# Patient Record
Sex: MALE | Race: WHITE | NOT HISPANIC OR LATINO | Employment: OTHER | URBAN - METROPOLITAN AREA
[De-identification: names, ages, dates, MRNs, and addresses within clinical notes are randomized per-mention and may not be internally consistent; named-entity substitution may affect disease eponyms.]

---

## 2017-01-11 ENCOUNTER — TELEPHONE (OUTPATIENT)
Dept: UROLOGY | Facility: CLINIC | Age: 82
End: 2017-01-11

## 2017-01-11 DIAGNOSIS — C61 PROSTATE CANCER: Primary | ICD-10-CM

## 2017-01-11 NOTE — TELEPHONE ENCOUNTER
----- Message from Lani Vega MA sent at 1/11/2017 10:35 AM CST -----  I scheduled lab at [facility] on 2-3-17 but the order needs to be extended and linked.  Please extend and i can link.  Thank you.

## 2017-02-03 ENCOUNTER — LAB VISIT (OUTPATIENT)
Dept: LAB | Facility: HOSPITAL | Age: 82
End: 2017-02-03
Attending: UROLOGY
Payer: MEDICARE

## 2017-02-03 DIAGNOSIS — C61 PROSTATE CANCER: ICD-10-CM

## 2017-02-03 LAB — COMPLEXED PSA SERPL-MCNC: 0.38 NG/ML

## 2017-02-03 PROCEDURE — 36415 COLL VENOUS BLD VENIPUNCTURE: CPT

## 2017-02-03 PROCEDURE — 84153 ASSAY OF PSA TOTAL: CPT

## 2017-02-10 ENCOUNTER — OFFICE VISIT (OUTPATIENT)
Dept: UROLOGY | Facility: CLINIC | Age: 82
End: 2017-02-10
Payer: MEDICARE

## 2017-02-10 VITALS
DIASTOLIC BLOOD PRESSURE: 75 MMHG | HEART RATE: 58 BPM | WEIGHT: 186 LBS | SYSTOLIC BLOOD PRESSURE: 181 MMHG | HEIGHT: 69 IN | BODY MASS INDEX: 27.55 KG/M2

## 2017-02-10 DIAGNOSIS — C61 PROSTATE CANCER: Primary | ICD-10-CM

## 2017-02-10 PROCEDURE — 99999 PR PBB SHADOW E&M-EST. PATIENT-LVL III: CPT | Mod: PBBFAC,,, | Performed by: UROLOGY

## 2017-02-10 PROCEDURE — 99213 OFFICE O/P EST LOW 20 MIN: CPT | Mod: S$GLB,,, | Performed by: UROLOGY

## 2017-02-10 PROCEDURE — 51798 US URINE CAPACITY MEASURE: CPT | Mod: S$GLB,,, | Performed by: UROLOGY

## 2017-02-10 PROCEDURE — 1160F RVW MEDS BY RX/DR IN RCRD: CPT | Mod: S$GLB,,, | Performed by: UROLOGY

## 2017-02-10 PROCEDURE — 1157F ADVNC CARE PLAN IN RCRD: CPT | Mod: S$GLB,,, | Performed by: UROLOGY

## 2017-02-10 PROCEDURE — 1159F MED LIST DOCD IN RCRD: CPT | Mod: S$GLB,,, | Performed by: UROLOGY

## 2017-02-10 PROCEDURE — 1126F AMNT PAIN NOTED NONE PRSNT: CPT | Mod: S$GLB,,, | Performed by: UROLOGY

## 2017-02-10 NOTE — MR AVS SNAPSHOT
[facility]  [address]  [facility] [address]  Phone: [phone]                  Aleksandr Schultz   2/10/2017 11:15 AM   Office Visit    Description:  Male : 1926   Provider:  Quirino Tran MD   Department:  [facility]           Reason for Visit     Prostate Cancer     Benign Prostatic Hypertrophy           Diagnoses this Visit        Comments    Prostate cancer    -  Primary            To Do List           Future Appointments        Provider Department Dept Phone    3/29/2017 10:30 AM Merritt Short MD [facility] - Ophthalmology [phone]    4/3/2017 9:30 AM LAB, [facility] [phone]    4/10/2017 1:30 PM Lynne Buckner MD [place] - Cardiology [phone]      Goals (5 Years of Data)     None      Follow-Up and Disposition     Return in about 1 year (around 2/10/2018).      [facility]     [facility] Line - /7 Assistance  Registered nurses in the [facility] provide clinical advisement, health education, appointment booking, and other advisory services.  Call for this free service at [phone].             Medications           Message regarding Medications     Verify the changes and/or additions to your medication regime listed below are the same as discussed with your clinician today.  If any of these changes or additions are incorrect, please notify your healthcare provider.             Verify that the below list of medications is an accurate representation of the medications you are currently taking.  If none reported, the list may be blank. If incorrect, please contact your healthcare provider. Carry this list with you in case of emergency.           Current Medications     albuterol (PROVENTIL) 2.5 mg /3 mL (0.083 %) nebulizer solution Take 2.5 mg by nebulization 4 (four) times daily as needed.     amlodipine (NORVASC) 10 MG tablet TAKE 1 TABLET ONE TIME  "DAILY    aspirin (ECOTRIN) 81 MG EC tablet Take 81 mg by mouth once daily.      atorvastatin (LIPITOR) 40 MG tablet Take 40 mg by mouth once daily.    CALCIUM CARBONATE/VITAMIN D3 (VITAMIN D-3 ORAL) Take 1,000 mg by mouth once daily.    cilostazol (PLETAL) 100 MG Tab Take 50 mg by mouth 2 (two) times daily.      cinnamon bark (CINNAMON) 500 mg capsule Take 500 mg by mouth once daily.    cyclobenzaprine (FLEXERIL) 10 MG tablet Take 10 mg by mouth 3 (three) times daily as needed.      fish oil-omega-3 fatty acids 300-1,000 mg capsule Take 2 g by mouth once daily.      FLAXSEED OIL ORAL Take 1 tablet by mouth once daily.     folic acid (FOLVITE) 400 MCG tablet Take 400 mcg by mouth once daily. Patient taking 800mg Daily    furosemide (LASIX) 40 MG tablet TAKE 1 TABLET ONE TIME DAILY    garlic 1,000 mg Cap Take 1 tablet by mouth 2 (two) times daily.     glimepiride (AMARYL) 4 MG tablet Take 4 mg by mouth daily with breakfast.     ipratropium (ATROVENT) 0.02 % nebulizer solution Take 500 mcg by nebulization 4 (four) times daily. PRN    labetalol (NORMODYNE) 200 MG tablet TAKE 2 TABLETS TWICE DAILY    lisinopril (PRINIVIL,ZESTRIL) 2.5 MG tablet TAKE 1 TABLET EVERY DAY    lisinopril (PRINIVIL,ZESTRIL) 5 MG tablet Take 1 tablet (5 mg total) by mouth once daily.    loratadine 10 mg Cap Take 1 tablet by mouth once daily.     mometasone (NASONEX) 50 mcg/actuation nasal spray 2 sprays by Nasal route daily as needed.     nitroGLYCERIN (NITROSTAT) 0.4 MG SL tablet Place 0.4 mg under the tongue every 5 (five) minutes as needed (PRN).     ranitidine (ZANTAC) 150 MG tablet Take 150 mg by mouth 2 (two) times daily.      tamsulosin (FLOMAX) 0.4 mg Cp24 Take 0.4 mg by mouth once daily.           Clinical Reference Information           Your Vitals Were     BP Pulse Height Weight BMI    181/75 58 5' 9" (1.753 m) 84.4 kg (186 lb) 27.47 kg/m2      Blood Pressure          Most Recent Value    BP  (!)  181/75      Allergies as of 2/10/2017  "    Iodinated Contrast Media - Iv Dye      Immunizations Administered on Date of Encounter - 2/10/2017     None      Orders Placed During Today's Visit      Normal Orders This Visit    Bladder scan     Future Labs/Procedures Expected by Expires    Prostate Specific Antigen, Diagnostic  2/10/2018 2/10/2018      Language Assistance Services     ATTENTION: Language assistance services are available, free of charge. Please call [phone].      ATENCIÓN: Si habla español, tiene a elder disposición servicios gratuitos de asistencia lingüística. Llame al [phone].     CHÚ Ý: N?u b?n nói Ti?ng Vi?t, có các d?ch v? h? tr? ngôn ng? mi?n phí dành cho b?n. G?i s? [phone].         [facility] complies with applicable Federal civil rights laws and does not discriminate on the basis of race, color, national origin, age, disability, or sex.

## 2017-02-10 NOTE — PROGRESS NOTES
Subjective:       Patient ID: Aleksandr Schultz is a 89 y.o. male.    Chief Complaint: prostate cancer    Benign Prostatic Hypertrophy        Mr. Schultz is an 89 yo male with a h/o [place] 3+3 prostate cancer.  He underwent XRT in 2005.  He is here for f/u.  He is continent.  ED is not bothersome.    PSA on 2/3/17 was 0.38.  He has urinary frequency and urgency.  He was started on ditropan XL. This did not help.  No dysuria.  Nocturia 1x/night. No hematuria.  Urine dipstick show +glucose.    Lab Results   Component Value Date    PSA 0.65 12/04/2012    PSA 0.59 11/22/2011    PSA 0.63 12/17/2010    PSA 0.82 11/30/2009    PSA 0.86 12/11/2008    PSA 0.9 07/01/2008    PSA 0.9 12/10/2007    PSA 1.7 05/03/2007    PSA 0.8 04/18/2007    PSA 1.4 11/13/2006    PSA 1.7 05/22/2006    PSA 1.8 11/15/2005    PSA 1.8 07/19/2005    PSA 2.1 04/15/2005    PSA 7.3 (H) 11/12/2004    PSADIAG 0.38 02/03/2017    PSADIAG 0.58 01/08/2016    PSADIAG 0.55 07/07/2015    PSADIAG 1.5 01/06/2015    PSADIAG 0.76 01/07/2014     Has a h/o HTN, MI, and CVA.  Stroke was 1991 and MI 1982.  He is s/p Carotid endarterectomy.  He is s/p PCI for his CAD.  Takes 81 mg aspirin.  No chest pain. Has never taken NTG for chest pain.       Review of Systems    All other systems reviewed and negative except pertinent positives noted in HPI.    Objective:      Physical Exam   Constitutional: He is oriented to person, place, and time. He appears well-developed and well-nourished. No distress.   HENT:   Head: Normocephalic and atraumatic.   Eyes: No scleral icterus.   Neck: No tracheal deviation present.   Pulmonary/Chest: Effort normal. No respiratory distress.   Neurological: He is alert and oriented to person, place, and time.   Psychiatric: He has a normal mood and affect. His behavior is normal. Judgment and thought content normal.       Assessment:       1. Prostate cancer        Plan:       -A post void residual bladder scan was performed immediately after voiding.  The patient's PVR was noted to be 18mL.  -PSA reviewed.  Recheck in 1 year.  -LUTs are not terribly bothersome to him.  He has tried anticholinergics in the past.  We'll hold on any intervention at this time.  -f/u in 1 year.  -I spent 15 minutes with the patient of which more than half was spent in direct consultation with the patient in regards to our treatment and plan.

## 2017-03-17 RX ORDER — AMLODIPINE BESYLATE 10 MG/1
TABLET ORAL
Qty: 90 TABLET | Refills: 3 | Status: SHIPPED | OUTPATIENT
Start: 2017-03-17 | End: 2017-12-10 | Stop reason: SDUPTHER

## 2017-03-17 RX ORDER — FUROSEMIDE 40 MG/1
TABLET ORAL
Qty: 90 TABLET | Refills: 3 | Status: SHIPPED | OUTPATIENT
Start: 2017-03-17 | End: 2017-11-29 | Stop reason: SDUPTHER

## 2017-03-29 ENCOUNTER — OFFICE VISIT (OUTPATIENT)
Dept: OPHTHALMOLOGY | Facility: CLINIC | Age: 82
End: 2017-03-29
Payer: MEDICARE

## 2017-03-29 DIAGNOSIS — H04.123 BILATERAL DRY EYES: ICD-10-CM

## 2017-03-29 DIAGNOSIS — Z98.41 STATUS POST CATARACT EXTRACTION AND INSERTION OF INTRAOCULAR LENS, RIGHT: ICD-10-CM

## 2017-03-29 DIAGNOSIS — H25.12 NUCLEAR SCLEROSIS, LEFT: Primary | ICD-10-CM

## 2017-03-29 DIAGNOSIS — Z96.1 STATUS POST CATARACT EXTRACTION AND INSERTION OF INTRAOCULAR LENS, RIGHT: ICD-10-CM

## 2017-03-29 PROCEDURE — 99999 PR PBB SHADOW E&M-EST. PATIENT-LVL II: CPT | Mod: PBBFAC,,, | Performed by: OPHTHALMOLOGY

## 2017-03-29 PROCEDURE — 92014 COMPRE OPH EXAM EST PT 1/>: CPT | Mod: S$GLB,,, | Performed by: OPHTHALMOLOGY

## 2017-03-29 NOTE — MR AVS SNAPSHOT
[facility]  [address]  [facility] [address]  Phone: [phone]  Fax: [phone]                  Aleksandr Schultz   3/29/2017 1:30 PM   Office Visit    Description:  Male : 1926   Provider:  Merritt Short MD   Department:  [facility]           Reason for Visit     Diabetic Eye Exam           Diagnoses this Visit        Comments    Nuclear sclerosis, left    -  Primary     Status post cataract extraction and insertion of intraocular lens, right         Bilateral dry eyes         No diabetic retinopathy in both eyes                To Do List           Future Appointments        Provider Department Dept Phone    4/3/2017 9:30 AM LAB, [facility] [phone]    4/10/2017 1:30 PM Lynne Buckner MD [place] - Cardiology [phone]      Goals (5 Years of Data)     None      Follow-Up and Disposition     Return in about 1 year (around 3/29/2018), or if symptoms worsen or fail to improve, for Pressure and Dilate.      [facility]     [facility] Care Line -  Assistance  Unless otherwise directed by your provider, please contact [facility], our nurse care line that is available for  assistance.     Registered nurses in the [facility] provide: appointment scheduling, clinical advisement, health education, and other advisory services.  Call: [phone] (toll free)               Medications           Message regarding Medications     Verify the changes and/or additions to your medication regime listed below are the same as discussed with your clinician today.  If any of these changes or additions are incorrect, please notify your healthcare provider.             Verify that the below list of medications is an accurate representation of the medications you are currently taking.  If none reported, the list may be blank. If incorrect, please contact your healthcare provider. Carry  this list with you in case of emergency.           Current Medications     albuterol (PROVENTIL) 2.5 mg /3 mL (0.083 %) nebulizer solution Take 2.5 mg by nebulization 4 (four) times daily as needed.     amlodipine (NORVASC) 10 MG tablet TAKE 1 TABLET ONE TIME DAILY    aspirin (ECOTRIN) 81 MG EC tablet Take 81 mg by mouth once daily.      atorvastatin (LIPITOR) 40 MG tablet Take 40 mg by mouth once daily.    CALCIUM CARBONATE/VITAMIN D3 (VITAMIN D-3 ORAL) Take 1,000 mg by mouth once daily.    cilostazol (PLETAL) 100 MG Tab Take 50 mg by mouth 2 (two) times daily.      cinnamon bark (CINNAMON) 500 mg capsule Take 500 mg by mouth once daily.    cyclobenzaprine (FLEXERIL) 10 MG tablet Take 10 mg by mouth 3 (three) times daily as needed.      fish oil-omega-3 fatty acids 300-1,000 mg capsule Take 2 g by mouth once daily.      FLAXSEED OIL ORAL Take 1 tablet by mouth once daily.     folic acid (FOLVITE) 400 MCG tablet Take 400 mcg by mouth once daily. Patient taking 800mg Daily    furosemide (LASIX) 40 MG tablet TAKE 1 TABLET ONE TIME DAILY    garlic 1,000 mg Cap Take 1 tablet by mouth 2 (two) times daily.     glimepiride (AMARYL) 4 MG tablet Take 4 mg by mouth daily with breakfast.     ipratropium (ATROVENT) 0.02 % nebulizer solution Take 500 mcg by nebulization 4 (four) times daily. PRN    labetalol (NORMODYNE) 200 MG tablet TAKE 2 TABLETS TWICE DAILY    lisinopril (PRINIVIL,ZESTRIL) 2.5 MG tablet TAKE 1 TABLET EVERY DAY    lisinopril (PRINIVIL,ZESTRIL) 5 MG tablet Take 1 tablet (5 mg total) by mouth once daily.    loratadine 10 mg Cap Take 1 tablet by mouth once daily.     mometasone (NASONEX) 50 mcg/actuation nasal spray 2 sprays by Nasal route daily as needed.     nitroGLYCERIN (NITROSTAT) 0.4 MG SL tablet Place 0.4 mg under the tongue every 5 (five) minutes as needed (PRN).     ranitidine (ZANTAC) 150 MG tablet Take 150 mg by mouth 2 (two) times daily.      tamsulosin (FLOMAX) 0.4 mg Cp24 Take 0.4 mg by mouth once  daily.           Clinical Reference Information           Allergies as of 3/29/2017     Iodinated Contrast Media - Iv Dye      Immunizations Administered on Date of Encounter - 3/29/2017     None      Language Assistance Services     ATTENTION: Language assistance services are available, free of charge. Please call [phone].      ATENCIÓN: Si paul kenney, tiene a elder disposición servicios gratuitos de asistencia lingüística. Llame al [phone].     CHÚ Ý: N?u b?n nói Ti?ng Vi?t, có các d?ch v? h? tr? ngôn ng? mi?n phí dành cho b?n. G?i s? [phone].         [facility] complies with applicable Federal civil rights laws and does not discriminate on the basis of race, color, national origin, age, disability, or sex.

## 2017-03-30 NOTE — PROGRESS NOTES
HPI     DLS: 03/14/2016  IOP/DFE  Type 2 diabetes  PC IOL OD  NSC OS        Last edited by Lizzie Flores on 3/29/2017  1:33 PM.         Assessment /Plan     For exam results, see Encounter Report.    Nuclear sclerosis, left    Status post cataract extraction and insertion of intraocular lens, right    Bilateral dry eyes - Both Eyes    No diabetic retinopathy in both eyes - Both Eyes          Patient with Daughter    Wife 87 yo  Bedridden x 8 years with AD  Caretaker  Daughter lives next door        NSC OS - CE PRN as discussed  + FLOMAX use with fair - poor dilation  Patient happy with Va QOL    PC IOL OD  stable    NIDDM  no BDR or csme  Control    Dry Eye Syndrome: discussed use of warm compresses, preserved & non-preserved artificial tears, gel and PM ointment options.  Also discussed options utilizing medications.         Plan  RTC 1 year for cataract and BDR check, IOP & DFE  RTC Sooner prn with good understanding

## 2017-04-03 ENCOUNTER — PATIENT MESSAGE (OUTPATIENT)
Dept: CARDIOLOGY | Facility: CLINIC | Age: 82
End: 2017-04-03

## 2017-04-03 ENCOUNTER — TELEPHONE (OUTPATIENT)
Dept: CARDIOLOGY | Facility: CLINIC | Age: 82
End: 2017-04-03

## 2017-04-03 DIAGNOSIS — I10 HYPERTENSION, ESSENTIAL: ICD-10-CM

## 2017-04-03 DIAGNOSIS — E78.5 DYSLIPIDEMIA: ICD-10-CM

## 2017-04-03 NOTE — TELEPHONE ENCOUNTER
Pt's daughter notified that lab orders are linked to pt's appointment. José Miguel verbalized understanding.

## 2017-04-05 ENCOUNTER — LAB VISIT (OUTPATIENT)
Dept: LAB | Facility: HOSPITAL | Age: 82
End: 2017-04-05
Attending: INTERNAL MEDICINE
Payer: MEDICARE

## 2017-04-05 ENCOUNTER — TELEPHONE (OUTPATIENT)
Dept: CARDIOLOGY | Facility: CLINIC | Age: 82
End: 2017-04-05

## 2017-04-05 DIAGNOSIS — I10 HYPERTENSION, ESSENTIAL: ICD-10-CM

## 2017-04-05 DIAGNOSIS — E78.5 DYSLIPIDEMIA: ICD-10-CM

## 2017-04-05 LAB
ALBUMIN SERPL BCP-MCNC: 3.5 G/DL
ALP SERPL-CCNC: 78 U/L
ALT SERPL W/O P-5'-P-CCNC: 25 U/L
ANION GAP SERPL CALC-SCNC: 9 MMOL/L
AST SERPL-CCNC: 20 U/L
BILIRUB SERPL-MCNC: 0.6 MG/DL
BUN SERPL-MCNC: 21 MG/DL
CALCIUM SERPL-MCNC: 9 MG/DL
CHLORIDE SERPL-SCNC: 107 MMOL/L
CHOLEST/HDLC SERPL: 3.3 {RATIO}
CO2 SERPL-SCNC: 28 MMOL/L
CREAT SERPL-MCNC: 1 MG/DL
EST. GFR  (AFRICAN AMERICAN): >60 ML/MIN/1.73 M^2
EST. GFR  (NON AFRICAN AMERICAN): >60 ML/MIN/1.73 M^2
GLUCOSE SERPL-MCNC: 99 MG/DL
HDL/CHOLESTEROL RATIO: 29.9 %
HDLC SERPL-MCNC: 144 MG/DL
HDLC SERPL-MCNC: 43 MG/DL
LDLC SERPL CALC-MCNC: 87.8 MG/DL
NONHDLC SERPL-MCNC: 101 MG/DL
POTASSIUM SERPL-SCNC: 3.6 MMOL/L
PROT SERPL-MCNC: 6.7 G/DL
SODIUM SERPL-SCNC: 144 MMOL/L
TRIGL SERPL-MCNC: 66 MG/DL
TSH SERPL DL<=0.005 MIU/L-ACNC: 2.94 UIU/ML

## 2017-04-05 PROCEDURE — 36415 COLL VENOUS BLD VENIPUNCTURE: CPT

## 2017-04-05 PROCEDURE — 84443 ASSAY THYROID STIM HORMONE: CPT

## 2017-04-05 PROCEDURE — 83036 HEMOGLOBIN GLYCOSYLATED A1C: CPT

## 2017-04-05 PROCEDURE — 80053 COMPREHEN METABOLIC PANEL: CPT

## 2017-04-05 PROCEDURE — 80061 LIPID PANEL: CPT

## 2017-04-05 NOTE — TELEPHONE ENCOUNTER
----- Message from Lynne Buckner MD sent at 4/5/2017 10:42 AM CDT -----  Please review.  We will discuss the results during your upcoming visit with me. It looks good.

## 2017-04-06 LAB
ESTIMATED AVG GLUCOSE: 154 MG/DL
HBA1C MFR BLD HPLC: 7 %

## 2017-04-10 ENCOUNTER — OFFICE VISIT (OUTPATIENT)
Dept: CARDIOLOGY | Facility: CLINIC | Age: 82
End: 2017-04-10
Payer: MEDICARE

## 2017-04-10 VITALS
BODY MASS INDEX: 27.13 KG/M2 | HEIGHT: 69 IN | HEART RATE: 59 BPM | SYSTOLIC BLOOD PRESSURE: 177 MMHG | DIASTOLIC BLOOD PRESSURE: 77 MMHG | WEIGHT: 183.19 LBS

## 2017-04-10 DIAGNOSIS — I77.9 BILATERAL CAROTID ARTERY DISEASE: Chronic | ICD-10-CM

## 2017-04-10 DIAGNOSIS — I71.40 ABDOMINAL AORTIC ANEURYSM (AAA) WITHOUT RUPTURE: Chronic | ICD-10-CM

## 2017-04-10 DIAGNOSIS — I67.2 CEREBRAL ATHEROSCLEROSIS: ICD-10-CM

## 2017-04-10 DIAGNOSIS — E78.5 DYSLIPIDEMIA: Chronic | ICD-10-CM

## 2017-04-10 DIAGNOSIS — I10 ESSENTIAL HYPERTENSION: Primary | ICD-10-CM

## 2017-04-10 DIAGNOSIS — I35.9 AORTIC VALVE DISORDER: Chronic | ICD-10-CM

## 2017-04-10 DIAGNOSIS — I73.9 PAD (PERIPHERAL ARTERY DISEASE): ICD-10-CM

## 2017-04-10 DIAGNOSIS — Z85.46 HISTORY OF PROSTATE CANCER: ICD-10-CM

## 2017-04-10 DIAGNOSIS — N25.81 SECONDARY HYPERPARATHYROIDISM: ICD-10-CM

## 2017-04-10 PROCEDURE — 1125F AMNT PAIN NOTED PAIN PRSNT: CPT | Mod: S$GLB,,, | Performed by: INTERNAL MEDICINE

## 2017-04-10 PROCEDURE — 1160F RVW MEDS BY RX/DR IN RCRD: CPT | Mod: S$GLB,,, | Performed by: INTERNAL MEDICINE

## 2017-04-10 PROCEDURE — 1159F MED LIST DOCD IN RCRD: CPT | Mod: S$GLB,,, | Performed by: INTERNAL MEDICINE

## 2017-04-10 PROCEDURE — 99999 PR PBB SHADOW E&M-EST. PATIENT-LVL III: CPT | Mod: PBBFAC,,, | Performed by: INTERNAL MEDICINE

## 2017-04-10 PROCEDURE — 1157F ADVNC CARE PLAN IN RCRD: CPT | Mod: S$GLB,,, | Performed by: INTERNAL MEDICINE

## 2017-04-10 PROCEDURE — 99214 OFFICE O/P EST MOD 30 MIN: CPT | Mod: S$GLB,,, | Performed by: INTERNAL MEDICINE

## 2017-04-10 RX ORDER — FOLIC ACID 1 MG/1
1 TABLET ORAL DAILY
COMMUNITY
End: 2019-02-25

## 2017-04-10 RX ORDER — METFORMIN HYDROCHLORIDE 500 MG/1
500 TABLET ORAL
COMMUNITY
End: 2018-08-14 | Stop reason: ALTCHOICE

## 2017-04-10 RX ORDER — CHOLECALCIFEROL (VITAMIN D3) 25 MCG
1000 TABLET ORAL DAILY
COMMUNITY

## 2017-04-10 NOTE — PROGRESS NOTES
Subjective:   Patient ID:  Aleksandr Schultz is a 89 y.o. male who presents for follow-up of Hypertension      HPI: Patient is here for follow-up of elevated blood pressure. He tries to adhere to law salt diet. Patient denies chest pain, dyspnea, and palpitations.  Peiperal edema is controlled on diuretics and support stockings.  BP is elevated today.        Lab Results   Component Value Date     04/05/2017    K 3.6 04/05/2017     04/05/2017    CO2 28 04/05/2017    BUN 21 04/05/2017    CREATININE 1.0 04/05/2017    GLU 99 04/05/2017    HGBA1C 7.0 (H) 04/05/2017    MG 2.4 07/07/2011    AST 20 04/05/2017    ALT 25 04/05/2017    ALBUMIN 3.5 04/05/2017    PROT 6.7 04/05/2017    BILITOT 0.6 04/05/2017    WBC 4.42 03/13/2014    HGB 13.3 (L) 03/13/2014    HCT 39.8 (L) 03/13/2014    MCV 94 03/13/2014     03/13/2014    PSA 0.65 12/04/2012    TSH 2.942 04/05/2017    CHOL 144 04/05/2017    HDL 43 04/05/2017    LDLCALC 87.8 04/05/2017    TRIG 66 04/05/2017       Review of Systems   Constitution: Positive for malaise/fatigue.   HENT: Negative.    Eyes: Negative.    Cardiovascular: Positive for dyspnea on exertion and leg swelling. Negative for chest pain, claudication, irregular heartbeat, near-syncope, palpitations and syncope.   Respiratory: Positive for cough and wheezing. Negative for shortness of breath and snoring.    Endocrine: Negative.  Negative for cold intolerance, heat intolerance, polydipsia, polyphagia and polyuria.   Skin: Negative.    Musculoskeletal: Positive for arthritis and back pain.   Gastrointestinal: Negative.    Genitourinary: Negative.    Neurological: Positive for loss of balance.   Psychiatric/Behavioral: Negative.        Current Outpatient Prescriptions:     albuterol (PROVENTIL) 2.5 mg /3 mL (0.083 %) nebulizer solution, Take 2.5 mg by nebulization 4 (four) times daily as needed. , Disp: , Rfl:     amlodipine (NORVASC) 10 MG tablet, TAKE 1 TABLET ONE TIME DAILY, Disp: 90 tablet,  Rfl: 3    aspirin (ECOTRIN) 81 MG EC tablet, Take 81 mg by mouth once daily.  , Disp: , Rfl:     atorvastatin (LIPITOR) 40 MG tablet, Take 40 mg by mouth once daily., Disp: , Rfl:     cilostazol (PLETAL) 100 MG Tab, Take 100 mg by mouth once daily. , Disp: , Rfl:     cinnamon bark (CINNAMON) 500 mg capsule, Take 500 mg by mouth once daily., Disp: , Rfl:     cyclobenzaprine (FLEXERIL) 10 MG tablet, Take 10 mg by mouth 3 (three) times daily as needed.  , Disp: , Rfl:     fish oil-omega-3 fatty acids 300-1,000 mg capsule, Take 2 g by mouth once daily.  , Disp: , Rfl:     FLAXSEED OIL ORAL, Take 1 tablet by mouth once daily. , Disp: , Rfl:     folic acid (FOLVITE) 1 MG tablet, Take 1 mg by mouth once daily., Disp: , Rfl:     furosemide (LASIX) 40 MG tablet, TAKE 1 TABLET ONE TIME DAILY, Disp: 90 tablet, Rfl: 3    garlic 1,000 mg Cap, Take 1 tablet by mouth 2 (two) times daily. , Disp: , Rfl:     glimepiride (AMARYL) 4 MG tablet, Take 4 mg by mouth daily with breakfast. Pt taking one pill twice a day., Disp: , Rfl:     ipratropium (ATROVENT) 0.02 % nebulizer solution, Take 500 mcg by nebulization 4 (four) times daily. PRN, Disp: , Rfl:     labetalol (NORMODYNE) 200 MG tablet, TAKE 2 TABLETS TWICE DAILY, Disp: 360 tablet, Rfl: 3    lisinopril (PRINIVIL,ZESTRIL) 2.5 MG tablet, TAKE 1 TABLET EVERY DAY, Disp: 90 tablet, Rfl: 3    loratadine 10 mg Cap, Take 1 tablet by mouth once daily. , Disp: , Rfl:     metformin (GLUCOPHAGE) 500 MG tablet, Take 500 mg by mouth daily with breakfast., Disp: , Rfl:     mometasone (NASONEX) 50 mcg/actuation nasal spray, 2 sprays by Nasal route daily as needed. , Disp: , Rfl:     nitroGLYCERIN (NITROSTAT) 0.4 MG SL tablet, Place 0.4 mg under the tongue every 5 (five) minutes as needed (PRN). , Disp: , Rfl:     ranitidine (ZANTAC) 150 MG tablet, Take 150 mg by mouth 2 (two) times daily.  , Disp: , Rfl:     tamsulosin (FLOMAX) 0.4 mg Cp24, Take 0.4 mg by mouth once daily.,  "Disp: , Rfl:     vitamin D 1000 units Tab, Take 1,000 Units by mouth once daily., Disp: , Rfl:   Objective:   Physical Exam   Constitutional: He is oriented to person, place, and time. He appears well-developed and well-nourished.   BP (!) 177/77  Pulse (!) 59  Ht 5' 9" (1.753 m)  Wt 83.1 kg (183 lb 3.2 oz)  BMI 27.05 kg/m2     HENT:   Head: Normocephalic.   Eyes: Pupils are equal, round, and reactive to light.   Neck: Normal range of motion. Neck supple. No thyromegaly present.   Cardiovascular: Normal rate, regular rhythm and intact distal pulses.  Exam reveals no gallop and no friction rub.    Murmur heard.   Midsystolic murmur is present with a grade of 2/6  at the upper right sternal border radiating to the neck  Pulses:       Carotid pulses are 2+ on the right side with bruit, and 2+ on the left side with bruit.       Radial pulses are 2+ on the right side, and 2+ on the left side.        Femoral pulses are 2+ on the right side, and 2+ on the left side.       Popliteal pulses are 2+ on the right side, and 2+ on the left side.        Dorsalis pedis pulses are 2+ on the right side, and 2+ on the left side.        Posterior tibial pulses are 2+ on the right side, and 2+ on the left side.   Pulmonary/Chest: Effort normal and breath sounds normal. No respiratory distress. He has no wheezes. He has no rales. He exhibits no tenderness.   Abdominal: Soft. Bowel sounds are normal.   Musculoskeletal: Normal range of motion. He exhibits edema.   1+ bilateral edema   Neurological: He is alert and oriented to person, place, and time.   Skin: Skin is warm and dry.   Psychiatric: He has a normal mood and affect.   Nursing note and vitals reviewed.      Assessment and Plan:     Problem List Items Addressed This Visit        Cardiology Problems    AAA (abdominal aortic aneurysm) (Chronic)    Bilateral carotid artery disease (Chronic)    Relevant Orders    2D echo with color flow doppler    CAR Ultrasound doppler carotid " bliateral    Basic metabolic panel    Aortic valve disorder (Chronic)    Relevant Orders    2D echo with color flow doppler    CAR Ultrasound doppler carotid bliateral    Basic metabolic panel    Hypertension - Primary    Relevant Orders    2D echo with color flow doppler    CAR Ultrasound doppler carotid bliateral    Basic metabolic panel    PAD (peripheral artery disease)       Other    Dyslipidemia (Chronic)    Type 2 diabetes, uncontrolled, with neuropathy (Chronic)    Secondary hyperparathyroidism    History of prostate cancer      Other Visit Diagnoses     Cerebral atherosclerosis         Relevant Orders    CAR Ultrasound doppler carotid bliateral      Advised to double on Lisinopril. Patient is not sure what is his present dose. Will call back   Review tests results and advise.  Keep BP log and call if out of range.  Return in about 6 months (around 10/10/2017).

## 2017-04-10 NOTE — MR AVS SNAPSHOT
[place] [facility]   [facility]  [place] [address]  Phone: [phone]                  Aleksandr Schultz   4/10/2017 1:30 PM   Office Visit    Description:  Male : 1926   Provider:  Lynne Buckner MD   Department:  [place] [facility]           Reason for Visit     Hypertension                To Do List           Goals (5 Years of Data)     None      [facility]     [facility] Nurse Care Line -  Assistance  Unless otherwise directed by your provider, please contact [facility], our nurse care line that is available for  assistance.     Registered nurses in the [facility] provide: appointment scheduling, clinical advisement, health education, and other advisory services.  Call: [phone] (toll free)               Medications           Message regarding Medications     Verify the changes and/or additions to your medication regime listed below are the same as discussed with your clinician today.  If any of these changes or additions are incorrect, please notify your healthcare provider.        STOP taking these medications     CALCIUM CARBONATE/VITAMIN D3 (VITAMIN D-3 ORAL) Take 1,000 mg by mouth once daily.           Verify that the below list of medications is an accurate representation of the medications you are currently taking.  If none reported, the list may be blank. If incorrect, please contact your healthcare provider. Carry this list with you in case of emergency.           Current Medications     albuterol (PROVENTIL) 2.5 mg /3 mL (0.083 %) nebulizer solution Take 2.5 mg by nebulization 4 (four) times daily as needed.     amlodipine (NORVASC) 10 MG tablet TAKE 1 TABLET ONE TIME DAILY    aspirin (ECOTRIN) 81 MG EC tablet Take 81 mg by mouth once daily.      atorvastatin (LIPITOR) 40 MG tablet Take 40 mg by mouth once daily.    cilostazol (PLETAL) 100 MG Tab Take 100 mg by mouth once daily.     cinnamon bark (CINNAMON) 500 mg  "capsule Take 500 mg by mouth once daily.    cyclobenzaprine (FLEXERIL) 10 MG tablet Take 10 mg by mouth 3 (three) times daily as needed.      fish oil-omega-3 fatty acids 300-1,000 mg capsule Take 2 g by mouth once daily.      FLAXSEED OIL ORAL Take 1 tablet by mouth once daily.     folic acid (FOLVITE) 1 MG tablet Take 1 mg by mouth once daily.    furosemide (LASIX) 40 MG tablet TAKE 1 TABLET ONE TIME DAILY    garlic 1,000 mg Cap Take 1 tablet by mouth 2 (two) times daily.     glimepiride (AMARYL) 4 MG tablet Take 4 mg by mouth daily with breakfast. Pt taking one pill twice a day.    ipratropium (ATROVENT) 0.02 % nebulizer solution Take 500 mcg by nebulization 4 (four) times daily. PRN    labetalol (NORMODYNE) 200 MG tablet TAKE 2 TABLETS TWICE DAILY    lisinopril (PRINIVIL,ZESTRIL) 2.5 MG tablet TAKE 1 TABLET EVERY DAY    loratadine 10 mg Cap Take 1 tablet by mouth once daily.     metformin (GLUCOPHAGE) 500 MG tablet Take 500 mg by mouth daily with breakfast.    mometasone (NASONEX) 50 mcg/actuation nasal spray 2 sprays by Nasal route daily as needed.     nitroGLYCERIN (NITROSTAT) 0.4 MG SL tablet Place 0.4 mg under the tongue every 5 (five) minutes as needed (PRN).     ranitidine (ZANTAC) 150 MG tablet Take 150 mg by mouth 2 (two) times daily.      tamsulosin (FLOMAX) 0.4 mg Cp24 Take 0.4 mg by mouth once daily.    vitamin D 1000 units Tab Take 1,000 Units by mouth once daily.           Clinical Reference Information           Your Vitals Were     BP Pulse Height Weight BMI    177/77 59 5' 9" (1.753 m) 83.1 kg (183 lb 3.2 oz) 27.05 kg/m2      Blood Pressure          Most Recent Value    Right Arm BP - Sitting  167/70    Left Arm BP - Sitting  177/77    BP  (!)  177/77      Allergies as of 4/10/2017     Iodinated Contrast Media - Iv Dye      Immunizations Administered on Date of Encounter - 4/10/2017     None      Language Assistance Services     ATTENTION: Language assistance services are available, free of " charge. Please call [phone].      ATENCIÓN: Si habla español, tiene a elder disposición servicios gratuitos de asistencia lingüística. Llame al [phone].     CHÚ Ý: N?u b?n nói Ti?ng Vi?t, có các d?ch v? h? tr? ngôn ng? mi?n phí dành cho b?n. G?i s? [phone].         [facility] [facility] complies with applicable Federal civil rights laws and does not discriminate on the basis of race, color, national origin, age, disability, or sex.

## 2017-04-11 ENCOUNTER — TELEPHONE (OUTPATIENT)
Dept: CARDIOLOGY | Facility: CLINIC | Age: 82
End: 2017-04-11

## 2017-04-11 DIAGNOSIS — I10 HYPERTENSION, ESSENTIAL: Primary | ICD-10-CM

## 2017-04-11 RX ORDER — LISINOPRIL 10 MG/1
10 TABLET ORAL DAILY
Qty: 90 TABLET | Refills: 3 | Status: SHIPPED | OUTPATIENT
Start: 2017-04-11 | End: 2017-06-16 | Stop reason: DRUGHIGH

## 2017-04-11 RX ORDER — LISINOPRIL 10 MG/1
10 TABLET ORAL DAILY
COMMUNITY
End: 2017-04-11 | Stop reason: SDUPTHER

## 2017-04-11 NOTE — TELEPHONE ENCOUNTER
Pt's daughter stated that pt is taking 2, 2.5mg Lisinopril and has a bottle of 5mg Lisinopril at home. Pt had a lot of 2.5mg that he has not started on the 5mg bottle yet. Pt is taking a total of 5mg once a day.

## 2017-04-11 NOTE — TELEPHONE ENCOUNTER
----- Message from Angeles Joshi MA sent at 4/10/2017  2:05 PM CDT -----  Did pt's daughter call w/dosage of lisinopril? Whatever pt is taking double the dosage per Dr. Buckner.

## 2017-04-20 ENCOUNTER — CLINICAL SUPPORT (OUTPATIENT)
Dept: CARDIOLOGY | Facility: CLINIC | Age: 82
End: 2017-04-20
Payer: MEDICARE

## 2017-04-20 ENCOUNTER — LAB VISIT (OUTPATIENT)
Dept: LAB | Facility: HOSPITAL | Age: 82
End: 2017-04-20
Attending: INTERNAL MEDICINE
Payer: MEDICARE

## 2017-04-20 DIAGNOSIS — I77.9 BILATERAL CAROTID ARTERY DISEASE: Chronic | ICD-10-CM

## 2017-04-20 DIAGNOSIS — I67.2 CEREBRAL ATHEROSCLEROSIS: ICD-10-CM

## 2017-04-20 DIAGNOSIS — I35.9 AORTIC VALVE DISORDER: Chronic | ICD-10-CM

## 2017-04-20 DIAGNOSIS — I10 ESSENTIAL HYPERTENSION: ICD-10-CM

## 2017-04-20 LAB
ANION GAP SERPL CALC-SCNC: 8 MMOL/L
BUN SERPL-MCNC: 17 MG/DL
CALCIUM SERPL-MCNC: 8.6 MG/DL
CHLORIDE SERPL-SCNC: 105 MMOL/L
CO2 SERPL-SCNC: 28 MMOL/L
CREAT SERPL-MCNC: 1.1 MG/DL
EST. GFR  (AFRICAN AMERICAN): >60 ML/MIN/1.73 M^2
EST. GFR  (NON AFRICAN AMERICAN): 58.8 ML/MIN/1.73 M^2
GLUCOSE SERPL-MCNC: 146 MG/DL
INTERNAL CAROTID STENOSIS: ABNORMAL
POTASSIUM SERPL-SCNC: 3.2 MMOL/L
SODIUM SERPL-SCNC: 141 MMOL/L

## 2017-04-20 PROCEDURE — 93880 EXTRACRANIAL BILAT STUDY: CPT | Mod: S$GLB,,, | Performed by: INTERNAL MEDICINE

## 2017-04-20 PROCEDURE — 93306 TTE W/DOPPLER COMPLETE: CPT | Mod: S$GLB,,, | Performed by: INTERNAL MEDICINE

## 2017-04-21 ENCOUNTER — TELEPHONE (OUTPATIENT)
Dept: CARDIOLOGY | Facility: CLINIC | Age: 82
End: 2017-04-21

## 2017-04-21 LAB
AORTIC VALVE REGURGITATION: ABNORMAL
AORTIC VALVE STENOSIS: ABNORMAL
DIASTOLIC DYSFUNCTION: YES
ESTIMATED PA SYSTOLIC PRESSURE: 35.95
MITRAL VALVE MOBILITY: NORMAL
RETIRED EF AND QEF - SEE NOTES: 60 (ref 55–65)
TRICUSPID VALVE REGURGITATION: ABNORMAL

## 2017-04-21 NOTE — TELEPHONE ENCOUNTER
----- Message from Lynne Buckner MD sent at 4/21/2017  1:44 PM CDT -----  Please review.  We will discuss the results during your upcoming visit with me. 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 No changes since the last one

## 2017-04-26 ENCOUNTER — TELEPHONE (OUTPATIENT)
Dept: CARDIOLOGY | Facility: CLINIC | Age: 82
End: 2017-04-26

## 2017-04-26 DIAGNOSIS — E87.6 HYPOKALEMIA: Primary | ICD-10-CM

## 2017-04-26 RX ORDER — POTASSIUM CHLORIDE 750 MG/1
10 CAPSULE, EXTENDED RELEASE ORAL DAILY
Qty: 30 CAPSULE | Refills: 1 | Status: SHIPPED | OUTPATIENT
Start: 2017-04-26 | End: 2017-04-28 | Stop reason: SDUPTHER

## 2017-04-26 RX ORDER — POTASSIUM CHLORIDE 750 MG/1
10 CAPSULE, EXTENDED RELEASE ORAL DAILY
COMMUNITY
End: 2017-04-26 | Stop reason: SDUPTHER

## 2017-04-26 NOTE — TELEPHONE ENCOUNTER
----- Message from Lynne Buckner MD sent at 4/25/2017  6:07 PM CDT -----  Micro K 10Meq daily.  Check BMP in 2 weeks.  ----- Message -----     From: Angeles Joshi MA     Sent: 4/21/2017   1:56 PM       To: Lynne Buckner MD    Pt was last seen in office by you on 4/10/17 and due to come back in 10/2017. Do you want to prescribe potassium pills now and do you want to add anything about his lab results? Please advise.   Thanks,   Angeles Joshi  ----- Message -----     From: Lynne Buckner MD     Sent: 4/21/2017   1:49 PM       To: Dudley Steve    Please review.  We will discuss the results during your upcoming visit with me.Potassim i slow.  Please increase OJ and banana.  We may have to prescribe potassium pills for you when you come for a visit

## 2017-04-26 NOTE — TELEPHONE ENCOUNTER
----- Message from Lynne Buckner MD sent at 4/25/2017  6:10 PM CDT -----  Mr. Schultz, please review results of your heart echo,. 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 Overall heart function is good.  For now  No other studies are indicated.

## 2017-04-28 DIAGNOSIS — E87.6 HYPOKALEMIA: ICD-10-CM

## 2017-04-28 RX ORDER — POTASSIUM CHLORIDE 750 MG/1
10 CAPSULE, EXTENDED RELEASE ORAL DAILY
Qty: 90 CAPSULE | Refills: 3 | Status: SHIPPED | OUTPATIENT
Start: 2017-04-28 | End: 2017-11-29 | Stop reason: SDUPTHER

## 2017-04-30 ENCOUNTER — PATIENT MESSAGE (OUTPATIENT)
Dept: CARDIOLOGY | Facility: CLINIC | Age: 82
End: 2017-04-30

## 2017-05-01 ENCOUNTER — TELEPHONE (OUTPATIENT)
Dept: CARDIOLOGY | Facility: CLINIC | Age: 82
End: 2017-05-01

## 2017-05-01 ENCOUNTER — PATIENT MESSAGE (OUTPATIENT)
Dept: CARDIOLOGY | Facility: CLINIC | Age: 82
End: 2017-05-01

## 2017-05-01 DIAGNOSIS — R00.2 HEART PALPITATIONS: Primary | ICD-10-CM

## 2017-05-01 NOTE — TELEPHONE ENCOUNTER
----- Message from Amanda Cedeño sent at 5/1/2017  4:14 PM CDT -----  Contact: Daughter /José Miguel   Patient daughter is returning a phone call.  Who left a message for the patient: Priscilla   Comments: [phone]        Thanks

## 2017-05-01 NOTE — TELEPHONE ENCOUNTER
José Miguel notified, verbalized understanding. José Miguel stated that she will check w/pt and let us know if they think pt should have 24 hour monitor placed.

## 2017-05-01 NOTE — TELEPHONE ENCOUNTER
Per Dr. Buckner, pt is to go to the ER to be evaluated for his symptoms. José Miguel verbalized understanding.

## 2017-05-01 NOTE — TELEPHONE ENCOUNTER
José Miguel notified, verbalized understanding. José Miguel stated that she does not think pt will want to do EKG, but she will mention it to him and let us know if he wants to do it.

## 2017-05-02 ENCOUNTER — PATIENT MESSAGE (OUTPATIENT)
Dept: CARDIOLOGY | Facility: CLINIC | Age: 82
End: 2017-05-02

## 2017-05-02 ENCOUNTER — HOSPITAL ENCOUNTER (OUTPATIENT)
Facility: HOSPITAL | Age: 82
Discharge: HOME | End: 2017-05-04
Attending: SURGERY | Admitting: INTERNAL MEDICINE
Payer: MEDICARE

## 2017-05-02 ENCOUNTER — TELEPHONE (OUTPATIENT)
Dept: CARDIOLOGY | Facility: CLINIC | Age: 82
End: 2017-05-02

## 2017-05-02 DIAGNOSIS — I48.91 A-FIB: ICD-10-CM

## 2017-05-02 DIAGNOSIS — I49.9 CARDIAC ARRHYTHMIA, UNSPECIFIED CARDIAC ARRHYTHMIA TYPE: ICD-10-CM

## 2017-05-02 DIAGNOSIS — I48.91 ATRIAL FIBRILLATION: ICD-10-CM

## 2017-05-02 DIAGNOSIS — I49.5 SINUS BRADY-TACHY SYNDROME: ICD-10-CM

## 2017-05-02 DIAGNOSIS — I25.10 CARDIOVASCULAR DISEASE: ICD-10-CM

## 2017-05-02 DIAGNOSIS — R00.2 PALPITATIONS: Primary | ICD-10-CM

## 2017-05-02 PROBLEM — R79.89 POSITIVE D DIMER: Status: ACTIVE | Noted: 2017-05-02

## 2017-05-02 LAB
ALBUMIN SERPL BCP-MCNC: 3.6 G/DL
ALP SERPL-CCNC: 80 U/L
ALT SERPL W/O P-5'-P-CCNC: 17 U/L
ANION GAP SERPL CALC-SCNC: 10 MMOL/L
APTT BLDCRRT: 22.4 SEC
AST SERPL-CCNC: 17 U/L
BASOPHILS # BLD AUTO: 0.03 K/UL
BASOPHILS NFR BLD: 0.7 %
BILIRUB SERPL-MCNC: 0.6 MG/DL
BNP SERPL-MCNC: 113 PG/ML
BUN SERPL-MCNC: 16 MG/DL
CALCIUM SERPL-MCNC: 8.8 MG/DL
CHLORIDE SERPL-SCNC: 105 MMOL/L
CK MB SERPL-MCNC: 4.4 NG/ML
CK MB SERPL-RTO: 1.5 %
CK SERPL-CCNC: 295 U/L
CK SERPL-CCNC: 295 U/L
CO2 SERPL-SCNC: 28 MMOL/L
CREAT SERPL-MCNC: 1.1 MG/DL
D DIMER PPP IA.FEU-MCNC: 1.12 MG/L FEU
DIFFERENTIAL METHOD: ABNORMAL
EOSINOPHIL # BLD AUTO: 0.1 K/UL
EOSINOPHIL NFR BLD: 2.5 %
ERYTHROCYTE [DISTWIDTH] IN BLOOD BY AUTOMATED COUNT: 14.1 %
EST. GFR  (AFRICAN AMERICAN): >60 ML/MIN/1.73 M^2
EST. GFR  (NON AFRICAN AMERICAN): 59 ML/MIN/1.73 M^2
GLUCOSE SERPL-MCNC: 118 MG/DL
HCT VFR BLD AUTO: 37.5 %
HGB BLD-MCNC: 12.5 G/DL
INR PPP: 1
LYMPHOCYTES # BLD AUTO: 1.2 K/UL
LYMPHOCYTES NFR BLD: 26.9 %
MAGNESIUM SERPL-MCNC: 2 MG/DL
MAGNESIUM SERPL-MCNC: 2.2 MG/DL
MCH RBC QN AUTO: 31.2 PG
MCHC RBC AUTO-ENTMCNC: 33.3 %
MCV RBC AUTO: 94 FL
MONOCYTES # BLD AUTO: 0.6 K/UL
MONOCYTES NFR BLD: 12.3 %
NEUTROPHILS # BLD AUTO: 2.6 K/UL
NEUTROPHILS NFR BLD: 57.6 %
PHOSPHATE SERPL-MCNC: 2.6 MG/DL
PLATELET # BLD AUTO: 214 K/UL
PMV BLD AUTO: 9.7 FL
POCT GLUCOSE: 129 MG/DL (ref 70–110)
POTASSIUM SERPL-SCNC: 3.2 MMOL/L
PROT SERPL-MCNC: 7 G/DL
PROTHROMBIN TIME: 9.9 SEC
RBC # BLD AUTO: 4.01 M/UL
SODIUM SERPL-SCNC: 143 MMOL/L
TROPONIN I SERPL DL<=0.01 NG/ML-MCNC: 0.01 NG/ML
TSH SERPL DL<=0.005 MIU/L-ACNC: 3.2 UIU/ML
WBC # BLD AUTO: 4.46 K/UL

## 2017-05-02 PROCEDURE — 84484 ASSAY OF TROPONIN QUANT: CPT | Mod: 91

## 2017-05-02 PROCEDURE — G0378 HOSPITAL OBSERVATION PER HR: HCPCS

## 2017-05-02 PROCEDURE — 83880 ASSAY OF NATRIURETIC PEPTIDE: CPT

## 2017-05-02 PROCEDURE — 93005 ELECTROCARDIOGRAM TRACING: CPT

## 2017-05-02 PROCEDURE — 85610 PROTHROMBIN TIME: CPT

## 2017-05-02 PROCEDURE — 84100 ASSAY OF PHOSPHORUS: CPT

## 2017-05-02 PROCEDURE — 85730 THROMBOPLASTIN TIME PARTIAL: CPT

## 2017-05-02 PROCEDURE — 36415 COLL VENOUS BLD VENIPUNCTURE: CPT

## 2017-05-02 PROCEDURE — 25000003 PHARM REV CODE 250: Performed by: INTERNAL MEDICINE

## 2017-05-02 PROCEDURE — 83735 ASSAY OF MAGNESIUM: CPT | Mod: 91

## 2017-05-02 PROCEDURE — 84443 ASSAY THYROID STIM HORMONE: CPT

## 2017-05-02 PROCEDURE — 25000003 PHARM REV CODE 250: Performed by: SURGERY

## 2017-05-02 PROCEDURE — 80053 COMPREHEN METABOLIC PANEL: CPT

## 2017-05-02 PROCEDURE — 27000339 *HC DAILY SUPPLY KIT

## 2017-05-02 PROCEDURE — 99220 PR INITIAL OBSERVATION CARE,LEVL III: CPT | Mod: ,,, | Performed by: INTERNAL MEDICINE

## 2017-05-02 PROCEDURE — 85379 FIBRIN DEGRADATION QUANT: CPT

## 2017-05-02 PROCEDURE — 85025 COMPLETE CBC W/AUTO DIFF WBC: CPT

## 2017-05-02 PROCEDURE — 82553 CREATINE MB FRACTION: CPT

## 2017-05-02 PROCEDURE — 83735 ASSAY OF MAGNESIUM: CPT

## 2017-05-02 PROCEDURE — 93010 ELECTROCARDIOGRAM REPORT: CPT | Mod: ,,, | Performed by: INTERNAL MEDICINE

## 2017-05-02 PROCEDURE — 94760 N-INVAS EAR/PLS OXIMETRY 1: CPT

## 2017-05-02 PROCEDURE — 93010 ELECTROCARDIOGRAM REPORT: CPT | Mod: 77,,, | Performed by: INTERNAL MEDICINE

## 2017-05-02 PROCEDURE — 99285 EMERGENCY DEPT VISIT HI MDM: CPT

## 2017-05-02 PROCEDURE — 27000492 HC SLEEVE, SCD T/L

## 2017-05-02 RX ORDER — TAMSULOSIN HYDROCHLORIDE 0.4 MG/1
0.4 CAPSULE ORAL NIGHTLY
Status: DISCONTINUED | OUTPATIENT
Start: 2017-05-02 | End: 2017-05-04 | Stop reason: HOSPADM

## 2017-05-02 RX ORDER — LISINOPRIL 10 MG/1
10 TABLET ORAL DAILY
Status: DISCONTINUED | OUTPATIENT
Start: 2017-05-02 | End: 2017-05-04 | Stop reason: HOSPADM

## 2017-05-02 RX ORDER — TAMSULOSIN HYDROCHLORIDE 0.4 MG/1
0.4 CAPSULE ORAL DAILY
Status: DISCONTINUED | OUTPATIENT
Start: 2017-05-02 | End: 2017-05-02

## 2017-05-02 RX ORDER — IBUPROFEN 200 MG
24 TABLET ORAL
Status: DISCONTINUED | OUTPATIENT
Start: 2017-05-02 | End: 2017-05-04 | Stop reason: HOSPADM

## 2017-05-02 RX ORDER — ACETAMINOPHEN 325 MG/1
650 TABLET ORAL EVERY 8 HOURS PRN
Status: DISCONTINUED | OUTPATIENT
Start: 2017-05-02 | End: 2017-05-04 | Stop reason: HOSPADM

## 2017-05-02 RX ORDER — GLIMEPIRIDE 1 MG/1
4 TABLET ORAL
Status: DISCONTINUED | OUTPATIENT
Start: 2017-05-03 | End: 2017-05-04 | Stop reason: HOSPADM

## 2017-05-02 RX ORDER — NAPROXEN SODIUM 220 MG/1
81 TABLET, FILM COATED ORAL
Status: COMPLETED | OUTPATIENT
Start: 2017-05-02 | End: 2017-05-02

## 2017-05-02 RX ORDER — IBUPROFEN 200 MG
16 TABLET ORAL
Status: DISCONTINUED | OUTPATIENT
Start: 2017-05-02 | End: 2017-05-04 | Stop reason: HOSPADM

## 2017-05-02 RX ORDER — ONDANSETRON 2 MG/ML
4 INJECTION INTRAMUSCULAR; INTRAVENOUS EVERY 8 HOURS PRN
Status: DISCONTINUED | OUTPATIENT
Start: 2017-05-02 | End: 2017-05-04 | Stop reason: HOSPADM

## 2017-05-02 RX ORDER — PINDOLOL 10 MG/1
10 TABLET ORAL 2 TIMES DAILY
Status: DISCONTINUED | OUTPATIENT
Start: 2017-05-02 | End: 2017-05-04 | Stop reason: HOSPADM

## 2017-05-02 RX ORDER — PANTOPRAZOLE SODIUM 40 MG/1
40 TABLET, DELAYED RELEASE ORAL DAILY
Status: DISCONTINUED | OUTPATIENT
Start: 2017-05-02 | End: 2017-05-04 | Stop reason: HOSPADM

## 2017-05-02 RX ORDER — ALBUTEROL SULFATE 2.5 MG/.5ML
2.5 SOLUTION RESPIRATORY (INHALATION) EVERY 6 HOURS PRN
Status: DISCONTINUED | OUTPATIENT
Start: 2017-05-02 | End: 2017-05-03

## 2017-05-02 RX ORDER — LABETALOL 100 MG/1
400 TABLET, FILM COATED ORAL 2 TIMES DAILY
Status: DISCONTINUED | OUTPATIENT
Start: 2017-05-02 | End: 2017-05-02

## 2017-05-02 RX ORDER — METFORMIN HYDROCHLORIDE 500 MG/1
500 TABLET ORAL
Status: DISCONTINUED | OUTPATIENT
Start: 2017-05-03 | End: 2017-05-04 | Stop reason: HOSPADM

## 2017-05-02 RX ORDER — METOPROLOL TARTRATE 25 MG/1
25 TABLET, FILM COATED ORAL 2 TIMES DAILY PRN
Qty: 20 TABLET | Refills: 0 | Status: SHIPPED | OUTPATIENT
Start: 2017-05-02 | End: 2017-05-04 | Stop reason: HOSPADM

## 2017-05-02 RX ORDER — POTASSIUM CHLORIDE 20 MEQ/1
40 TABLET, EXTENDED RELEASE ORAL ONCE
Status: COMPLETED | OUTPATIENT
Start: 2017-05-02 | End: 2017-05-02

## 2017-05-02 RX ORDER — ATORVASTATIN CALCIUM 40 MG/1
40 TABLET, FILM COATED ORAL NIGHTLY
Status: DISCONTINUED | OUTPATIENT
Start: 2017-05-02 | End: 2017-05-04 | Stop reason: HOSPADM

## 2017-05-02 RX ORDER — FUROSEMIDE 40 MG/1
40 TABLET ORAL DAILY
Status: DISCONTINUED | OUTPATIENT
Start: 2017-05-02 | End: 2017-05-04 | Stop reason: HOSPADM

## 2017-05-02 RX ORDER — CILOSTAZOL 50 MG/1
100 TABLET ORAL DAILY
Status: DISCONTINUED | OUTPATIENT
Start: 2017-05-02 | End: 2017-05-03

## 2017-05-02 RX ORDER — IPRATROPIUM BROMIDE 0.5 MG/2.5ML
0.5 SOLUTION RESPIRATORY (INHALATION) EVERY 6 HOURS PRN
Status: DISCONTINUED | OUTPATIENT
Start: 2017-05-02 | End: 2017-05-03

## 2017-05-02 RX ORDER — ATORVASTATIN CALCIUM 40 MG/1
40 TABLET, FILM COATED ORAL DAILY
Status: DISCONTINUED | OUTPATIENT
Start: 2017-05-02 | End: 2017-05-02

## 2017-05-02 RX ORDER — GLUCAGON 1 MG
1 KIT INJECTION
Status: DISCONTINUED | OUTPATIENT
Start: 2017-05-02 | End: 2017-05-04 | Stop reason: HOSPADM

## 2017-05-02 RX ORDER — FLUTICASONE PROPIONATE 50 MCG
1 SPRAY, SUSPENSION (ML) NASAL DAILY
Status: DISCONTINUED | OUTPATIENT
Start: 2017-05-02 | End: 2017-05-04 | Stop reason: HOSPADM

## 2017-05-02 RX ORDER — ASPIRIN 81 MG/1
81 TABLET ORAL DAILY
Status: DISCONTINUED | OUTPATIENT
Start: 2017-05-02 | End: 2017-05-03

## 2017-05-02 RX ORDER — PINDOLOL 10 MG/1
5 TABLET ORAL 2 TIMES DAILY
Status: DISCONTINUED | OUTPATIENT
Start: 2017-05-02 | End: 2017-05-02

## 2017-05-02 RX ORDER — AMLODIPINE BESYLATE 10 MG/1
10 TABLET ORAL DAILY
Status: DISCONTINUED | OUTPATIENT
Start: 2017-05-02 | End: 2017-05-04 | Stop reason: HOSPADM

## 2017-05-02 RX ORDER — CLONIDINE HYDROCHLORIDE 0.1 MG/1
0.1 TABLET ORAL EVERY 6 HOURS PRN
Status: DISCONTINUED | OUTPATIENT
Start: 2017-05-02 | End: 2017-05-04 | Stop reason: HOSPADM

## 2017-05-02 RX ORDER — INSULIN ASPART 100 [IU]/ML
0-5 INJECTION, SOLUTION INTRAVENOUS; SUBCUTANEOUS
Status: DISCONTINUED | OUTPATIENT
Start: 2017-05-02 | End: 2017-05-04 | Stop reason: HOSPADM

## 2017-05-02 RX ADMIN — TAMSULOSIN HYDROCHLORIDE 0.4 MG: 0.4 CAPSULE ORAL at 08:05

## 2017-05-02 RX ADMIN — POTASSIUM CHLORIDE 40 MEQ: 1500 TABLET, EXTENDED RELEASE ORAL at 05:05

## 2017-05-02 RX ADMIN — PINDOLOL 10 MG: 10 TABLET ORAL at 08:05

## 2017-05-02 RX ADMIN — ASPIRIN 81 MG: 81 TABLET, CHEWABLE ORAL at 09:05

## 2017-05-02 RX ADMIN — ATORVASTATIN CALCIUM 40 MG: 40 TABLET, FILM COATED ORAL at 08:05

## 2017-05-02 NOTE — ED NOTES
Pt brought to room 321 by er nurse. Pt stable ,daughter at bedside. Report given to driss vallecillo.

## 2017-05-02 NOTE — ED AVS SNAPSHOT
[facility]  [address] [facility] [address]               Aleksandr Schultz   2017  9:15 AM   ED    Description:  Male : 1926   Department:  [facility]           Your Care was Coordinated By:     Provider Role From To    Robert Hancock MD Attending Provider 17 0915 --      Reason for Visit     Palpitations           Diagnoses this Visit        Comments    Palpitations    -  Primary       ED Disposition     ED Disposition Condition Comment    Discharge              To Do List           Follow-up Information     Follow up with Lynne Buckner MD. Go in 1 day.    Specialty:  Cardiology    Contact information:     [facility]  8TH FLOOR  [facility] [address]  [phone]         These Medications        Disp Refills Start End    metoprolol tartrate (LOPRESSOR) 25 MG tablet 20 tablet 0 2017    Take 1 tablet (25 mg total) by mouth 2 (two) times daily as needed (With a heart rate over 140/palpitations). - Oral    Pharmacy: [facility] - [address] Ph #: [phone]         [facility]     [facility] On Call Nurse Care Line -  Assistance  Unless otherwise directed by your provider, please contact [facility], our nurse care line that is available for  assistance.     Registered nurses in the [facility] provide: appointment scheduling, clinical advisement, health education, and other advisory services.  Call: [phone] (toll free)               Medications           Message regarding Medications     Verify the changes and/or additions to your medication regime listed below are the same as discussed with your clinician today.  If any of these changes or additions are incorrect, please notify your healthcare provider.        START taking these NEW medications        Refills    metoprolol tartrate (LOPRESSOR) 25 MG tablet 0    Sig: Take 1  tablet (25 mg total) by mouth 2 (two) times daily as needed (With a heart rate over 140/palpitations).    Class: Normal    Route: Oral      These medications were administered today        Dose Freq    aspirin chewable tablet 81 mg 81 mg ED 1 Time    Sig: Take 1 tablet (81 mg total) by mouth ED 1 Time.    Class: Normal    Route: Oral           Verify that the below list of medications is an accurate representation of the medications you are currently taking.  If none reported, the list may be blank. If incorrect, please contact your healthcare provider. Carry this list with you in case of emergency.           Current Medications     albuterol (PROVENTIL) 2.5 mg /3 mL (0.083 %) nebulizer solution Take 2.5 mg by nebulization 4 (four) times daily as needed.     amlodipine (NORVASC) 10 MG tablet TAKE 1 TABLET ONE TIME DAILY    aspirin (ECOTRIN) 81 MG EC tablet Take 81 mg by mouth once daily.      atorvastatin (LIPITOR) 40 MG tablet Take 40 mg by mouth once daily.    cilostazol (PLETAL) 100 MG Tab Take 100 mg by mouth once daily.     cinnamon bark (CINNAMON) 500 mg capsule Take 500 mg by mouth once daily.    cyclobenzaprine (FLEXERIL) 10 MG tablet Take 10 mg by mouth 3 (three) times daily as needed.      fish oil-omega-3 fatty acids 300-1,000 mg capsule Take 2 g by mouth once daily.      FLAXSEED OIL ORAL Take 1 tablet by mouth once daily.     folic acid (FOLVITE) 1 MG tablet Take 1 mg by mouth once daily.    furosemide (LASIX) 40 MG tablet TAKE 1 TABLET ONE TIME DAILY    garlic 1,000 mg Cap Take 1 tablet by mouth 2 (two) times daily.     glimepiride (AMARYL) 4 MG tablet Take 4 mg by mouth daily with breakfast. Pt taking one pill twice a day.    ipratropium (ATROVENT) 0.02 % nebulizer solution Take 500 mcg by nebulization 4 (four) times daily. PRN    labetalol (NORMODYNE) 200 MG tablet TAKE 2 TABLETS TWICE DAILY    lisinopril 10 MG tablet Take 1 tablet (10 mg total) by mouth once daily.    loratadine 10 mg Cap Take 1  tablet by mouth once daily.     metformin (GLUCOPHAGE) 500 MG tablet Take 500 mg by mouth daily with breakfast.    mometasone (NASONEX) 50 mcg/actuation nasal spray 2 sprays by Nasal route daily as needed.     nitroGLYCERIN (NITROSTAT) 0.4 MG SL tablet Place 0.4 mg under the tongue every 5 (five) minutes as needed (PRN).     potassium chloride (MICRO-K) 10 MEQ CpSR Take 1 capsule (10 mEq total) by mouth once daily.    vitamin D 1000 units Tab Take 1,000 Units by mouth once daily.    metoprolol tartrate (LOPRESSOR) 25 MG tablet Take 1 tablet (25 mg total) by mouth 2 (two) times daily as needed (With a heart rate over 140/palpitations).    ranitidine (ZANTAC) 150 MG tablet Take 150 mg by mouth 2 (two) times daily.      tamsulosin (FLOMAX) 0.4 mg Cp24 Take 0.4 mg by mouth once daily.           Clinical Reference Information           Your Vitals Were     BP Pulse Temp Resp SpO2       159/57 55 97.4 °F (36.3 °C) (Tympanic) 18 95%       Allergies as of 5/2/2017        Reactions    Iodinated Contrast Media - Oral And Iv Dye       Immunizations Administered on Date of Encounter - 5/2/2017     None      ED Micro, Lab, POCT     Start Ordered       Status Ordering Provider    05/02/17 0918 05/02/17 0917  Phosphorus  STAT      Final result     05/02/17 0918 05/02/17 0917  Magnesium  STAT      Final result     05/02/17 0918 05/02/17 0917  Urinalysis  STAT      Acknowledged     05/02/17 0918 05/02/17 0917  TSH  STAT      Final result     05/02/17 0918 05/02/17 0917  Comprehensive metabolic panel  STAT      Final result     05/02/17 0918 05/02/17 0917  CBC auto differential  STAT      Final result     05/02/17 0918 05/02/17 0917  Troponin I  Now then every 6 hours     Start Status   05/02/17 0918 Final result   05/02/17 1518 Acknowledged   05/02/17 2118 Scheduled   05/03/17 0318 Scheduled   05/03/17 0918 Scheduled   05/03/17 1518 Scheduled   05/03/17 2118 Scheduled   05/04/17 0318 Scheduled   05/04/17 0918 Scheduled   05/04/17  1518 Scheduled   05/04/17 2118 Scheduled   05/05/17 0318 Scheduled   05/05/17 0918 Scheduled   05/05/17 1518 Scheduled   05/05/17 2118 Scheduled       Acknowledged     05/02/17 0918 05/02/17 0917  CK  STAT      Final result     05/02/17 0918 05/02/17 0917  CK-MB  STAT      Final result     05/02/17 0918 05/02/17 0917  Brain natriuretic peptide  STAT      Final result     05/02/17 0918 05/02/17 0917  Protime-INR  STAT      Final result     05/02/17 0918 05/02/17 0917  APTT  STAT      Final result     05/02/17 0918 05/02/17 0917  D dimer, quantitative  STAT      Final result       ED Imaging Orders     Start Ordered       Status Ordering Provider    05/02/17 0918 05/02/17 0917  X-Ray Chest 1 View  1 time imaging      Final result         Discharge Instructions         Understanding Heart Palpitations    Heart palpitations are a symptom. Its the feeling you have when your heartbeat seems to be racing, pounding, skipping, or fluttering. Heart palpitations are most often felt in the chest. Sometimes, they may also be felt in the neck.  What causes heart palpitations?  In most cases, heart palpitations are caused by:  · Stress or anxiety  · Exercise  · Pregnancy  · Some medicines  · Caffeine  · Nicotine  · Alcohol  · Illegal drugs, such as cocaine  · Health problems, such as anemia or overactive thyroid  In some cases, heart palpitations may be caused by a problem with the heart. Abnormal heart rhythms (arrhythmias) are the main concern. They may need to be managed by you and your healthcare provider or treated right away.  How are heart palpitations treated?  Treatments for heart palpitations depend on the cause. Options may include:  · Managing the things that trigger your heart palpitations. This could mean:  ¨ Learning ways to reduce stress and anxiety  ¨ Avoiding caffeine, nicotine, alcohol, or illegal drugs  ¨ Stopping the use of certain medicines, under your doctors guidance  · Medicines, procedures, or surgery  to treat an arrhythmia or other health problem that is causing your symptoms  What are the complications of heart palpitations?  Complications of heart palpitations are rare unless they are caused by a problem such as an arrhythmia. In such cases, complications can include:  · Fainting  · Heart failure. This problem occurs when the heart is so weak it no longer pumps blood well.  · Blood clots and stroke  · Sudden cardiac arrest. This problem occurs when the heart suddenly stops beating.  When should I call my healthcare provider?  Call your healthcare provider right away if you have any of these:  · Fever of 100.4°F (38°C) or higher, or as directed  · Symptoms that dont get better with treatment, or symptoms that get worse  · New symptoms, such as chest pain, shortness of breath, dizziness, or fainting   Date Last Reviewed: 5/1/2016  © 6366-2401 [employer]. [address] [place] [address]. All rights reserved. This information is not intended as a substitute for professional medical care. Always follow your healthcare professional's instructions.          Your Scheduled Appointments     May 03, 2017 10:00 AM CDT   24 Hour Holter with HOLTER, METATAMEKAE   [facility] [facility] ([facility])    [address] [place], [place] [address] [place] [id number] [phone]            May 10, 2017  9:10 AM CDT   Non-Fasting Lab with LAB, [facility] ([facility])    [address] [place] [id number]   [phone]               [facility] complies with applicable Federal civil rights laws and does not discriminate on the basis of race, color, national origin, age, disability, or sex.        Language Assistance Services     ATTENTION: Language assistance services are available, free of charge. Please call [phone].      ATENCIÓN: Si habla pablito, tiene a elder disposición servicios gratuitos de asistencia lingüística.  Denisse mosquera [phone].     KASSY Ý: N?u b?n nói Ti?ng Vi?t, có các d?ch v? h? tr? rogerioôn ng? mi?n phí dành cho b?n. G?i s? [phone].

## 2017-05-02 NOTE — H&P
[facility]  History & Physical    Patient Name: Aleksandr Schultz  MRN: [id number]  Admission Date: 5/2/2017  Attending Physician: Issac Hobson MD   Primary Care Provider: Ascencion Cedeño MD         Patient information was obtained from patient, past medical records and ER records.     Subjective:     Principal Problem:palpitations  Chief Complaint:   Chief Complaint   Patient presents with    Palpitations        HPI: Aleksandr Schultz is a 89 y.o. Male who is a patient of cardiology at [facility] [place]   With PMH of  AAA, PAD, DM-2,carotid artery disease ,  Hyperlipidemia and aortic stenosis who presented to ER with 5 days history of palpitations.  He reports at times his HR races and goes into 100s  And later drops into 40s. He was supposed to have Holter monitor by cardiology [facility] [place]. He reports no chest pains ; no shortness of breath with these episodes .   He was placed in observation ; so far troponins are negative.  EKG and telemetry is reviewed by Dr conte   Plan ;change labetalol to pindolol.            Past Medical History:   Diagnosis Date    Aortic aneurysm     Asthma, chronic     Chronic kidney disease     CKD (chronic kidney disease)     COPD (chronic obstructive pulmonary disease)     Coronary artery disease     Diabetes mellitus     Glaucoma (increased eye pressure)     Hypertension     Peripheral vascular disease     Prostate cancer     Sleep apnea     cpap    Small bowel obstruction     Status post balloon angioplasty of pulmonary artery branches     1980    Stroke     1991       Past Surgical History:   Procedure Laterality Date    BACK SURGERY      x2 lumbar    CAROTID ARTERY ANGIOPLASTY      HERNIA REPAIR      PI OU      laser PI OU    SHOULDER SURGERY      rotator bilateral       Review of patient's allergies indicates:   Allergen Reactions    Iodinated contrast media - oral and iv dye        No current facility-administered medications on file prior to  encounter.      Current Outpatient Prescriptions on File Prior to Encounter   Medication Sig    albuterol (PROVENTIL) 2.5 mg /3 mL (0.083 %) nebulizer solution Take 2.5 mg by nebulization 4 (four) times daily as needed.     amlodipine (NORVASC) 10 MG tablet TAKE 1 TABLET ONE TIME DAILY    aspirin (ECOTRIN) 81 MG EC tablet Take 81 mg by mouth once daily.      atorvastatin (LIPITOR) 40 MG tablet Take 40 mg by mouth once daily.    cilostazol (PLETAL) 100 MG Tab Take 100 mg by mouth once daily.     cinnamon bark (CINNAMON) 500 mg capsule Take 500 mg by mouth once daily.    cyclobenzaprine (FLEXERIL) 10 MG tablet Take 10 mg by mouth 3 (three) times daily as needed.      fish oil-omega-3 fatty acids 300-1,000 mg capsule Take 2 g by mouth once daily.      FLAXSEED OIL ORAL Take 1 tablet by mouth once daily.     folic acid (FOLVITE) 1 MG tablet Take 1 mg by mouth once daily.    furosemide (LASIX) 40 MG tablet TAKE 1 TABLET ONE TIME DAILY    garlic 1,000 mg Cap Take 1 tablet by mouth 2 (two) times daily.     glimepiride (AMARYL) 4 MG tablet Take 4 mg by mouth daily with breakfast. Pt taking one pill twice a day.    ipratropium (ATROVENT) 0.02 % nebulizer solution Take 500 mcg by nebulization 4 (four) times daily. PRN    labetalol (NORMODYNE) 200 MG tablet TAKE 2 TABLETS TWICE DAILY    lisinopril 10 MG tablet Take 1 tablet (10 mg total) by mouth once daily.    loratadine 10 mg Cap Take 1 tablet by mouth once daily.     metformin (GLUCOPHAGE) 500 MG tablet Take 500 mg by mouth daily with breakfast.    mometasone (NASONEX) 50 mcg/actuation nasal spray 2 sprays by Nasal route daily as needed.     nitroGLYCERIN (NITROSTAT) 0.4 MG SL tablet Place 0.4 mg under the tongue every 5 (five) minutes as needed (PRN).     potassium chloride (MICRO-K) 10 MEQ CpSR Take 1 capsule (10 mEq total) by mouth once daily.    ranitidine (ZANTAC) 150 MG tablet Take 150 mg by mouth 2 (two) times daily.      tamsulosin (FLOMAX)  0.4 mg Cp24 Take 0.4 mg by mouth once daily.    vitamin D 1000 units Tab Take 1,000 Units by mouth once daily.     Family History     Problem Relation (Age of Onset)    Heart attack Father    Heart disease Father        Social History Main Topics    Smoking status: Former Smoker     Types: Cigarettes     Quit date: 10/11/1977    Smokeless tobacco: Not on file    Alcohol use Yes      Comment: social    Drug use: No    Sexual activity: Not Currently     Review of Systems   Constitutional: Negative for chills and fever.   HENT: Positive for hearing loss. Negative for congestion, postnasal drip and sore throat.    Eyes: Negative for photophobia.   Respiratory: Negative for chest tightness and shortness of breath.    Cardiovascular: Positive for leg swelling. Negative for chest pain.        Leg edema     Gastrointestinal: Negative for abdominal distention, abdominal pain, blood in stool and vomiting.   Genitourinary: Negative for dysuria, flank pain and hematuria.   Musculoskeletal: Positive for back pain, gait problem and myalgias.   Skin: Negative for pallor.   Neurological: Negative for dizziness, seizures, facial asymmetry, speech difficulty and numbness.   Hematological: Does not bruise/bleed easily.   Psychiatric/Behavioral: Negative for agitation and suicidal ideas. The patient is nervous/anxious.      Objective:     Vital Signs (Most Recent):  Temp: 97.6 °F (36.4 °C) (05/02/17 1400)  Pulse: (!) 51 (05/02/17 1617)  Resp: 16 (05/02/17 1400)  BP: (!) 168/70 (05/02/17 1449)  SpO2: 96 % (05/02/17 1400) Vital Signs (24h Range):  Temp:  [97.4 °F (36.3 °C)-97.6 °F (36.4 °C)] 97.6 °F (36.4 °C)  Pulse:  [] 51  Resp:  [15-21] 16  SpO2:  [94 %-98 %] 96 %  BP: (153-195)/(53-93) 168/70     Weight: 82.6 kg (182 lb 1.6 oz)  Body mass index is 26.89 kg/(m^2).    Physical Exam   Constitutional: He is oriented to person, place, and time. He appears well-developed and well-nourished.   HENT:   Head: Normocephalic and  atraumatic.   Nose: Nose normal.   Mouth/Throat: Oropharynx is clear and moist.   Eyes: Conjunctivae and EOM are normal. Pupils are equal, round, and reactive to light.   Neck: Normal range of motion. Neck supple. No JVD present. No thyromegaly present.   Cardiovascular: Normal rate, regular rhythm, normal heart sounds and intact distal pulses.    Pulmonary/Chest: Effort normal and breath sounds normal.   Abdominal: Soft. Bowel sounds are normal. He exhibits no distension and no mass. There is no tenderness. There is no guarding.   Musculoskeletal: Normal range of motion. He exhibits edema.   Lymphadenopathy:     He has no cervical adenopathy.   Neurological: He is alert and oriented to person, place, and time. He has normal reflexes. No cranial nerve deficit.   Skin: Skin is warm and dry. No rash noted. No pallor.   Psychiatric: He has a normal mood and affect.   Nursing note and vitals reviewed.       Significant Labs:   CBC:   Recent Labs  Lab 05/02/17  0932   WBC 4.46   HGB 12.5*   HCT 37.5*        CMP:   Recent Labs  Lab 05/02/17  0932      K 3.2*      CO2 28   *   BUN 16   CREATININE 1.1   CALCIUM 8.8   PROT 7.0   ALBUMIN 3.6   BILITOT 0.6   ALKPHOS 80   AST 17   ALT 17   ANIONGAP 10   EGFRNONAA 59*     Troponin:   Recent Labs  Lab 05/02/17  0933 05/02/17  1534   TROPONINI 0.011 0.006     TSH:   Recent Labs  Lab 05/02/17  0933   TSH 3.204     BNP    Recent Labs  Lab 05/02/17  0932   *     Significant Imaging:   Chest, 1 view: The heart is normal in size.  Calcified atheromatous disease affects the aorta.  There is congestion of the pulmonary vascularity.  No consolidation.  There may be small bilateral pleural effusions.  Age-appropriate degenerative changes affect the skeleton.  EKG:    Sinus bradycardia with 1st degree A-V block with Premature atrial complexes in a pattern of bigeminy  Left axis deviation  Inferior infarct ,age undetermined  Abnormal ECG  No previous ECGs  available        Assessment/Plan:     * Sinus noemy-tachy syndrome  Dr conte is not convinced about above   Will try pindolol ;he thinks atrial tachycardia is likely   DC labetolol  Replace K  Check magnesium   Troponin's  Telemetry        PAD (peripheral artery disease)  Continue cilastazole and ASA      Aortic valve disorder  Continue lasix  Watch for dehydration  Last echo reviewed         Palpitations  Discussed with Dr conte   Will change labetolol to pindolol  Continue telemetry /holter      Positive D dimer  He  is allergic to iodinated contrast; on metformin; CKD   All these make me nervous about CTA chest to r/o PE     Will do VQ scan     Compression device for dvt prophlaxis    VTE Risk Mitigation         Ordered     Place sequential compression device  Until discontinued      05/02/17 1651     Medium Risk of VTE  Once      05/02/17 1358     Place sequential compression device  Until discontinued      05/02/17 1359        Issac Hobson MD  Department of Hospital Medicine   [facility]

## 2017-05-02 NOTE — SUBJECTIVE & OBJECTIVE
Past Medical History:   Diagnosis Date    Aortic aneurysm     Asthma, chronic     Chronic kidney disease     CKD (chronic kidney disease)     COPD (chronic obstructive pulmonary disease)     Coronary artery disease     Diabetes mellitus     Glaucoma (increased eye pressure)     Hypertension     Peripheral vascular disease     Prostate cancer     Sleep apnea     cpap    Small bowel obstruction     Status post balloon angioplasty of pulmonary artery branches     1980    Stroke     1991       Past Surgical History:   Procedure Laterality Date    BACK SURGERY      x2 lumbar    CAROTID ARTERY ANGIOPLASTY      HERNIA REPAIR      PI OU      laser PI OU    SHOULDER SURGERY      rotator bilateral       Review of patient's allergies indicates:   Allergen Reactions    Iodinated contrast media - oral and iv dye        No current facility-administered medications on file prior to encounter.      Current Outpatient Prescriptions on File Prior to Encounter   Medication Sig    albuterol (PROVENTIL) 2.5 mg /3 mL (0.083 %) nebulizer solution Take 2.5 mg by nebulization 4 (four) times daily as needed.     amlodipine (NORVASC) 10 MG tablet TAKE 1 TABLET ONE TIME DAILY    aspirin (ECOTRIN) 81 MG EC tablet Take 81 mg by mouth once daily.      atorvastatin (LIPITOR) 40 MG tablet Take 40 mg by mouth once daily.    cilostazol (PLETAL) 100 MG Tab Take 100 mg by mouth once daily.     cinnamon bark (CINNAMON) 500 mg capsule Take 500 mg by mouth once daily.    cyclobenzaprine (FLEXERIL) 10 MG tablet Take 10 mg by mouth 3 (three) times daily as needed.      fish oil-omega-3 fatty acids 300-1,000 mg capsule Take 2 g by mouth once daily.      FLAXSEED OIL ORAL Take 1 tablet by mouth once daily.     folic acid (FOLVITE) 1 MG tablet Take 1 mg by mouth once daily.    furosemide (LASIX) 40 MG tablet TAKE 1 TABLET ONE TIME DAILY    garlic 1,000 mg Cap Take 1 tablet by mouth 2 (two) times daily.     glimepiride (AMARYL)  4 MG tablet Take 4 mg by mouth daily with breakfast. Pt taking one pill twice a day.    ipratropium (ATROVENT) 0.02 % nebulizer solution Take 500 mcg by nebulization 4 (four) times daily. PRN    labetalol (NORMODYNE) 200 MG tablet TAKE 2 TABLETS TWICE DAILY    lisinopril 10 MG tablet Take 1 tablet (10 mg total) by mouth once daily.    loratadine 10 mg Cap Take 1 tablet by mouth once daily.     metformin (GLUCOPHAGE) 500 MG tablet Take 500 mg by mouth daily with breakfast.    mometasone (NASONEX) 50 mcg/actuation nasal spray 2 sprays by Nasal route daily as needed.     nitroGLYCERIN (NITROSTAT) 0.4 MG SL tablet Place 0.4 mg under the tongue every 5 (five) minutes as needed (PRN).     potassium chloride (MICRO-K) 10 MEQ CpSR Take 1 capsule (10 mEq total) by mouth once daily.    ranitidine (ZANTAC) 150 MG tablet Take 150 mg by mouth 2 (two) times daily.      tamsulosin (FLOMAX) 0.4 mg Cp24 Take 0.4 mg by mouth once daily.    vitamin D 1000 units Tab Take 1,000 Units by mouth once daily.     Family History     Problem Relation (Age of Onset)    Heart attack Father    Heart disease Father        Social History Main Topics    Smoking status: Former Smoker     Types: Cigarettes     Quit date: 10/11/1977    Smokeless tobacco: Not on file    Alcohol use Yes      Comment: social    Drug use: No    Sexual activity: Not Currently     Review of Systems   Constitutional: Negative for chills and fever.   HENT: Positive for hearing loss. Negative for congestion, postnasal drip and sore throat.    Eyes: Negative for photophobia.   Respiratory: Negative for chest tightness and shortness of breath.    Cardiovascular: Positive for leg swelling. Negative for chest pain.        Leg edema     Gastrointestinal: Negative for abdominal distention, abdominal pain, blood in stool and vomiting.   Genitourinary: Negative for dysuria, flank pain and hematuria.   Musculoskeletal: Positive for back pain, gait problem and myalgias.    Skin: Negative for pallor.   Neurological: Negative for dizziness, seizures, facial asymmetry, speech difficulty and numbness.   Hematological: Does not bruise/bleed easily.   Psychiatric/Behavioral: Negative for agitation and suicidal ideas. The patient is nervous/anxious.      Objective:     Vital Signs (Most Recent):  Temp: 97.6 °F (36.4 °C) (05/02/17 1400)  Pulse: (!) 51 (05/02/17 1617)  Resp: 16 (05/02/17 1400)  BP: (!) 168/70 (05/02/17 1449)  SpO2: 96 % (05/02/17 1400) Vital Signs (24h Range):  Temp:  [97.4 °F (36.3 °C)-97.6 °F (36.4 °C)] 97.6 °F (36.4 °C)  Pulse:  [] 51  Resp:  [15-21] 16  SpO2:  [94 %-98 %] 96 %  BP: (153-195)/(53-93) 168/70     Weight: 82.6 kg (182 lb 1.6 oz)  Body mass index is 26.89 kg/(m^2).    Physical Exam   Constitutional: He is oriented to person, place, and time. He appears well-developed and well-nourished.   HENT:   Head: Normocephalic and atraumatic.   Nose: Nose normal.   Mouth/Throat: Oropharynx is clear and moist.   Eyes: Conjunctivae and EOM are normal. Pupils are equal, round, and reactive to light.   Neck: Normal range of motion. Neck supple. No JVD present. No thyromegaly present.   Cardiovascular: Normal rate, regular rhythm, normal heart sounds and intact distal pulses.    Pulmonary/Chest: Effort normal and breath sounds normal.   Abdominal: Soft. Bowel sounds are normal. He exhibits no distension and no mass. There is no tenderness. There is no guarding.   Musculoskeletal: Normal range of motion. He exhibits edema.   Lymphadenopathy:     He has no cervical adenopathy.   Neurological: He is alert and oriented to person, place, and time. He has normal reflexes. No cranial nerve deficit.   Skin: Skin is warm and dry. No rash noted. No pallor.   Psychiatric: He has a normal mood and affect.   Nursing note and vitals reviewed.       Significant Labs:   CBC:   Recent Labs  Lab 05/02/17  0932   WBC 4.46   HGB 12.5*   HCT 37.5*        CMP:   Recent Labs  Lab  05/02/17  0932      K 3.2*      CO2 28   *   BUN 16   CREATININE 1.1   CALCIUM 8.8   PROT 7.0   ALBUMIN 3.6   BILITOT 0.6   ALKPHOS 80   AST 17   ALT 17   ANIONGAP 10   EGFRNONAA 59*     Troponin:   Recent Labs  Lab 05/02/17  0933 05/02/17  1534   TROPONINI 0.011 0.006     TSH:   Recent Labs  Lab 05/02/17  0933   TSH 3.204     BNP    Recent Labs  Lab 05/02/17  0932   *     Significant Imaging:   Chest, 1 view: The heart is normal in size.  Calcified atheromatous disease affects the aorta.  There is congestion of the pulmonary vascularity.  No consolidation.  There may be small bilateral pleural effusions.  Age-appropriate degenerative changes affect the skeleton.

## 2017-05-02 NOTE — TELEPHONE ENCOUNTER
Pt's daughter, José Miguel stated that pt could not go to the ER yesterday because they did not have a sitter for pt's wife. José Miguel stated that she is taking pt to the ER at [facility] today to be evaluated for palpitations.

## 2017-05-02 NOTE — ED PROVIDER NOTES
[facility]                                        May 2, 2017                   Chief Complaint  89 y.o. male with Palpitations    History of Present Illness  Aleksandr Schultz presents to the emergency room with palpitations this morning  Patient has been having on again off again palpitations last week or so, worse  Patient states he felt his heart racing last night, refuses to come to the ER then  He presents today with a sinus tachycardia & first degree AV block at 119 bpm  Patient denies any chest pain or shortness of breath, felt uncomfortable at home  Patient is a history of coronary artery disease and diabetes as well as PAD    The history is provided by the patient    Past Medical History   -- Aortic aneurysm    -- Asthma, chronic    -- Chronic kidney disease    -- CKD (chronic kidney disease)    -- COPD (chronic obstructive pulmonary disease)    -- Coronary artery disease    -- Diabetes mellitus    -- Glaucoma (increased eye pressure)    -- Hypertension    -- Peripheral vascular disease    -- Prostate cancer    -- Sleep apnea    -- Small bowel obstruction    -- Status post balloon angioplasty of pulmonary artery branches    -- Stroke      Past Surgical History   -- BACK SURGERY     -- CAROTID ARTERY ANGIOPLASTY     -- HERNIA REPAIR     -- PI OU     -- SHOULDER SURGERY        ALLERGIES: Iodine    Review of Systems and Physical Exam     Review of Systems  -- Constitution - no fever, denies fatigue, no weakness, no chills  -- Eyes - no tearing or redness, no visual disturbance  -- Ear, Nose - no tinnitus or earache, no nasal congestion or discharge  -- Mouth,Throat - no sore throat, no toothache, normal voice, normal swallowing  -- Respiratory - denies cough and congestion, no shortness of breath, no GONZALEZ  -- Cardiovascular - palpitations, denies claudication, no chest pain  -- Gastrointestinal - denies abdominal pain, nausea, vomiting, or diarrhea  -- Musculoskeletal - denies back pain,  negative for myalgias and arthralgias   -- Neurological - no headache, denies weakness or seizure; no LOC  -- Skin - denies pallor, rash, or changes in skin. no hives or welts noted    Vital Signs  --  His blood pressure is 160/68 (abnormal) and his pulse is 59 (abnormal).   -- His respiration is 16 and oxygen saturation is 97%.      Physical Exam  -- Nursing note and vitals reviewed  -- Head: Atraumatic. Normocephalic. No obvious abnormality  -- Eyes: Pupils are equal and reactive to light. Normal conjunctiva and lids  -- Cardiac: Regular tachycardia in the 130s  -- Pulmonary: Normal respiratory effort, breath sounds clear to auscultation  -- Abdominal: Soft, no tenderness. Normal bowel sounds. Normal liver edge  -- Musculoskeletal: Normal range of motion, no effusions. Joints stable   -- Neurological: No focal deficits. Showed good interaction with staff  -- Vascular: Posterior tibial, dorsalis pedis and radial pulses 2+ bilaterally      Emergency Room Course     Treatment and Evaluation  -- EKG shows a sinus tachycardia with first-degree AV block  -- Chest x-ray showed no infiltrate and showed no acute pathology   -- The magnesium within normal limits   -- TSH is within normal limits  -- The PT, PTT, and INR were within normal limits.    -- The troponin drawn in the ER today was within normal limits   -- PO 81 MG ASA given in today in the ER    Abnormal lab values  -- Phosphorus 2.6 (*)   -- Potassium 3.2 (*)   -- Glucose 118 (*)   -- eGFR  59 (*)   -- RBC 4.01 (*)   -- Hemoglobin 12.5 (*)   -- Hematocrit 37.5 (*)   --  (*)   -- D-Dimer 1.12 (*)     ED Physician Notes  -- 12:30 PM: I spoke with [facility] cardiology, wanted the patient to get his Holter   -- Cardiology asked me to prescribe the patient when necessary Toprol for tachycardia  -- Family uncomfortable bringing the patient home, they state he gets very weak at home  -- Will admit and observe overnight with [facility] cardiology consult, discussed  with Joce    Diagnosis  -- Cardiac arrhythmia, unspecified cardiac arrhythmia type.   -- A diagnosis of Palpitations was also pertinent to this visit.    Disposition and Plan  -- Disposition: observation  -- Condition: stable  -- Telemetry monitoring  -- Morning labs  -- SCD hoses  -- Home medications  -- Nausea medication when necessary  -- Pain medication when necessary  -- Hep-Lock IV  -- Routine monitoring  -- Bed rest until otherwise stated  -- Low salt diet  -- Protonix for GERD prophylaxis  -- EKG in the morning  -- Aspirin daily  -- Cardiology consult  -- Serial cardiac enzymes    This note is dictated on Dragon Natural Speaking word recognition program.  There are word recognition mistakes that are occasionally missed on review.           Robert Hancock MD  05/02/17 0374

## 2017-05-02 NOTE — SUBJECTIVE & OBJECTIVE
Past Medical History:   Diagnosis Date    Aortic aneurysm     Asthma, chronic     Chronic kidney disease     CKD (chronic kidney disease)     COPD (chronic obstructive pulmonary disease)     Coronary artery disease     Diabetes mellitus     Glaucoma (increased eye pressure)     Hypertension     Peripheral vascular disease     Prostate cancer     Sleep apnea     cpap    Small bowel obstruction     Status post balloon angioplasty of pulmonary artery branches     1980    Stroke     1991       Past Surgical History:   Procedure Laterality Date    BACK SURGERY      x2 lumbar    CAROTID ARTERY ANGIOPLASTY      HERNIA REPAIR      PI OU      laser PI OU    SHOULDER SURGERY      rotator bilateral       Review of patient's allergies indicates:   Allergen Reactions    Iodinated contrast media - oral and iv dye        No current facility-administered medications on file prior to encounter.      Current Outpatient Prescriptions on File Prior to Encounter   Medication Sig    albuterol (PROVENTIL) 2.5 mg /3 mL (0.083 %) nebulizer solution Take 2.5 mg by nebulization 4 (four) times daily as needed.     amlodipine (NORVASC) 10 MG tablet TAKE 1 TABLET ONE TIME DAILY    aspirin (ECOTRIN) 81 MG EC tablet Take 81 mg by mouth once daily.      atorvastatin (LIPITOR) 40 MG tablet Take 40 mg by mouth once daily.    cilostazol (PLETAL) 100 MG Tab Take 100 mg by mouth once daily.     cinnamon bark (CINNAMON) 500 mg capsule Take 500 mg by mouth once daily.    cyclobenzaprine (FLEXERIL) 10 MG tablet Take 10 mg by mouth 3 (three) times daily as needed.      fish oil-omega-3 fatty acids 300-1,000 mg capsule Take 2 g by mouth once daily.      FLAXSEED OIL ORAL Take 1 tablet by mouth once daily.     folic acid (FOLVITE) 1 MG tablet Take 1 mg by mouth once daily.    furosemide (LASIX) 40 MG tablet TAKE 1 TABLET ONE TIME DAILY    garlic 1,000 mg Cap Take 1 tablet by mouth 2 (two) times daily.     glimepiride (AMARYL)  4 MG tablet Take 4 mg by mouth daily with breakfast. Pt taking one pill twice a day.    ipratropium (ATROVENT) 0.02 % nebulizer solution Take 500 mcg by nebulization 4 (four) times daily. PRN    labetalol (NORMODYNE) 200 MG tablet TAKE 2 TABLETS TWICE DAILY    lisinopril 10 MG tablet Take 1 tablet (10 mg total) by mouth once daily.    loratadine 10 mg Cap Take 1 tablet by mouth once daily.     metformin (GLUCOPHAGE) 500 MG tablet Take 500 mg by mouth daily with breakfast.    mometasone (NASONEX) 50 mcg/actuation nasal spray 2 sprays by Nasal route daily as needed.     nitroGLYCERIN (NITROSTAT) 0.4 MG SL tablet Place 0.4 mg under the tongue every 5 (five) minutes as needed (PRN).     potassium chloride (MICRO-K) 10 MEQ CpSR Take 1 capsule (10 mEq total) by mouth once daily.    ranitidine (ZANTAC) 150 MG tablet Take 150 mg by mouth 2 (two) times daily.      tamsulosin (FLOMAX) 0.4 mg Cp24 Take 0.4 mg by mouth once daily.    vitamin D 1000 units Tab Take 1,000 Units by mouth once daily.     Family History     Problem Relation (Age of Onset)    Heart attack Father    Heart disease Father        Social History Main Topics    Smoking status: Former Smoker     Types: Cigarettes     Quit date: 10/11/1977    Smokeless tobacco: Not on file    Alcohol use Yes      Comment: social    Drug use: No    Sexual activity: Not Currently     Review of Systems   Constitution: Negative.   HENT: Negative.    Eyes: Negative.    Cardiovascular: Positive for palpitations. Negative for chest pain, claudication, cyanosis, dyspnea on exertion, irregular heartbeat, leg swelling, near-syncope, orthopnea, paroxysmal nocturnal dyspnea and syncope.   Respiratory: Positive for shortness of breath. Negative for cough, hemoptysis, sleep disturbances due to breathing, snoring, sputum production and wheezing.    Endocrine: Negative.    Hematologic/Lymphatic: Negative.    Skin: Negative.    Musculoskeletal: Negative.    Gastrointestinal:  Negative.    Genitourinary: Negative.    Neurological: Negative.    Psychiatric/Behavioral: Negative.    Allergic/Immunologic: Negative.      Objective:     Vital Signs (Most Recent):  Temp: 97.6 °F (36.4 °C) (05/02/17 1400)  Pulse: (!) 120 (05/02/17 1503)  Resp: 16 (05/02/17 1400)  BP: (!) 168/70 (05/02/17 1449)  SpO2: 96 % (05/02/17 1400) Vital Signs (24h Range):  Temp:  [97.4 °F (36.3 °C)-97.6 °F (36.4 °C)] 97.6 °F (36.4 °C)  Pulse:  [] 120  Resp:  [15-21] 16  SpO2:  [94 %-98 %] 96 %  BP: (153-195)/(53-93) 168/70     Weight: 82.6 kg (182 lb 1.6 oz)  Body mass index is 26.89 kg/(m^2).    SpO2: 96 %  O2 Device (Oxygen Therapy): room air    No intake or output data in the 24 hours ending 05/02/17 1505    Lines/Drains/Airways     Peripheral Intravenous Line                 Peripheral IV - Single Lumen 05/02/17 0928 Left Hand less than 1 day                Physical Exam   Constitutional: He is oriented to person, place, and time. He appears well-developed and well-nourished.   HENT:   Head: Normocephalic and atraumatic.   Right Ear: External ear normal.   Left Ear: External ear normal.   Nose: Nose normal.   Mouth/Throat: Oropharynx is clear and moist.   Eyes: Conjunctivae and EOM are normal. Pupils are equal, round, and reactive to light.   Neck: Normal range of motion. Neck supple.   Cardiovascular: Normal rate, regular rhythm and intact distal pulses.    Murmur (3/6 KIRBY heard over AV area) heard.  Musculoskeletal: Normal range of motion.   Neurological: He is alert and oriented to person, place, and time. He has normal reflexes.   Skin: Skin is warm and dry.   Psychiatric: He has a normal mood and affect. His behavior is normal. Judgment and thought content normal.       Significant Labs:   BMP:   Recent Labs  Lab 05/02/17  0932   *      K 3.2*      CO2 28   BUN 16   CREATININE 1.1   CALCIUM 8.8   MG 2.0   , CMP   Recent Labs  Lab 05/02/17  0932      K 3.2*      CO2 28   GLU  118*   BUN 16   CREATININE 1.1   CALCIUM 8.8   PROT 7.0   ALBUMIN 3.6   BILITOT 0.6   ALKPHOS 80   AST 17   ALT 17   ANIONGAP 10   ESTGFRAFRICA >60   EGFRNONAA 59*   , CBC   Recent Labs  Lab 05/02/17  0932   WBC 4.46   HGB 12.5*   HCT 37.5*      , INR   Recent Labs  Lab 05/02/17  0932   INR 1.0   , Lipid Panel No results for input(s): CHOL, HDL, LDLCALC, TRIG, CHOLHDL in the last 48 hours., Troponin   Recent Labs  Lab 05/02/17  0933   TROPONINI 0.011   , All pertinent lab results from the last 24 hours have been reviewed. and   Recent Lab Results       05/02/17  0933 05/02/17  0932      Albumin  3.6     Alkaline Phosphatase  80     ALT  17     Anion Gap  10     aPTT  22.4  Comment:  aPTT therapeutic range = 39-69 seconds     AST  17     Baso #  0.03     Basophil%  0.7     Total Bilirubin  0.6  Comment:  For infants and newborns, interpretation of results should be based  on gestational age, weight and in agreement with clinical  observations.  Premature Infant recommended reference ranges:  Up to 24 hours.............<8.0 mg/dL  Up to 48 hours............<12.0 mg/dL  3-5 days..................<15.0 mg/dL  6-29 days.................<15.0 mg/dL       BNP  113  Comment:  Values of less than 100 pg/ml are consistent with non-CHF populations.(H)     BUN, Bld  16     Calcium  8.8     Chloride  105     CO2  28     CPK  295(H)       295(H)     CPK MB  4.4     Creatinine  1.1     D-Dimer  1.12  Comment:  The quantitative D-dimer assay should be used as an aid in   the diagnosis of deep vein thrombosis and pulmonary embolism  in patients with the appropriate presentation and clinical  history. Causes of a positive (>0.50 mg/L FEU) D-Dimer test  include, but are not limited to: DVT, PE, DIC, thrombolytic   therapy, anticoagulant therapy, recent surgery, trauma, or   pregnancy, disseminated malignancy, aortic aneurysm, cirrhosis,  and severe infection. False negative results may occur in   patients with distal DVT.  (H)   "   Differential Method  Automated     eGFR if   >60     eGFR if non   59  Comment:  Calculation used to obtain the estimated glomerular filtration  rate (eGFR) is the CKD-EPI equation. Since race is unknown   in our information system, the eGFR values for   -American and Non--American patients are given   for each creatinine result.  (A)     Eos #  0.1     Eosinophil%  2.5     Glucose  118(H)     Gran #  2.6     Gran%  57.6     Hematocrit  37.5(L)     Hemoglobin  12.5(L)     Coumadin Monitoring INR  1.0  Comment:  Coumadin Therapy:  2.0 - 3.0 for INR for all indicators except mechanical heart valves  and antiphospholipid syndromes which should use 2.5 - 3.5.       Lymph #  1.2     Lymph%  26.9     Magnesium  2.0     MB%  1.5  Comment:  To be positive, the MB% must be greater than 5% AND the CK-MB  greater than 6.5 ng/mL. Values not in the reference interval,   but not qualifying as positive, should be considered "trace".       MCH  31.2(H)     MCHC  33.3     MCV  94     Mono #  0.6     Mono%  12.3     MPV  9.7     Phosphorus  2.6(L)     Platelets  214     Potassium  3.2(L)     Total Protein  7.0     Protime  9.9     RBC  4.01(L)     RDW  14.1     Sodium  143     Troponin I 0.011  Comment:  The reference interval for Troponin I represents the 99th percentile   cutoff   for our facility and is consistent with 3rd generation assay   performance.        TSH 3.204      WBC  4.46           Significant Imaging: CT scan: CT ABDOMEN PELVIS WITH CONTRAST: No results found for this visit on 05/02/17. and CT ABDOMEN PELVIS WITHOUT CONTRAST: No results found for this visit on 05/02/17., Echocardiogram:   2D echo with color flow doppler:   Results for orders placed or performed in visit on 04/20/17   2D echo with color flow doppler   Result Value Ref Range    EF 60 55 - 65    Diastolic Dysfunction Yes (A)     Aortic Valve Regurgitation MILD TO MODERATE (A)     Aortic Valve Stenosis " MILD (A)     Est. PA Systolic Pressure 35.95     Mitral Valve Mobility NORMAL     Tricuspid Valve Regurgitation MILD     and X-Ray: CXR: X-Ray Chest 1 View (CXR):   Results for orders placed or performed during the hospital encounter of 05/02/17   X-Ray Chest 1 View    Narrative    Chest, 1 view: The heart is normal in size.  Calcified atheromatous disease affects the aorta.  There is congestion of the pulmonary vascularity.  No consolidation.  There may be small bilateral pleural effusions.  Age-appropriate degenerative changes affect the skeleton.    Impression     As above.      Electronically signed by: Michelle Evans MD  Date:     05/02/17  Time:    10:03     and X-Ray Chest PA and Lateral (CXR): No results found for this visit on 05/02/17. and KUB: X-Ray Abdomen AP 1 View (KUB): No results found for this visit on 05/02/17.

## 2017-05-02 NOTE — IP AVS SNAPSHOT
[address] [address]  Phone: [phone]           Patient Discharge Instructions   Our goal is to set you up for success. This packet includes information on your condition, medications, and your home care.  It will help you care for yourself to prevent having to return to the hospital.     Please ask your nurse if you have any questions.      There are many details to remember when preparing to leave the hospital. Here is what you will need to do:    1. Take your medicine. If you are prescribed medications, review your Medication List on the following pages. You may have new medications to  at the pharmacy and others that you'll need to stop taking. Review the instructions for how and when to take your medications. Talk with your doctor or nurses if you are unsure of what to do.     2. Go to your follow-up appointments. Specific follow-up information is listed in the following pages. Your may be contacted by a nurse or clinical provider about future appointments. Be sure we have all of the phone numbers to reach you. Please contact your provider's office if you are unable to make an appointment.     3. Watch for warning signs. Your doctor or nurse will give you detailed warning signs to watch for and when to call for assistance. These instructions may also include educational information about your condition. If you experience any of warning signs to your health, call your doctor.               ** Verify the list of medication(s) below is accurate and up to date. Carry this with you in case of emergency. If your medications have changed, please notify your healthcare provider.             Medication List      START taking these medications        Additional Info                      amiodarone 200 MG Tab   Commonly known as:  PACERONE   Quantity:  60 tablet   Refills:  0   Dose:  200 mg    Last time this was given:  400 mg on 5/4/2017  8:41 AM   Instructions:   Take 1 tablet (200 mg total) by mouth 2 (two) times daily.     Begin Date    AM    Noon    PM    Bedtime       apixaban 2.5 mg Tab   Quantity:  60 tablet   Refills:  0   Dose:  2.5 mg    Last time this was given:  2.5 mg on 5/4/2017  8:42 AM   Instructions:  Take 1 tablet (2.5 mg total) by mouth 2 (two) times daily.     Begin Date    AM    Noon    PM    Bedtime       pindolol 10 MG tablet   Commonly known as:  VISKEN   Quantity:  60 tablet   Refills:  0   Dose:  10 mg    Last time this was given:  10 mg on 5/4/2017  8:42 AM   Instructions:  Take 1 tablet (10 mg total) by mouth 2 (two) times daily.     Begin Date    AM    Noon    PM    Bedtime         CONTINUE taking these medications        Additional Info                      albuterol 2.5 mg /3 mL (0.083 %) nebulizer solution   Commonly known as:  PROVENTIL   Refills:  0   Dose:  2.5 mg    Instructions:  Take 2.5 mg by nebulization 4 (four) times daily as needed.     Begin Date    AM    Noon    PM    Bedtime       amlodipine 10 MG tablet   Commonly known as:  NORVASC   Quantity:  90 tablet   Refills:  3    Last time this was given:  10 mg on 5/4/2017  8:40 AM   Instructions:  TAKE 1 TABLET ONE TIME DAILY     Begin Date    AM    Noon    PM    Bedtime       aspirin 81 MG EC tablet   Commonly known as:  ECOTRIN   Refills:  0   Dose:  81 mg    Last time this was given:  81 mg on 5/3/2017 10:41 AM   Instructions:  Take 81 mg by mouth once daily.     Begin Date    AM    Noon    PM    Bedtime       atorvastatin 40 MG tablet   Commonly known as:  LIPITOR   Refills:  0   Dose:  40 mg    Last time this was given:  40 mg on 5/3/2017  8:21 PM   Instructions:  Take 40 mg by mouth once daily.     Begin Date    AM    Noon    PM    Bedtime       CINNAMON 500 mg capsule   Refills:  0   Dose:  500 mg   Generic drug:  cinnamon bark    Instructions:  Take 500 mg by mouth once daily.     Begin Date    AM    Noon    PM    Bedtime       cyclobenzaprine 10 MG tablet   Commonly known as:   FLEXERIL   Refills:  0   Dose:  10 mg    Instructions:  Take 10 mg by mouth 3 (three) times daily as needed.     Begin Date    AM    Noon    PM    Bedtime       fish oil-omega-3 fatty acids 300-1,000 mg capsule   Refills:  0   Dose:  2 g    Instructions:  Take 2 g by mouth once daily.     Begin Date    AM    Noon    PM    Bedtime       FLAXSEED OIL ORAL   Refills:  0   Dose:  1 tablet    Instructions:  Take 1 tablet by mouth once daily.     Begin Date    AM    Noon    PM    Bedtime       FLOMAX 0.4 mg Cp24   Refills:  0   Dose:  0.4 mg   Generic drug:  tamsulosin    Last time this was given:  0.4 mg on 5/3/2017  8:22 PM   Instructions:  Take 0.4 mg by mouth once daily.     Begin Date    AM    Noon    PM    Bedtime       folic acid 1 MG tablet   Commonly known as:  FOLVITE   Refills:  0   Dose:  1 mg    Instructions:  Take 1 mg by mouth once daily.     Begin Date    AM    Noon    PM    Bedtime       furosemide 40 MG tablet   Commonly known as:  LASIX   Quantity:  90 tablet   Refills:  3    Last time this was given:  40 mg on 5/4/2017  8:42 AM   Instructions:  TAKE 1 TABLET ONE TIME DAILY     Begin Date    AM    Noon    PM    Bedtime       garlic 1,000 mg Cap   Refills:  0   Dose:  1 tablet    Instructions:  Take 1 tablet by mouth 2 (two) times daily.     Begin Date    AM    Noon    PM    Bedtime       glimepiride 4 MG tablet   Commonly known as:  AMARYL   Refills:  0   Dose:  4 mg    Last time this was given:  4 mg on 5/4/2017  8:46 AM   Instructions:  Take 4 mg by mouth daily with breakfast. Pt taking one pill twice a day.     Begin Date    AM    Noon    PM    Bedtime       ipratropium 0.02 % nebulizer solution   Commonly known as:  ATROVENT   Refills:  0   Dose:  500 mcg    Instructions:  Take 500 mcg by nebulization 4 (four) times daily. PRN     Begin Date    AM    Noon    PM    Bedtime       lisinopril 10 MG tablet   Quantity:  90 tablet   Refills:  3   Dose:  10 mg    Last time this was given:  10 mg on  5/4/2017  8:40 AM   Instructions:  Take 1 tablet (10 mg total) by mouth once daily.     Begin Date    AM    Noon    PM    Bedtime       loratadine 10 mg Cap   Refills:  0   Dose:  1 tablet    Instructions:  Take 1 tablet by mouth once daily.     Begin Date    AM    Noon    PM    Bedtime       metformin 500 MG tablet   Commonly known as:  GLUCOPHAGE   Refills:  0   Dose:  500 mg    Last time this was given:  500 mg on 5/4/2017  8:45 AM   Instructions:  Take 500 mg by mouth daily with breakfast.     Begin Date    AM    Noon    PM    Bedtime       mometasone 50 mcg/actuation nasal spray   Commonly known as:  NASONEX   Refills:  0   Dose:  2 spray    Instructions:  2 sprays by Nasal route daily as needed.     Begin Date    AM    Noon    PM    Bedtime       nitroGLYCERIN 0.4 MG SL tablet   Commonly known as:  NITROSTAT   Refills:  0   Dose:  0.4 mg    Instructions:  Place 0.4 mg under the tongue every 5 (five) minutes as needed (PRN).     Begin Date    AM    Noon    PM    Bedtime       potassium chloride 10 MEQ Cpsr   Commonly known as:  MICRO-K   Quantity:  90 capsule   Refills:  3   Dose:  10 mEq    Instructions:  Take 1 capsule (10 mEq total) by mouth once daily.     Begin Date    AM    Noon    PM    Bedtime       ranitidine 150 MG tablet   Commonly known as:  ZANTAC   Refills:  0   Dose:  150 mg    Instructions:  Take 150 mg by mouth 2 (two) times daily.     Begin Date    AM    Noon    PM    Bedtime       vitamin D 1000 units Tab   Refills:  0   Dose:  1000 Units    Instructions:  Take 1,000 Units by mouth once daily.     Begin Date    AM    Noon    PM    Bedtime         STOP taking these medications     cilostazol 100 MG Tab   Commonly known as:  PLETAL       labetalol 200 MG tablet   Commonly known as:  NORMODYNE            Where to Get Your Medications      These medications were sent to [facility] - [address] [facility] [id number] [place] [id number] [id number]     Phone:  [phone]     amiodarone  200 MG Tab    apixaban 2.5 mg Tab    pindolol 10 MG tablet                  Please bring to all follow up appointments:    1. A copy of your discharge instructions.  2. All medicines you are currently taking in their original bottles.  3. Identification and insurance card.    Please arrive 15 minutes ahead of scheduled appointment time.    Please call 24 hours in advance if you must reschedule your appointment and/or time.        Your Scheduled Appointments     May 10, 2017  9:10 AM CDT   Non-Fasting Lab with LAB, [facility] ([facility])    [address] [facility] [id number] [phone]              Follow-up Information     Follow up with Lynne Buckner MD. Go in 1 day.    Specialty:  Cardiology    Why:  Outpatient Services    Contact information:    [address]  [address]  [place] [address]  [phone]          Follow up with Ascencion Cedeño MD In 1 week.    Specialty:  Internal Medicine    Contact information:    [address]  [place] [address]  [phone]          Discharge Instructions     Future Orders    Activity as tolerated     Diet Diabetic 1800 Calories         Discharge Instructions         Discharge Instructions for Atrial Fibrillation  You have been diagnosed with an abnormal heart rhythm called atrial fibrillation. With this condition, your hearts 2 upper chambers quiver rather than squeeze the blood out in a normal pattern. This leads to an irregular and sometimes rapid heartbeat. Some people will develop associated symptoms such as a flip-flopping heartbeat, chest pain, lightheadedness, or shortness of breath. Other people may have no symptoms at all. Atrial fibrillation is serious because it affects the hearts ability to fill with blood as it should. Blood clots may form. This increases the risk for stroke. Untreated atrial fibrillation can also lead to heart failure. Atrial fibrillation can be controlled.  With treatment, most people with atrial fibrillation lead normal lives.  Treatment options  Recommended treatment for atrial fibrillation depends on your age, symptoms, how long you have had atrial fibrillation, and other factors. You will have a complete evaluation to find out if you have any abnormalities that caused your heart to go into atrial fibrillation. This might be blocked heart arteries or a thyroid problem. Your doctor will assess your particular case and discuss choices with you.  Treatment choices may include:  · Treating an underlying disorder that puts you at risk for atrial fibrillation. For example, correcting an abnormal thyroid or electrolyte problem, or treating a blocked heart artery.  · Restoring a normal heart rhythm with an electrical shock (cardioversion) or with an antiarrhythmic medicine (chemical cardioversion)  · Using medicine to control your heart rate in atrial fibrillation.  · Preventing the risk for blood clot and stroke using blood-thinning medicines. Your doctor will tell you what he or she recommends. Choices may include aspirin, clopidogrel, warfarin, dabigatran, rivaroxaban, apixaban, and edoxaban.  · Doing catheter ablation or a surgical maze procedure. These use different methods to destroy certain areas of heart tissue. This interrupts the electrical signals causing atrial fibrillation. One of these procedures may be a choice when medicines do not work, or as an alternative to long-term medicine.  · Other treatment choices may be recommended for you by your doctor.  Managing risk factors for stroke and preventing heart failure are important parts of any treatment plan for atrial fibrillation.  Home care  · Take your medicines exactly as directed. Dont skip doses.  · Work with your doctor to find the right medicines and doses for you.  · Learn to take your own pulse. Keep a record of your results. Ask your doctor which pulse rates mean that you need medical attention.  Slowing your pulse is often the goal of treatment. Ask your doctor if its OK for you to use an automatic machine to check your pulse at home. Sometimes these machines dont count the pulse correctly when you have atrial fibrillation.  · Limit your intake of coffee, tea, cola, and other beverages with caffeine. Talk with your doctor about whether you should eliminate caffeine.  · Avoid over-the-counter medicines that have caffeine in them.  · Let your doctor know what medicines you take, including prescription and over-the-counter medicines, as well as any supplements. They interfere with some medicines given for atrial fibrillation.  · Ask your doctor about whether you can drink alcohol. Some people need to avoid alcohol to better treat atrial fibrillation. If you are taking blood-thinner medicines, alcohol may interfere with them by increasing their effect.  · Never take stimulants such as amphetamines or cocaine. These drugs can speed up your heart rate and trigger atrial fibrillation.  Follow-up care  Follow up with your doctor, or as advised.     When should I call my healthcare provider  Call your healthcare provider right away if you have any of the following:  · Weakness  · Dizziness  · Fainting  · Fatigue  · Shortness of breath  · Chest pain with increased activity  · A change in the usual regularity of your heartbeat, or an unusually fast heartbeat   Date Last Reviewed: 4/23/2016  © 5281-7804 [employer]. [address] [place] [address], [place] [address]. All rights reserved. This information is not intended as a substitute for professional medical care. Always follow your healthcare professional's instructions.        Understanding Heart Palpitations    Heart palpitations are a symptom. Its the feeling you have when your heartbeat seems to be racing, pounding, skipping, or fluttering. Heart palpitations are most often felt in the chest. Sometimes, they may also be felt in the neck.  What causes  heart palpitations?  In most cases, heart palpitations are caused by:  · Stress or anxiety  · Exercise  · Pregnancy  · Some medicines  · Caffeine  · Nicotine  · Alcohol  · Illegal drugs, such as cocaine  · Health problems, such as anemia or overactive thyroid  In some cases, heart palpitations may be caused by a problem with the heart. Abnormal heart rhythms (arrhythmias) are the main concern. They may need to be managed by you and your healthcare provider or treated right away.  How are heart palpitations treated?  Treatments for heart palpitations depend on the cause. Options may include:  · Managing the things that trigger your heart palpitations. This could mean:  ¨ Learning ways to reduce stress and anxiety  ¨ Avoiding caffeine, nicotine, alcohol, or illegal drugs  ¨ Stopping the use of certain medicines, under your doctors guidance  · Medicines, procedures, or surgery to treat an arrhythmia or other health problem that is causing your symptoms  What are the complications of heart palpitations?  Complications of heart palpitations are rare unless they are caused by a problem such as an arrhythmia. In such cases, complications can include:  · Fainting  · Heart failure. This problem occurs when the heart is so weak it no longer pumps blood well.  · Blood clots and stroke  · Sudden cardiac arrest. This problem occurs when the heart suddenly stops beating.  When should I call my healthcare provider?  Call your healthcare provider right away if you have any of these:  · Fever of 100.4°F (38°C) or higher, or as directed  · Symptoms that dont get better with treatment, or symptoms that get worse  · New symptoms, such as chest pain, shortness of breath, dizziness, or fainting   Date Last Reviewed: 5/1/2016 © 2000-2016 [employer]. [address] [place] [address], [place] [address]. All rights reserved. This information is not intended as a substitute for professional medical care. Always follow your healthcare  "professional's instructions.            Primary Diagnosis     Your primary diagnosis was:  Irregular Heartbeat      Admission Information     Date & Time Provider Department CSN    5/2/2017  9:15 AM Issac Hobson MD [facility] [id number]      Care Providers     Provider Role Specialty Primary office phone    Issac Hobson MD Attending Provider Internal Medicine [phone]    Prieto Dennis MD Consulting Physician  Cardiology [phone]      Important Medicare Message          Most Recent Value    Important Message from Medicare Regarding Discharge Appeal Rights  Given to patient/caregiver, Explained to patient/caregiver, Signed/date by patient/caregiver yes 05/03/2017 1133      Your Vitals Were     BP Pulse Temp Resp Height Weight    168/76 (BP Location: Left arm, Patient Position: Lying, BP Method: Manual) 56 97.1 °F (36.2 °C) (Oral) 16 5' 9" (1.753 m) 82.6 kg (182 lb 1.6 oz)    SpO2 BMI             97% 26.89 kg/m2         Recent Lab Values        9/27/2010 8/14/2015 2/23/2016 9/14/2016 4/5/2017               9:50 AM  9:55 AM  9:07 AM  9:50 AM  9:38 AM       A1C 7.1 (H) 7.5 (H) 7.5 (H) 7.3 (H) 7.0 (H)       Comment for A1C at  9:50 AM on 9/14/2016:  According to ADA guidelines, hemoglobin A1C <7.0% represents  optimal control in non-pregnant diabetic patients.  Different  metrics may apply to specific populations.   Standards of Medical Care in Diabetes - 2016.  For the purpose of screening for the presence of diabetes:  <5.7%     Consistent with the absence of diabetes  5.7-6.4%  Consistent with increasing risk for diabetes   (prediabetes)  >or=6.5%  Consistent with diabetes  Currently no consensus exists for use of hemoglobin A1C  for diagnosis of diabetes for children.      Comment for A1C at  9:38 AM on 4/5/2017:  According to ADA guidelines, hemoglobin A1C <7.0% represents  optimal control in non-pregnant diabetic patients.  Different  metrics may apply to specific populations. "   Standards of Medical Care in Diabetes - 2016.  For the purpose of screening for the presence of diabetes:  <5.7%     Consistent with the absence of diabetes  5.7-6.4%  Consistent with increasing risk for diabetes   (prediabetes)  >or=6.5%  Consistent with diabetes  Currently no consensus exists for use of hemoglobin A1C  for diagnosis of diabetes for children.        Allergies as of 5/4/2017        Reactions    Iodinated Contrast Media - Oral And Iv Dye       [facility]     [facility] Nurse Care Line - 24/7 Assistance  Unless otherwise directed by your provider, please contact [facility], our nurse care line that is available for 24/7 assistance.     Registered nurses in the [facility] provide clinical advisement, health education, appointment booking, and other advisory services.  Call for this free service at [phone].        Advance Directives     An advance directive is a document which, in the event you are no longer able to make decisions for yourself, tells your healthcare team what kind of treatment you do or do not want to receive, or who you would like to make those decisions for you.  If you do not currently have an advance directive, [facility] encourages you to create one.  For more information call:  [phone]), [phone] ([phone]),  or log on to [email or web address].        Language Assistance Services     ATTENTION: Language assistance services are available, free of charge. Please call [phone].      ATENCIÓN: Si habla español, tiene a elder disposición servicios gratuitos de asistencia lingüística. Llame al [phone].     CHÚ Ý: N?u b?n nói Ti?ng Vi?t, có các d?ch v? h? tr? ngôn ng? mi?n phí dành cho b?n. G?i s? [phone].        Chronic Kindey Disease Education             Diabetes Discharge Instructions                                   [facility] complies with applicable  Federal civil rights laws and does not discriminate on the basis of race, color, national origin, age, disability, or sex.

## 2017-05-02 NOTE — TELEPHONE ENCOUNTER
----- Message from Reina Bryson sent at 5/1/2017  4:32 PM CDT -----  Contact: pt's daughter  Pt's daughter José Miguel is calling to let you know that her Father refuses to go to the ER.  He will go in the morning when the sitter comes in to sit for his wife.    Thanks

## 2017-05-02 NOTE — ASSESSMENT & PLAN NOTE
89 yrs old is allergic to iodinated contrast; on metformin; CKD   All make me nervous about CTA chest   Will do VQ scan   Compression device for dvt prophlaxis

## 2017-05-02 NOTE — ASSESSMENT & PLAN NOTE
Dr conte is not convinced l  Will try pindolol ;he thinks atrial tachycardia is likely   DC labetolol    Troponin's  Telemetry

## 2017-05-02 NOTE — CONSULTS
[facility]  Cardiology  Consult Note    Patient Name: Aleksandr Schultz  MRN: [id number]  Admission Date: 5/2/2017  Hospital Length of Stay: 0 days  Code Status: Full Code   Attending Provider: Issac Hobson MD   Consulting Provider: Malik Sesay NP  Primary Care Physician: Ascencion Cedeño MD  Principal Problem:<principal problem not specified>    Patient information was obtained from patient, past medical records and ER records.     Consults  Subjective:     Chief Complaint:  palpitations     HPI:   89 year old male with a history of first degree AVB, CAD, HTN,  Dyslipidemia, DM2, BPH, CKD3B, mild AS, presents to ER with c/o palpitations which began approximately 1 week ago and have been increasing in frequency. Patient denies CP, orthopnea, PND but does endore increased dependent edema and SOB. Patient exhibiting no signs of fluid volume overload on exam. EKG reveals ST with first degree AVB and T wave inversion in leads 2,3,AVF which is new finding since 2012. Initial CE negative.      Past Medical History:   Diagnosis Date    Aortic aneurysm     Asthma, chronic     Chronic kidney disease     CKD (chronic kidney disease)     COPD (chronic obstructive pulmonary disease)     Coronary artery disease     Diabetes mellitus     Glaucoma (increased eye pressure)     Hypertension     Peripheral vascular disease     Prostate cancer     Sleep apnea     cpap    Small bowel obstruction     Status post balloon angioplasty of pulmonary artery branches     1980    Stroke     1991       Past Surgical History:   Procedure Laterality Date    BACK SURGERY      x2 lumbar    CAROTID ARTERY ANGIOPLASTY      HERNIA REPAIR      PI OU      laser PI OU    SHOULDER SURGERY      rotator bilateral       Review of patient's allergies indicates:   Allergen Reactions    Iodinated contrast media - oral and iv dye        No current facility-administered medications on file prior to encounter.      Current  Outpatient Prescriptions on File Prior to Encounter   Medication Sig    albuterol (PROVENTIL) 2.5 mg /3 mL (0.083 %) nebulizer solution Take 2.5 mg by nebulization 4 (four) times daily as needed.     amlodipine (NORVASC) 10 MG tablet TAKE 1 TABLET ONE TIME DAILY    aspirin (ECOTRIN) 81 MG EC tablet Take 81 mg by mouth once daily.      atorvastatin (LIPITOR) 40 MG tablet Take 40 mg by mouth once daily.    cilostazol (PLETAL) 100 MG Tab Take 100 mg by mouth once daily.     cinnamon bark (CINNAMON) 500 mg capsule Take 500 mg by mouth once daily.    cyclobenzaprine (FLEXERIL) 10 MG tablet Take 10 mg by mouth 3 (three) times daily as needed.      fish oil-omega-3 fatty acids 300-1,000 mg capsule Take 2 g by mouth once daily.      FLAXSEED OIL ORAL Take 1 tablet by mouth once daily.     folic acid (FOLVITE) 1 MG tablet Take 1 mg by mouth once daily.    furosemide (LASIX) 40 MG tablet TAKE 1 TABLET ONE TIME DAILY    garlic 1,000 mg Cap Take 1 tablet by mouth 2 (two) times daily.     glimepiride (AMARYL) 4 MG tablet Take 4 mg by mouth daily with breakfast. Pt taking one pill twice a day.    ipratropium (ATROVENT) 0.02 % nebulizer solution Take 500 mcg by nebulization 4 (four) times daily. PRN    labetalol (NORMODYNE) 200 MG tablet TAKE 2 TABLETS TWICE DAILY    lisinopril 10 MG tablet Take 1 tablet (10 mg total) by mouth once daily.    loratadine 10 mg Cap Take 1 tablet by mouth once daily.     metformin (GLUCOPHAGE) 500 MG tablet Take 500 mg by mouth daily with breakfast.    mometasone (NASONEX) 50 mcg/actuation nasal spray 2 sprays by Nasal route daily as needed.     nitroGLYCERIN (NITROSTAT) 0.4 MG SL tablet Place 0.4 mg under the tongue every 5 (five) minutes as needed (PRN).     potassium chloride (MICRO-K) 10 MEQ CpSR Take 1 capsule (10 mEq total) by mouth once daily.    ranitidine (ZANTAC) 150 MG tablet Take 150 mg by mouth 2 (two) times daily.      tamsulosin (FLOMAX) 0.4 mg Cp24 Take 0.4 mg by  mouth once daily.    vitamin D 1000 units Tab Take 1,000 Units by mouth once daily.     Family History     Problem Relation (Age of Onset)    Heart attack Father    Heart disease Father        Social History Main Topics    Smoking status: Former Smoker     Types: Cigarettes     Quit date: 10/11/1977    Smokeless tobacco: Not on file    Alcohol use Yes      Comment: social    Drug use: No    Sexual activity: Not Currently     Review of Systems   Constitution: Negative.   HENT: Negative.    Eyes: Negative.    Cardiovascular: Positive for palpitations. Negative for chest pain, claudication, cyanosis, dyspnea on exertion, irregular heartbeat, leg swelling, near-syncope, orthopnea, paroxysmal nocturnal dyspnea and syncope.   Respiratory: Positive for shortness of breath. Negative for cough, hemoptysis, sleep disturbances due to breathing, snoring, sputum production and wheezing.    Endocrine: Negative.    Hematologic/Lymphatic: Negative.    Skin: Negative.    Musculoskeletal: Negative.    Gastrointestinal: Negative.    Genitourinary: Negative.    Neurological: Negative.    Psychiatric/Behavioral: Negative.    Allergic/Immunologic: Negative.      Objective:     Vital Signs (Most Recent):  Temp: 97.6 °F (36.4 °C) (05/02/17 1400)  Pulse: (!) 120 (05/02/17 1503)  Resp: 16 (05/02/17 1400)  BP: (!) 168/70 (05/02/17 1449)  SpO2: 96 % (05/02/17 1400) Vital Signs (24h Range):  Temp:  [97.4 °F (36.3 °C)-97.6 °F (36.4 °C)] 97.6 °F (36.4 °C)  Pulse:  [] 120  Resp:  [15-21] 16  SpO2:  [94 %-98 %] 96 %  BP: (153-195)/(53-93) 168/70     Weight: 82.6 kg (182 lb 1.6 oz)  Body mass index is 26.89 kg/(m^2).    SpO2: 96 %  O2 Device (Oxygen Therapy): room air    No intake or output data in the 24 hours ending 05/02/17 1505    Lines/Drains/Airways     Peripheral Intravenous Line                 Peripheral IV - Single Lumen 05/02/17 0928 Left Hand less than 1 day                Physical Exam   Constitutional: He is oriented to  person, place, and time. He appears well-developed and well-nourished.   HENT:   Head: Normocephalic and atraumatic.   Right Ear: External ear normal.   Left Ear: External ear normal.   Nose: Nose normal.   Mouth/Throat: Oropharynx is clear and moist.   Eyes: Conjunctivae and EOM are normal. Pupils are equal, round, and reactive to light.   Neck: Normal range of motion. Neck supple.   Cardiovascular: Normal rate, regular rhythm and intact distal pulses.    Murmur (3/6 KIRBY heard over AV area) heard.  Musculoskeletal: Normal range of motion.   Neurological: He is alert and oriented to person, place, and time. He has normal reflexes.   Skin: Skin is warm and dry.   Psychiatric: He has a normal mood and affect. His behavior is normal. Judgment and thought content normal.       Significant Labs:   BMP:   Recent Labs  Lab 05/02/17  0932   *      K 3.2*      CO2 28   BUN 16   CREATININE 1.1   CALCIUM 8.8   MG 2.0   , CMP   Recent Labs  Lab 05/02/17  0932      K 3.2*      CO2 28   *   BUN 16   CREATININE 1.1   CALCIUM 8.8   PROT 7.0   ALBUMIN 3.6   BILITOT 0.6   ALKPHOS 80   AST 17   ALT 17   ANIONGAP 10   ESTGFRAFRICA >60   EGFRNONAA 59*   , CBC   Recent Labs  Lab 05/02/17  0932   WBC 4.46   HGB 12.5*   HCT 37.5*      , INR   Recent Labs  Lab 05/02/17  0932   INR 1.0   , Lipid Panel No results for input(s): CHOL, HDL, LDLCALC, TRIG, CHOLHDL in the last 48 hours., Troponin   Recent Labs  Lab 05/02/17  0933   TROPONINI 0.011   , All pertinent lab results from the last 24 hours have been reviewed. and   Recent Lab Results       05/02/17  0933 05/02/17  0932      Albumin  3.6     Alkaline Phosphatase  80     ALT  17     Anion Gap  10     aPTT  22.4  Comment:  aPTT therapeutic range = 39-69 seconds     AST  17     Baso #  0.03     Basophil%  0.7     Total Bilirubin  0.6  Comment:  For infants and newborns, interpretation of results should be based  on gestational age, weight and in  agreement with clinical  observations.  Premature Infant recommended reference ranges:  Up to 24 hours.............<8.0 mg/dL  Up to 48 hours............<12.0 mg/dL  3-5 days..................<15.0 mg/dL  6-29 days.................<15.0 mg/dL       BNP  113  Comment:  Values of less than 100 pg/ml are consistent with non-CHF populations.(H)     BUN, Bld  16     Calcium  8.8     Chloride  105     CO2  28     CPK  295(H)       295(H)     CPK MB  4.4     Creatinine  1.1     D-Dimer  1.12  Comment:  The quantitative D-dimer assay should be used as an aid in   the diagnosis of deep vein thrombosis and pulmonary embolism  in patients with the appropriate presentation and clinical  history. Causes of a positive (>0.50 mg/L FEU) D-Dimer test  include, but are not limited to: DVT, PE, DIC, thrombolytic   therapy, anticoagulant therapy, recent surgery, trauma, or   pregnancy, disseminated malignancy, aortic aneurysm, cirrhosis,  and severe infection. False negative results may occur in   patients with distal DVT.  (H)     Differential Method  Automated     eGFR if   >60     eGFR if non   59  Comment:  Calculation used to obtain the estimated glomerular filtration  rate (eGFR) is the CKD-EPI equation. Since race is unknown   in our information system, the eGFR values for   -American and Non--American patients are given   for each creatinine result.  (A)     Eos #  0.1     Eosinophil%  2.5     Glucose  118(H)     Gran #  2.6     Gran%  57.6     Hematocrit  37.5(L)     Hemoglobin  12.5(L)     Coumadin Monitoring INR  1.0  Comment:  Coumadin Therapy:  2.0 - 3.0 for INR for all indicators except mechanical heart valves  and antiphospholipid syndromes which should use 2.5 - 3.5.       Lymph #  1.2     Lymph%  26.9     Magnesium  2.0     MB%  1.5  Comment:  To be positive, the MB% must be greater than 5% AND the CK-MB  greater than 6.5 ng/mL. Values not in the reference interval,   but  "not qualifying as positive, should be considered "trace".       MCH  31.2(H)     MCHC  33.3     MCV  94     Mono #  0.6     Mono%  12.3     MPV  9.7     Phosphorus  2.6(L)     Platelets  214     Potassium  3.2(L)     Total Protein  7.0     Protime  9.9     RBC  4.01(L)     RDW  14.1     Sodium  143     Troponin I 0.011  Comment:  The reference interval for Troponin I represents the 99th percentile   cutoff   for our facility and is consistent with 3rd generation assay   performance.        TSH 3.204      WBC  4.46           Significant Imaging: CT scan: CT ABDOMEN PELVIS WITH CONTRAST: No results found for this visit on 05/02/17. and CT ABDOMEN PELVIS WITHOUT CONTRAST: No results found for this visit on 05/02/17., Echocardiogram:   2D echo with color flow doppler:   Results for orders placed or performed in visit on 04/20/17   2D echo with color flow doppler   Result Value Ref Range    EF 60 55 - 65    Diastolic Dysfunction Yes (A)     Aortic Valve Regurgitation MILD TO MODERATE (A)     Aortic Valve Stenosis MILD (A)     Est. PA Systolic Pressure 35.95     Mitral Valve Mobility NORMAL     Tricuspid Valve Regurgitation MILD     and X-Ray: CXR: X-Ray Chest 1 View (CXR):   Results for orders placed or performed during the hospital encounter of 05/02/17   X-Ray Chest 1 View    Narrative    Chest, 1 view: The heart is normal in size.  Calcified atheromatous disease affects the aorta.  There is congestion of the pulmonary vascularity.  No consolidation.  There may be small bilateral pleural effusions.  Age-appropriate degenerative changes affect the skeleton.    Impression     As above.      Electronically signed by: Michelle Evans MD  Date:     05/02/17  Time:    10:03     and X-Ray Chest PA and Lateral (CXR): No results found for this visit on 05/02/17. and KUB: X-Ray Abdomen AP 1 View (KUB): No results found for this visit on 05/02/17.    Assessment and Plan:     No new Assessment & Plan notes have been filed under this " hospital service since the last note was generated.  Service: Cardiology      VTE Risk Mitigation         Ordered     Medium Risk of VTE  Once      05/02/17 1358     Place sequential compression device  Until discontinued      05/02/17 1359        Current Facility-Administered Medications   Medication    acetaminophen tablet 650 mg    amlodipine tablet 10 mg    aspirin EC tablet 81 mg    atorvastatin tablet 40 mg    cilostazol tablet 100 mg    fluticasone 50 mcg/actuation nasal spray 1 spray    furosemide tablet 40 mg    [START ON 5/3/2017] glimepiride tablet 4 mg    labetalol tablet 400 mg    lisinopril tablet 10 mg    [START ON 5/3/2017] metformin tablet 500 mg    ondansetron injection 4 mg    pantoprazole EC tablet 40 mg    promethazine (PHENERGAN) 12.5 mg in dextrose 5 % 50 mL IVPB    tamsulosin 24 hr capsule 0.4 mg      Dobutamine stress echo 12/2012: Normal    Echo 4/2017: LVEF 60%, stage 1 LV diastolic dysfunction, mild AS, mild to moderate AR, mild TR    Palpitations for 5 days; runs of apparent recurrent SVT rate 120 bpm probably related to low K  Frequent PACs /some SB  CAD with PTCA 1998  AS/AR mild, EF 60% 4/17  HTN  DM2  Dyslipidemia  PAD/AAA  BPH  Carotid artery disease  Hyperparathyroidism  COPD/elevated DDimer  Advanced age    Plan switch labetalol to pindolol  Correct K  Check Mag  Consider r/o PE      Thank you for your consult. I will follow-up with patient. Please contact us if you have any additional questions.    Malik Sesay NP  Cardiology   [facility]    I attest that I have personally seen and examined this patient. I have reviewed and discussed the management in detail as outlined above.

## 2017-05-03 PROBLEM — I48.0 PAROXYSMAL ATRIAL FIBRILLATION: Status: ACTIVE | Noted: 2017-05-02

## 2017-05-03 LAB
ALBUMIN SERPL BCP-MCNC: 3.2 G/DL
ALP SERPL-CCNC: 82 U/L
ALT SERPL W/O P-5'-P-CCNC: 17 U/L
ANION GAP SERPL CALC-SCNC: 10 MMOL/L
AST SERPL-CCNC: 14 U/L
BASOPHILS # BLD AUTO: 0.02 K/UL
BASOPHILS NFR BLD: 0.4 %
BILIRUB SERPL-MCNC: 0.4 MG/DL
BUN SERPL-MCNC: 14 MG/DL
CALCIUM SERPL-MCNC: 8.8 MG/DL
CHLORIDE SERPL-SCNC: 110 MMOL/L
CO2 SERPL-SCNC: 24 MMOL/L
CREAT SERPL-MCNC: 0.9 MG/DL
DIFFERENTIAL METHOD: ABNORMAL
EOSINOPHIL # BLD AUTO: 0.2 K/UL
EOSINOPHIL NFR BLD: 3.4 %
ERYTHROCYTE [DISTWIDTH] IN BLOOD BY AUTOMATED COUNT: 13.9 %
EST. GFR  (AFRICAN AMERICAN): >60 ML/MIN/1.73 M^2
EST. GFR  (NON AFRICAN AMERICAN): >60 ML/MIN/1.73 M^2
GLUCOSE SERPL-MCNC: 127 MG/DL
HCT VFR BLD AUTO: 35.9 %
HGB BLD-MCNC: 12.1 G/DL
LYMPHOCYTES # BLD AUTO: 1.5 K/UL
LYMPHOCYTES NFR BLD: 32.4 %
MAGNESIUM SERPL-MCNC: 2 MG/DL
MCH RBC QN AUTO: 31.5 PG
MCHC RBC AUTO-ENTMCNC: 33.7 %
MCV RBC AUTO: 94 FL
MONOCYTES # BLD AUTO: 0.6 K/UL
MONOCYTES NFR BLD: 12 %
NEUTROPHILS # BLD AUTO: 2.5 K/UL
NEUTROPHILS NFR BLD: 51.8 %
PLATELET # BLD AUTO: 202 K/UL
PMV BLD AUTO: 9.5 FL
POCT GLUCOSE: 119 MG/DL (ref 70–110)
POCT GLUCOSE: 141 MG/DL (ref 70–110)
POCT GLUCOSE: 161 MG/DL (ref 70–110)
POCT GLUCOSE: 81 MG/DL (ref 70–110)
POTASSIUM SERPL-SCNC: 3.6 MMOL/L
PROT SERPL-MCNC: 6.4 G/DL
RBC # BLD AUTO: 3.84 M/UL
SODIUM SERPL-SCNC: 144 MMOL/L
TROPONIN I SERPL DL<=0.01 NG/ML-MCNC: 0.01 NG/ML
WBC # BLD AUTO: 4.76 K/UL

## 2017-05-03 PROCEDURE — 94761 N-INVAS EAR/PLS OXIMETRY MLT: CPT

## 2017-05-03 PROCEDURE — 25000003 PHARM REV CODE 250: Performed by: NURSE PRACTITIONER

## 2017-05-03 PROCEDURE — 25000242 PHARM REV CODE 250 ALT 637 W/ HCPCS: Performed by: INTERNAL MEDICINE

## 2017-05-03 PROCEDURE — 99900035 HC TECH TIME PER 15 MIN (STAT)

## 2017-05-03 PROCEDURE — 27000339 *HC DAILY SUPPLY KIT

## 2017-05-03 PROCEDURE — 82962 GLUCOSE BLOOD TEST: CPT

## 2017-05-03 PROCEDURE — 99214 OFFICE O/P EST MOD 30 MIN: CPT | Mod: ,,, | Performed by: FAMILY MEDICINE

## 2017-05-03 PROCEDURE — 93005 ELECTROCARDIOGRAM TRACING: CPT

## 2017-05-03 PROCEDURE — 85025 COMPLETE CBC W/AUTO DIFF WBC: CPT

## 2017-05-03 PROCEDURE — 83735 ASSAY OF MAGNESIUM: CPT

## 2017-05-03 PROCEDURE — 25000003 PHARM REV CODE 250: Performed by: SURGERY

## 2017-05-03 PROCEDURE — 94640 AIRWAY INHALATION TREATMENT: CPT

## 2017-05-03 PROCEDURE — 36415 COLL VENOUS BLD VENIPUNCTURE: CPT

## 2017-05-03 PROCEDURE — 80053 COMPREHEN METABOLIC PANEL: CPT

## 2017-05-03 PROCEDURE — 25000003 PHARM REV CODE 250: Performed by: INTERNAL MEDICINE

## 2017-05-03 PROCEDURE — 27200120 HC KIT IV START (RUSH ONLY)

## 2017-05-03 PROCEDURE — 93010 ELECTROCARDIOGRAM REPORT: CPT | Mod: ,,, | Performed by: INTERNAL MEDICINE

## 2017-05-03 PROCEDURE — G0378 HOSPITAL OBSERVATION PER HR: HCPCS

## 2017-05-03 PROCEDURE — 84484 ASSAY OF TROPONIN QUANT: CPT

## 2017-05-03 RX ORDER — AMIODARONE HYDROCHLORIDE 200 MG/1
200 TABLET ORAL DAILY
Status: DISCONTINUED | OUTPATIENT
Start: 2017-05-10 | End: 2017-05-04 | Stop reason: HOSPADM

## 2017-05-03 RX ORDER — AMIODARONE HYDROCHLORIDE 200 MG/1
400 TABLET ORAL 2 TIMES DAILY
Status: DISCONTINUED | OUTPATIENT
Start: 2017-05-03 | End: 2017-05-04

## 2017-05-03 RX ORDER — IPRATROPIUM BROMIDE AND ALBUTEROL SULFATE 2.5; .5 MG/3ML; MG/3ML
3 SOLUTION RESPIRATORY (INHALATION) EVERY 6 HOURS PRN
Status: DISCONTINUED | OUTPATIENT
Start: 2017-05-03 | End: 2017-05-04 | Stop reason: HOSPADM

## 2017-05-03 RX ADMIN — AMIODARONE HYDROCHLORIDE 400 MG: 200 TABLET ORAL at 10:05

## 2017-05-03 RX ADMIN — IPRATROPIUM BROMIDE AND ALBUTEROL SULFATE 3 ML: .5; 3 SOLUTION RESPIRATORY (INHALATION) at 07:05

## 2017-05-03 RX ADMIN — ATORVASTATIN CALCIUM 40 MG: 40 TABLET, FILM COATED ORAL at 08:05

## 2017-05-03 RX ADMIN — APIXABAN 2.5 MG: 2.5 TABLET, FILM COATED ORAL at 08:05

## 2017-05-03 RX ADMIN — APIXABAN 2.5 MG: 2.5 TABLET, FILM COATED ORAL at 01:05

## 2017-05-03 RX ADMIN — FLUTICASONE PROPIONATE 1 SPRAY: 50 SPRAY, METERED NASAL at 10:05

## 2017-05-03 RX ADMIN — LISINOPRIL 10 MG: 10 TABLET ORAL at 10:05

## 2017-05-03 RX ADMIN — PINDOLOL 10 MG: 10 TABLET ORAL at 10:05

## 2017-05-03 RX ADMIN — METFORMIN HYDROCHLORIDE 500 MG: 500 TABLET ORAL at 10:05

## 2017-05-03 RX ADMIN — ASPIRIN 81 MG: 81 TABLET, COATED ORAL at 10:05

## 2017-05-03 RX ADMIN — PANTOPRAZOLE SODIUM 40 MG: 40 TABLET, DELAYED RELEASE ORAL at 10:05

## 2017-05-03 RX ADMIN — PINDOLOL 10 MG: 10 TABLET ORAL at 08:05

## 2017-05-03 RX ADMIN — FUROSEMIDE 40 MG: 40 TABLET ORAL at 10:05

## 2017-05-03 RX ADMIN — GLIMEPIRIDE 4 MG: 1 TABLET ORAL at 10:05

## 2017-05-03 RX ADMIN — TAMSULOSIN HYDROCHLORIDE 0.4 MG: 0.4 CAPSULE ORAL at 08:05

## 2017-05-03 RX ADMIN — AMIODARONE HYDROCHLORIDE 400 MG: 200 TABLET ORAL at 08:05

## 2017-05-03 RX ADMIN — CLONIDINE HYDROCHLORIDE 0.1 MG: 0.1 TABLET ORAL at 11:05

## 2017-05-03 RX ADMIN — AMLODIPINE BESYLATE 10 MG: 10 TABLET ORAL at 10:05

## 2017-05-03 NOTE — PROGRESS NOTES
[facility]  Cardiology  Progress Note    Patient Name: Aleksandr Schultz  MRN: [id number]  Admission Date: 5/2/2017  Hospital Length of Stay: 0 days  Code Status: Full Code   Attending Physician: Issac Hobson MD   Primary Care Physician: Ascencion Cedeño MD  Expected Discharge Date:   Principal Problem:Sinus noemy-tachy syndrome    Subjective:     Chief Complaint:  palpitations      HPI:   89 year old male with a history of first degree AVB, CAD, HTN, Dyslipidemia, DM2, BPH, CKD3B, mild AS, presents to ER with c/o palpitations which began approximately 1 week ago and have been increasing in frequency. Patient denies CP, orthopnea, PND but does endore increased dependent edema and SOB. Patient exhibiting no signs of fluid volume overload on exam. EKG reveals ST with first degree AVB and T wave inversion in leads 2,3,AVF which is new finding since 2012. Initial CE negative.     ROS   Constitution: Negative.   HENT: Negative.   Eyes: Negative.   Cardiovascular: Positive for palpitations. Negative for chest pain, claudication, cyanosis, dyspnea on exertion, irregular heartbeat, leg swelling, near-syncope, orthopnea, paroxysmal nocturnal dyspnea and syncope.   Respiratory: Positive for shortness of breath. Negative for cough, hemoptysis, sleep disturbances due to breathing, snoring, sputum production and wheezing.   Endocrine: Negative.   Hematologic/Lymphatic: Negative.   Skin: Negative.   Musculoskeletal: Negative.   Gastrointestinal: Negative.   Genitourinary: Negative.   Neurological: Negative.   Psychiatric/Behavioral: Negative.   Allergic/Immunologic: Negative.       Objective:     Vital Signs (Most Recent):  Temp: 96.8 °F (36 °C) (05/03/17 0400)  Pulse: 74 (05/03/17 0755)  Resp: 16 (05/03/17 0755)  BP: 135/60 (05/03/17 0400)  SpO2: (!) 94 % (05/03/17 0755) Vital Signs (24h Range):  Temp:  [96.8 °F (36 °C)-97.9 °F (36.6 °C)] 96.8 °F (36 °C)  Pulse:  [] 74  Resp:  [15-21] 16  SpO2:  [92 %-98  %] 94 %  BP: (132-195)/(53-93) 135/60     Weight: 82.6 kg (182 lb 1.6 oz)  Body mass index is 26.89 kg/(m^2).    SpO2: (!) 94 %  O2 Device (Oxygen Therapy): room air      Intake/Output Summary (Last 24 hours) at 05/03/17 0800  Last data filed at 05/02/17 1800   Gross per 24 hour   Intake              240 ml   Output                0 ml   Net              240 ml       Lines/Drains/Airways     Peripheral Intravenous Line                 Peripheral IV - Single Lumen 05/02/17 0928 Left Hand less than 1 day                Physical Exam   Constitutional: He is oriented to person, place, and time. He appears well-developed and well-nourished.   HENT:   Head: Normocephalic and atraumatic.   Right Ear: External ear normal.   Left Ear: External ear normal.   Nose: Nose normal.   Mouth/Throat: Oropharynx is clear and moist.   Eyes: Conjunctivae and EOM are normal. Pupils are equal, round, and reactive to light.   Neck: Normal range of motion. Neck supple.   Cardiovascular: Normal rate, regular rhythm and intact distal pulses.   Murmur (3/6 KIRBY heard over AV area) heard.  Musculoskeletal: Normal range of motion.   Neurological: He is alert and oriented to person, place, and time. He has normal reflexes.   Skin: Skin is warm and dry.   Psychiatric: He has a normal mood and affect. His behavior is normal. Judgment and thought content normal.     Significant Labs:   BMP:   Recent Labs  Lab 05/02/17  0932 05/02/17  1734 05/03/17  0318   *  --  127*     --  144   K 3.2*  --  3.6     --  110   CO2 28  --  24   BUN 16  --  14   CREATININE 1.1  --  0.9   CALCIUM 8.8  --  8.8   MG 2.0 2.2 2.0   , CMP   Recent Labs  Lab 05/02/17  0932 05/03/17  0318    144   K 3.2* 3.6    110   CO2 28 24   * 127*   BUN 16 14   CREATININE 1.1 0.9   CALCIUM 8.8 8.8   PROT 7.0 6.4   ALBUMIN 3.6 3.2*   BILITOT 0.6 0.4   ALKPHOS 80 82   AST 17 14   ALT 17 17   ANIONGAP 10 10   ESTGFRAFRICA >60 >60   EGFRNONAA 59* >60   ,  CBC   Recent Labs  Lab 05/02/17  0932 05/03/17  0318   WBC 4.46 4.76   HGB 12.5* 12.1*   HCT 37.5* 35.9*    202   , INR   Recent Labs  Lab 05/02/17  0932   INR 1.0   , Lipid Panel No results for input(s): CHOL, HDL, LDLCALC, TRIG, CHOLHDL in the last 48 hours., Troponin   Recent Labs  Lab 05/02/17  1534 05/02/17  2124 05/03/17  0318   TROPONINI 0.006 0.011 0.011   , All pertinent lab results from the last 24 hours have been reviewed. and   Recent Lab Results       05/03/17  0614 05/03/17  0318 05/02/17 2124 05/02/17  2041 05/02/17  1734      Albumin  3.2(L)        Alkaline Phosphatase  82        ALT  17        Anion Gap  10        aPTT          AST  14        Baso #  0.02        Basophil%  0.4        Total Bilirubin  0.4  Comment:  For infants and newborns, interpretation of results should be based  on gestational age, weight and in agreement with clinical  observations.  Premature Infant recommended reference ranges:  Up to 24 hours.............<8.0 mg/dL  Up to 48 hours............<12.0 mg/dL  3-5 days..................<15.0 mg/dL  6-29 days.................<15.0 mg/dL          BNP          BUN, Bld  14        Calcium  8.8        Chloride  110        CO2  24        CPK          CPK MB          Creatinine  0.9        D-Dimer          Differential Method  Automated        eGFR if   >60        eGFR if non   >60  Comment:  Calculation used to obtain the estimated glomerular filtration  rate (eGFR) is the CKD-EPI equation. Since race is unknown   in our information system, the eGFR values for   -American and Non--American patients are given   for each creatinine result.          Eos #  0.2        Eosinophil%  3.4        Glucose  127(H)        Gran #  2.5        Gran%  51.8        Hematocrit  35.9(L)        Hemoglobin  12.1(L)        Coumadin Monitoring INR          Lymph #  1.5        Lymph%  32.4        Magnesium  2.0   2.2     MB%          MCH  31.5(H)        MCHC   33.7        MCV  94        Mono #  0.6        Mono%  12.0        MPV  9.5        Phosphorus          Platelets  202        POCT Glucose 141(H)   129(H)      Potassium  3.6        Total Protein  6.4        Protime          RBC  3.84(L)        RDW  13.9        Sodium  144        Troponin I  0.011  Comment:  The reference interval for Troponin I represents the 99th percentile   cutoff   for our facility and is consistent with 3rd generation assay   performance.   0.011  Comment:  The reference interval for Troponin I represents the 99th percentile   cutoff   for our facility and is consistent with 3rd generation assay   performance.         TSH          WBC  4.76                    05/02/17  1534 05/02/17  0933 05/02/17  0932      Albumin   3.6     Alkaline Phosphatase   80     ALT   17     Anion Gap   10     aPTT   22.4  Comment:  aPTT therapeutic range = 39-69 seconds     AST   17     Baso #   0.03     Basophil%   0.7     Total Bilirubin   0.6  Comment:  For infants and newborns, interpretation of results should be based  on gestational age, weight and in agreement with clinical  observations.  Premature Infant recommended reference ranges:  Up to 24 hours.............<8.0 mg/dL  Up to 48 hours............<12.0 mg/dL  3-5 days..................<15.0 mg/dL  6-29 days.................<15.0 mg/dL       BNP   113  Comment:  Values of less than 100 pg/ml are consistent with non-CHF populations.(H)     BUN, Bld   16     Calcium   8.8     Chloride   105     CO2   28     CPK   295(H)        295(H)     CPK MB   4.4     Creatinine   1.1     D-Dimer   1.12  Comment:  The quantitative D-dimer assay should be used as an aid in   the diagnosis of deep vein thrombosis and pulmonary embolism  in patients with the appropriate presentation and clinical  history. Causes of a positive (>0.50 mg/L FEU) D-Dimer test  include, but are not limited to: DVT, PE, DIC, thrombolytic   therapy, anticoagulant therapy, recent surgery, trauma, or  "  pregnancy, disseminated malignancy, aortic aneurysm, cirrhosis,  and severe infection. False negative results may occur in   patients with distal DVT.  (H)     Differential Method   Automated     eGFR if    >60     eGFR if non    59  Comment:  Calculation used to obtain the estimated glomerular filtration  rate (eGFR) is the CKD-EPI equation. Since race is unknown   in our information system, the eGFR values for   -American and Non--American patients are given   for each creatinine result.  (A)     Eos #   0.1     Eosinophil%   2.5     Glucose   118(H)     Gran #   2.6     Gran%   57.6     Hematocrit   37.5(L)     Hemoglobin   12.5(L)     Coumadin Monitoring INR   1.0  Comment:  Coumadin Therapy:  2.0 - 3.0 for INR for all indicators except mechanical heart valves  and antiphospholipid syndromes which should use 2.5 - 3.5.       Lymph #   1.2     Lymph%   26.9     Magnesium   2.0     MB%   1.5  Comment:  To be positive, the MB% must be greater than 5% AND the CK-MB  greater than 6.5 ng/mL. Values not in the reference interval,   but not qualifying as positive, should be considered "trace".       MCH   31.2(H)     MCHC   33.3     MCV   94     Mono #   0.6     Mono%   12.3     MPV   9.7     Phosphorus   2.6(L)     Platelets   214     POCT Glucose        Potassium   3.2(L)     Total Protein   7.0     Protime   9.9     RBC   4.01(L)     RDW   14.1     Sodium   143     Troponin I 0.006  Comment:  The reference interval for Troponin I represents the 99th percentile   cutoff   for our facility and is consistent with 3rd generation assay   performance.   0.011  Comment:  The reference interval for Troponin I represents the 99th percentile   cutoff   for our facility and is consistent with 3rd generation assay   performance.        TSH  3.204      WBC   4.46           Significant Imaging: CT scan: CT ABDOMEN PELVIS WITH CONTRAST: No results found for this visit on 05/02/17. and " CT ABDOMEN PELVIS WITHOUT CONTRAST: No results found for this visit on 05/02/17., Echocardiogram:   2D echo with color flow doppler:   Results for orders placed or performed in visit on 04/20/17   2D echo with color flow doppler   Result Value Ref Range    EF 60 55 - 65    Diastolic Dysfunction Yes (A)     Aortic Valve Regurgitation MILD TO MODERATE (A)     Aortic Valve Stenosis MILD (A)     Est. PA Systolic Pressure 35.95     Mitral Valve Mobility NORMAL     Tricuspid Valve Regurgitation MILD     and X-Ray: CXR: X-Ray Chest 1 View (CXR):   Results for orders placed or performed during the hospital encounter of 05/02/17   X-Ray Chest 1 View    Narrative    Chest, 1 view: The heart is normal in size.  Calcified atheromatous disease affects the aorta.  There is congestion of the pulmonary vascularity.  No consolidation.  There may be small bilateral pleural effusions.  Age-appropriate degenerative changes affect the skeleton.    Impression     As above.      Electronically signed by: Michelle Evans MD  Date:     05/02/17  Time:    10:03     and X-Ray Chest PA and Lateral (CXR): No results found for this visit on 05/02/17. and KUB: X-Ray Abdomen AP 1 View (KUB): No results found for this visit on 05/02/17.  Assessment and Plan:         Active Diagnoses:    Diagnosis Date Noted POA    PRINCIPAL PROBLEM:  Sinus noemy-tachy syndrome [I49.5] 05/02/2017 Yes    Palpitations [R00.2] 05/02/2017 Yes    Positive D dimer [R79.89] 05/02/2017 Yes    Aortic valve disorder [I35.9] 02/11/2015 Yes     Chronic    PAD (peripheral artery disease) [I73.9] 12/12/2012 Yes      Problems Resolved During this Admission:    Diagnosis Date Noted Date Resolved POA       VTE Risk Mitigation         Ordered     Place sequential compression device  Until discontinued      05/02/17 1651     Medium Risk of VTE  Once      05/02/17 1358     Place sequential compression device  Until discontinued      05/02/17 1359        Current Facility-Administered  Medications   Medication    acetaminophen tablet 650 mg    albuterol-ipratropium 2.5mg-0.5mg/3mL nebulizer solution 3 mL    amlodipine tablet 10 mg    aspirin EC tablet 81 mg    atorvastatin tablet 40 mg    cilostazol tablet 100 mg    cloNIDine tablet 0.1 mg    dextrose 50% injection 12.5 g    dextrose 50% injection 25 g    fluticasone 50 mcg/actuation nasal spray 1 spray    furosemide tablet 40 mg    glimepiride tablet 4 mg    glucagon (human recombinant) injection 1 mg    glucose chewable tablet 16 g    glucose chewable tablet 24 g    insulin aspart pen 0-5 Units    lisinopril tablet 10 mg    metformin tablet 500 mg    ondansetron injection 4 mg    pantoprazole EC tablet 40 mg    pindolol tablet 10 mg    promethazine (PHENERGAN) 12.5 mg in dextrose 5 % 50 mL IVPB    tamsulosin 24 hr capsule 0.4 mg     Dobutamine stress echo 12/2012: Normal     Echo 4/2017: LVEF 60%, stage 1 LV diastolic dysfunction, mild AS, mild to moderate AR, mild TR       Went into AF last night,rate controlled, asymptomatic  Palpitations for 5 days; runs of apparent recurrent SVT rate 120 bpm probably related to low K  Frequent PACs /some SB  CAD with PTCA 1998  AS/AR mild, EF 60% 4/17  HTN  DM2  Dyslipidemia  PAD/AAA  BPH  Carotid artery disease  Hyperparathyroidism  COPD/elevated DDimer  Advanced age     Plan switched labetalol to pindolol with no further tachy or bradycardia  Anticoagulate  Check AAA  Try amiodarone  Corrected K  Check Mag  Consider r/o PE  protonix  Dc asa and cilostazol        Thank you for your consult. I will follow-up with patient. Please contact us if you have any additional questions.     Transcribed by Malik Sesay NP for Dr MARGOT Dennis  Cardiology   [facility]     I attest that I have personally seen and examined this patient. I have reviewed and discussed the management in detail as outlined above.

## 2017-05-03 NOTE — PLAN OF CARE
Problem: Patient Care Overview  Goal: Plan of Care Review  Outcome: Ongoing (interventions implemented as appropriate)  BPs elevated this AM as well as some bradycardia, see flowsheet. BP controled with PO meds. Pt was also in afib but converted to NSR around 10 am. Ambulating, eating, and voiding well. Added amiodarone and apixaban. abd aorta US done. Daughter at the bedside, very attentive. Discussed plan of care with pt and daughter, stated understanding

## 2017-05-03 NOTE — ASSESSMENT & PLAN NOTE
Discussed with Dr conte   Will change labetolol to pindolol  Continue telemetry /holter  A fib noted now on amiodarone and eliquis

## 2017-05-03 NOTE — PLAN OF CARE
05/03/17 1134   Discharge Assessment   Assessment Type Discharge Planning Assessment   Confirmed/corrected address and phone number on facesheet? Yes   Assessment information obtained from? Patient;Other  (Daughter José Miguel)   Expected Length of Stay (days) 2   Communicated expected length of stay with patient/caregiver yes   Type of Healthcare Directive Received Advance Directive   If Healthcare Directive is received, is it scanned into Epic? no (comment)  (Requested copy)   Prior to hospitilization cognitive status: Alert/Oriented   Prior to hospitalization functional status: Assistive Equipment   Current cognitive status: Alert/Oriented   Current Functional Status: Assistive Equipment   Arrived From home or self-care   Lives With spouse   Able to Return to Prior Arrangements yes   Is patient able to care for self after discharge? Yes   How many people do you have in your home that can help with your care after discharge? 0   Who are your caregiver(s) and their phone number(s)? José Miguel Vivas [phone], Gabby Hollingsworth [phone]   Patient's perception of discharge disposition home or selfcare   Readmission Within The Last 30 Days no previous admission in last 30 days   Patient currently being followed by outpatient case management? No   Patient currently receives home health services? No   Does the patient currently use HME? Yes   Name and contact number for HME provider: Unknown   Patient currently receives private duty nursing? No   Patient currently receives any other outside agency services? No   Equipment Currently Used at Home cane, quad;rollator;walker, rolling;CPAP   Do you have any problems affording any of your prescribed medications? No   Is the patient taking medications as prescribed? yes   Do you have any financial concerns preventing you from receiving the healthcare you need? No   Does the patient have transportation to healthcare appointments? Yes   Transportation Available family or  friend will provide   On Dialysis? No   Does the patient receive services at the Coumadin Clinic? No   Are there any open cases? No   Discharge Plan A Home with family   Discharge Plan B Home with family   Patient/Family In Agreement With Plan yes   Patient is a 89 year old male coming to the hospital from home where he lives with his spouse, Kelli.  Spouse is in final stages of Alzheimers and is on hospice.  Patient is main care given of his wife.  They live next door to there two daughters.  Daughters take patient to his appointments and give support when needed.  Patient was informed of the observation status and was given a signed copy of the [facility] form.  They have no questions or concerns for .  Patient anticipates returning home.  No anticipated discharge needs.

## 2017-05-03 NOTE — SUBJECTIVE & OBJECTIVE
Interval note: feeling well this am, no complaints. New onset a fib  Review of Systems   Constitutional: Negative for chills and fever.   HENT: Positive for hearing loss. Negative for congestion, postnasal drip and sore throat.    Eyes: Negative for photophobia.   Respiratory: Negative for chest tightness and shortness of breath.    Cardiovascular: Positive for leg swelling. Negative for chest pain.        Leg edema     Gastrointestinal: Negative for abdominal distention, abdominal pain, blood in stool and vomiting.   Genitourinary: Negative for dysuria, flank pain and hematuria.   Musculoskeletal: Positive for back pain, gait problem and myalgias.   Skin: Negative for pallor.   Neurological: Negative for dizziness, seizures, facial asymmetry, speech difficulty and numbness.   Hematological: Does not bruise/bleed easily.   Psychiatric/Behavioral: Negative for agitation and suicidal ideas. The patient is nervous/anxious.      Objective:     Vital Signs (Most Recent):  Temp: 96.2 °F (35.7 °C) (05/03/17 0743)  Pulse: 66 (05/03/17 0800)  Resp: 16 (05/03/17 0755)  BP: (!) 162/68 (05/03/17 0743)  SpO2: (!) 94 % (05/03/17 0755) Vital Signs (24h Range):  Temp:  [96.2 °F (35.7 °C)-97.9 °F (36.6 °C)] 96.2 °F (35.7 °C)  Pulse:  [] 66  Resp:  [15-20] 16  SpO2:  [92 %-98 %] 94 %  BP: (132-180)/(53-93) 162/68     Weight: 82.6 kg (182 lb 1.6 oz)  Body mass index is 26.89 kg/(m^2).    Physical Exam   Constitutional: He is oriented to person, place, and time. He appears well-developed and well-nourished.   HENT:   Head: Normocephalic and atraumatic.   Nose: Nose normal.   Mouth/Throat: Oropharynx is clear and moist.   Eyes: Conjunctivae and EOM are normal. Pupils are equal, round, and reactive to light.   Neck: Normal range of motion. Neck supple. No JVD present. No thyromegaly present.   Cardiovascular: Normal rate, regular rhythm, normal heart sounds and intact distal pulses.    Pulmonary/Chest: Effort normal and breath  sounds normal.   Abdominal: Soft. Bowel sounds are normal. He exhibits no distension and no mass. There is no tenderness. There is no guarding.   Musculoskeletal: Normal range of motion. He exhibits edema.   Lymphadenopathy:     He has no cervical adenopathy.   Neurological: He is alert and oriented to person, place, and time. He has normal reflexes. No cranial nerve deficit.   Skin: Skin is warm and dry. No rash noted. No pallor.   Psychiatric: He has a normal mood and affect.   Nursing note and vitals reviewed.       Significant Labs:   CBC:     Recent Labs  Lab 05/02/17  0932 05/03/17  0318   WBC 4.46 4.76   HGB 12.5* 12.1*   HCT 37.5* 35.9*    202     CMP:     Recent Labs  Lab 05/02/17  0932 05/03/17  0318    144   K 3.2* 3.6    110   CO2 28 24   * 127*   BUN 16 14   CREATININE 1.1 0.9   CALCIUM 8.8 8.8   PROT 7.0 6.4   ALBUMIN 3.6 3.2*   BILITOT 0.6 0.4   ALKPHOS 80 82   AST 17 14   ALT 17 17   ANIONGAP 10 10   EGFRNONAA 59* >60   Mag 2.0  Troponin:     Recent Labs  Lab 05/02/17  1534 05/02/17  2124 05/03/17  0318   TROPONINI 0.006 0.011 0.011     Lab Results   Component Value Date    DDIMER 1.12 (H) 05/02/2017     Lab Results   Component Value Date    INR 1.0 05/02/2017       TSH:     Recent Labs  Lab 05/02/17  0933   TSH 3.204     BNP    Recent Labs  Lab 05/02/17  0932   *     Significant Imaging:   Chest, 1 view: The heart is normal in size.  Calcified atheromatous disease affects the aorta.  There is congestion of the pulmonary vascularity.  No consolidation.  There may be small bilateral pleural effusions.  Age-appropriate degenerative changes affect the skeleton.    4/20 echo      Ref Range & Units 13d ago   4yr ago       EF 55 - 65 60 65R    Diastolic Dysfunction  Yes (A)     Aortic Valve Regurgitation  MILD TO MODERATE (A)     Aortic Valve Stenosis  MILD (A)     Est. PA Systolic Pressure  35.95     Mitral Valve Mobility  NORMAL     Tricuspid Valve Regurgitation  MILD       EKGs reviewed

## 2017-05-03 NOTE — PLAN OF CARE
Problem: Patient Care Overview  Goal: Plan of Care Review  Outcome: Ongoing (interventions implemented as appropriate)  Pt agrees with plan of care.  Pt in and out of Afib this shift. Rate controlled and VS stable.  No complaints of pain noted.  Daughter at bedside.  Continue to monitor glucose.  Call bell in reach.  Will continue to monitor.

## 2017-05-03 NOTE — PROGRESS NOTES
Patient with SB 1st degree block with PAC's now in/out of Afib rate controlled, vitals stable. RN notified, will continue to monitor.

## 2017-05-03 NOTE — NURSING
DR. Hobson and Elizabeth Diaz NP notified of pt in AFib.  Pt is rate controlled and VS stable.  Will continue to monitor.

## 2017-05-03 NOTE — PROGRESS NOTES
Patient to room via w/c with RN. Daughter at side. Hypertensive. HR 82. Denies pain or shortness of breath. Telemetry maintained. Patient and daughter oriented to room and plan of care discussed.

## 2017-05-03 NOTE — PLAN OF CARE
Problem: Patient Care Overview  Goal: Plan of Care Review  Outcome: Ongoing (interventions implemented as appropriate)  Denies pain. Troponin trending down. Repeat troponins throughout the night. Repeat labs in AM. Potassium replaced with 40mEq. Lung perfusion scan for AM. HR 50's-119. Denies any feelings of palpitations. Cardio consulted. Prn clonidine ordered. Up to bathroom with standby assist. Patient encouraged to decrease caffeine. Daughter at side. Patient and daughter understand plan of care and agree.

## 2017-05-04 ENCOUNTER — PATIENT MESSAGE (OUTPATIENT)
Dept: CARDIOLOGY | Facility: CLINIC | Age: 82
End: 2017-05-04

## 2017-05-04 VITALS
SYSTOLIC BLOOD PRESSURE: 168 MMHG | OXYGEN SATURATION: 97 % | HEIGHT: 69 IN | BODY MASS INDEX: 26.97 KG/M2 | RESPIRATION RATE: 16 BRPM | DIASTOLIC BLOOD PRESSURE: 76 MMHG | TEMPERATURE: 97 F | WEIGHT: 182.13 LBS | HEART RATE: 58 BPM

## 2017-05-04 PROBLEM — I49.9 ARRHYTHMIA: Status: RESOLVED | Noted: 2017-05-02 | Resolved: 2017-05-04

## 2017-05-04 LAB
MAGNESIUM SERPL-MCNC: 2.1 MG/DL
POCT GLUCOSE: 106 MG/DL (ref 70–110)
POCT GLUCOSE: 144 MG/DL (ref 70–110)

## 2017-05-04 PROCEDURE — 99217 PR OBSERVATION CARE DISCHARGE: CPT | Mod: ,,, | Performed by: FAMILY MEDICINE

## 2017-05-04 PROCEDURE — 25000003 PHARM REV CODE 250: Performed by: NURSE PRACTITIONER

## 2017-05-04 PROCEDURE — 27000339 *HC DAILY SUPPLY KIT

## 2017-05-04 PROCEDURE — 94640 AIRWAY INHALATION TREATMENT: CPT

## 2017-05-04 PROCEDURE — 25000003 PHARM REV CODE 250: Performed by: SURGERY

## 2017-05-04 PROCEDURE — G0378 HOSPITAL OBSERVATION PER HR: HCPCS

## 2017-05-04 PROCEDURE — 25000242 PHARM REV CODE 250 ALT 637 W/ HCPCS: Performed by: INTERNAL MEDICINE

## 2017-05-04 PROCEDURE — 82962 GLUCOSE BLOOD TEST: CPT

## 2017-05-04 PROCEDURE — 83735 ASSAY OF MAGNESIUM: CPT

## 2017-05-04 PROCEDURE — 36415 COLL VENOUS BLD VENIPUNCTURE: CPT

## 2017-05-04 PROCEDURE — 25000003 PHARM REV CODE 250: Performed by: INTERNAL MEDICINE

## 2017-05-04 RX ORDER — AMIODARONE HYDROCHLORIDE 200 MG/1
200 TABLET ORAL 2 TIMES DAILY
Qty: 60 TABLET | Refills: 0 | Status: SHIPPED | OUTPATIENT
Start: 2017-05-04 | End: 2017-06-26 | Stop reason: SDUPTHER

## 2017-05-04 RX ORDER — AMIODARONE HYDROCHLORIDE 200 MG/1
200 TABLET ORAL 2 TIMES DAILY
Status: DISCONTINUED | OUTPATIENT
Start: 2017-05-04 | End: 2017-05-04 | Stop reason: HOSPADM

## 2017-05-04 RX ORDER — PINDOLOL 10 MG/1
10 TABLET ORAL 2 TIMES DAILY
Qty: 60 TABLET | Refills: 0 | Status: SHIPPED | OUTPATIENT
Start: 2017-05-04 | End: 2018-07-25 | Stop reason: ALTCHOICE

## 2017-05-04 RX ADMIN — APIXABAN 2.5 MG: 2.5 TABLET, FILM COATED ORAL at 08:05

## 2017-05-04 RX ADMIN — FUROSEMIDE 40 MG: 40 TABLET ORAL at 08:05

## 2017-05-04 RX ADMIN — LISINOPRIL 10 MG: 10 TABLET ORAL at 08:05

## 2017-05-04 RX ADMIN — AMLODIPINE BESYLATE 10 MG: 10 TABLET ORAL at 08:05

## 2017-05-04 RX ADMIN — FLUTICASONE PROPIONATE 1 SPRAY: 50 SPRAY, METERED NASAL at 08:05

## 2017-05-04 RX ADMIN — METFORMIN HYDROCHLORIDE 500 MG: 500 TABLET ORAL at 08:05

## 2017-05-04 RX ADMIN — PINDOLOL 10 MG: 10 TABLET ORAL at 08:05

## 2017-05-04 RX ADMIN — IPRATROPIUM BROMIDE AND ALBUTEROL SULFATE 3 ML: .5; 3 SOLUTION RESPIRATORY (INHALATION) at 08:05

## 2017-05-04 RX ADMIN — PANTOPRAZOLE SODIUM 40 MG: 40 TABLET, DELAYED RELEASE ORAL at 08:05

## 2017-05-04 RX ADMIN — AMIODARONE HYDROCHLORIDE 400 MG: 200 TABLET ORAL at 08:05

## 2017-05-04 RX ADMIN — GLIMEPIRIDE 4 MG: 1 TABLET ORAL at 08:05

## 2017-05-04 NOTE — PROGRESS NOTES
[facility]  Cardiology  Progress Note    Patient Name: Aleksandr Schultz  MRN: [id number]  Admission Date: 5/2/2017  Hospital Length of Stay: 0 days  Code Status: Full Code   Attending Physician: Issac Hobson MD   Primary Care Physician: Ascencion Cedeño MD  Expected Discharge Date:   Principal Problem:Sinus noemy-tachy syndrome    Subjective:     Chief Complaint:  palpitations       HPI:   89 year old male with a history of first degree AVB, CAD, HTN, Dyslipidemia, DM2, BPH, CKD3B, mild AS, presents to ER with c/o palpitations which began approximately 1 week ago and have been increasing in frequency. Patient denies CP, orthopnea, PND but does endore increased dependent edema and SOB. Patient exhibiting no signs of fluid volume overload on exam. EKG reveals ST with first degree AVB and T wave inversion in leads 2,3,AVF which is new finding since 2012. Initial CE negative.     ROS   Constitution: Negative.   HENT: Negative.   Eyes: Negative.   Cardiovascular: Positive for palpitations. Negative for chest pain, claudication, cyanosis, dyspnea on exertion, irregular heartbeat, leg swelling, near-syncope, orthopnea, paroxysmal nocturnal dyspnea and syncope.   Respiratory: Positive for shortness of breath. Negative for cough, hemoptysis, sleep disturbances due to breathing, snoring, sputum production and wheezing.   Endocrine: Negative.   Hematologic/Lymphatic: Negative.   Skin: Negative.   Musculoskeletal: Negative.   Gastrointestinal: Negative.   Genitourinary: Negative.   Neurological: Negative.   Psychiatric/Behavioral: Negative.   Allergic/Immunologic: Negative.     Objective:     Vital Signs (Most Recent):  Temp: 97.1 °F (36.2 °C) (05/04/17 0719)  Pulse: 60 (05/04/17 0809)  Resp: 16 (05/04/17 0809)  BP: (!) 168/76 (05/04/17 0719)  SpO2: 97 % (05/04/17 0809) Vital Signs (24h Range):  Temp:  [96.5 °F (35.8 °C)-98.3 °F (36.8 °C)] 97.1 °F (36.2 °C)  Pulse:  [48-65] 60  Resp:  [16-18] 16  SpO2:  [92  %-97 %] 97 %  BP: (135-182)/(62-76) 168/76     Weight: 82.6 kg (182 lb 1.6 oz)  Body mass index is 26.89 kg/(m^2).    SpO2: 97 %  O2 Device (Oxygen Therapy): room air      Intake/Output Summary (Last 24 hours) at 05/04/17 0859  Last data filed at 05/03/17 2010   Gross per 24 hour   Intake              120 ml   Output                0 ml   Net              120 ml       Lines/Drains/Airways     Peripheral Intravenous Line                 Peripheral IV - Single Lumen 05/02/17 0928 Left Hand 1 day                Physical Exam   Constitutional: He is oriented to person, place, and time. He appears well-developed and well-nourished.   HENT:   Head: Normocephalic and atraumatic.   Right Ear: External ear normal.   Left Ear: External ear normal.   Nose: Nose normal.   Mouth/Throat: Oropharynx is clear and moist.   Eyes: Conjunctivae and EOM are normal. Pupils are equal, round, and reactive to light.   Neck: Normal range of motion. Neck supple.   Cardiovascular: Normal rate, regular rhythm and intact distal pulses.   Murmur (3/6 KIRBY heard over AV area) heard.  Musculoskeletal: Normal range of motion.   Neurological: He is alert and oriented to person, place, and time. He has normal reflexes.   Skin: Skin is warm and dry.   Psychiatric: He has a normal mood and affect. His behavior is normal. Judgment and thought content normal.     Significant Labs:   BMP:   Recent Labs  Lab 05/02/17  0932 05/02/17  1734 05/03/17 0318 05/04/17  0550   *  --  127*  --      --  144  --    K 3.2*  --  3.6  --      --  110  --    CO2 28  --  24  --    BUN 16  --  14  --    CREATININE 1.1  --  0.9  --    CALCIUM 8.8  --  8.8  --    MG 2.0 2.2 2.0 2.1   , CMP   Recent Labs  Lab 05/02/17  0932 05/03/17  0318    144   K 3.2* 3.6    110   CO2 28 24   * 127*   BUN 16 14   CREATININE 1.1 0.9   CALCIUM 8.8 8.8   PROT 7.0 6.4   ALBUMIN 3.6 3.2*   BILITOT 0.6 0.4   ALKPHOS 80 82   AST 17 14   ALT 17 17   ANIONGAP 10  10   ESTGFRAFRICA >60 >60   EGFRNONAA 59* >60   , CBC   Recent Labs  Lab 17  0932 17  0318   WBC 4.46 4.76   HGB 12.5* 12.1*   HCT 37.5* 35.9*    202   , INR   Recent Labs  Lab 17  0932   INR 1.0   , Troponin   Recent Labs  Lab 17  1534 17  2124 17  0318   TROPONINI 0.006 0.011 0.011   , All pertinent lab results from the last 24 hours have been reviewed. and   Recent Lab Results       17  0715 17  0550 17  2025 17  1659 17  1141      Magnesium  2.1        POCT Glucose 144(H)  161(H) 81 119(H)                     Significant ImaginD echo with color flow doppler:         Results for orders placed or performed in visit on 17   2D echo with color flow doppler   Result Value Ref Range     EF 60 55 - 65     Diastolic Dysfunction Yes (A)       Aortic Valve Regurgitation MILD TO MODERATE (A)       Aortic Valve Stenosis MILD (A)       Est. PA Systolic Pressure 35.95       Mitral Valve Mobility NORMAL       Tricuspid Valve Regurgitation MILD      and X-Ray: CXR: X-Ray Chest 1 View (CXR):       Results for orders placed or performed during the hospital encounter of 17   X-Ray Chest 1 View     Narrative     Chest, 1 view: The heart is normal in size. Calcified atheromatous disease affects the aorta. There is congestion of the pulmonary vascularity. No consolidation. There may be small bilateral pleural effusions. Age-appropriate degenerative changes affect the skeleton.     Impression     As above.        Electronically signed by: Michelle Evans MD  Date:     17  Time:    10:03        Assessment and Plan:     Current Facility-Administered Medications   Medication    acetaminophen tablet 650 mg    albuterol-ipratropium 2.5mg-0.5mg/3mL nebulizer solution 3 mL    [START ON 5/10/2017] amiodarone tablet 200 mg    amiodarone tablet 200 mg    amlodipine tablet 10 mg    apixaban tablet 2.5 mg    atorvastatin tablet 40 mg     cloNIDine tablet 0.1 mg    dextrose 50% injection 12.5 g    dextrose 50% injection 25 g    fluticasone 50 mcg/actuation nasal spray 1 spray    furosemide tablet 40 mg    glimepiride tablet 4 mg    glucagon (human recombinant) injection 1 mg    glucose chewable tablet 16 g    glucose chewable tablet 24 g    insulin aspart pen 0-5 Units    lisinopril tablet 10 mg    metformin tablet 500 mg    ondansetron injection 4 mg    pantoprazole EC tablet 40 mg    pindolol tablet 10 mg    promethazine (PHENERGAN) 12.5 mg in dextrose 5 % 50 mL IVPB    tamsulosin 24 hr capsule 0.4 mg       Active Diagnoses:    Diagnosis Date Noted POA    PRINCIPAL PROBLEM:  Sinus noemy-tachy syndrome [I49.5] 05/02/2017 Yes    Paroxysmal atrial fibrillation [I48.0] 05/02/2017 Yes    Positive D dimer [R79.89] 05/02/2017 Yes    Aortic valve disorder [I35.9] 02/11/2015 Yes     Chronic    PAD (peripheral artery disease) [I73.9] 12/12/2012 Yes      Problems Resolved During this Admission:    Diagnosis Date Noted Date Resolved POA       VTE Risk Mitigation         Ordered     apixaban tablet 2.5 mg  2 times daily     Route:  Oral        05/03/17 0902     Place sequential compression device  Until discontinued      05/02/17 1651     Medium Risk of VTE  Once      05/02/17 1358     Place sequential compression device  Until discontinued      05/02/17 1359          Dobutamine stress echo 12/2012: Normal      Echo 4/2017: LVEF 60%, stage 1 LV diastolic dysfunction, mild AS, mild to moderate AR, mild TR         Went into AF last night,rate controlled, asymptomatic; now back in SR  Palpitations for 5 days; runs of apparent recurrent SVT rate 120 bpm probably related to low K  Frequent PACs /some SB  CAD with PTCA 1998  AS/AR mild, EF 60% 4/17  HTN  DM2  Dyslipidemia  PAD/AAA  BPH  Carotid artery disease  Hyperparathyroidism  COPD/elevated DDimer  Advanced age      Plan switched labetalol to pindolol with no further tachy or  bradycardia  Anticoagulate  Check AAA  Reduce amiodarone  Corrected K  protonix  Dc asa and cilostazol          Thank you for your consult. I will follow-up with patient. Please contact us if you have any additional questions.      Transcribed by Malik Sesay NP for Dr MARGOT Dennis  Cardiology   [facility]      I attest that I have personally seen and examined this patient. I have reviewed and discussed the management in detail as outlined above.          Revision History

## 2017-05-04 NOTE — SUBJECTIVE & OBJECTIVE
Interval note: feeling well this am, no complaints.     Review of Systems   Constitutional: Negative for chills and fever.   HENT: Positive for hearing loss. Negative for congestion, postnasal drip and sore throat.    Eyes: Negative for photophobia.   Respiratory: Negative for chest tightness and shortness of breath.    Cardiovascular: Negative for chest pain and leg swelling.        Leg edema better     Gastrointestinal: Negative for abdominal distention, abdominal pain, blood in stool and vomiting.   Genitourinary: Negative for dysuria, flank pain and hematuria.   Musculoskeletal: Positive for back pain. Negative for gait problem and myalgias.   Skin: Negative for pallor.   Neurological: Negative for dizziness, seizures, facial asymmetry, speech difficulty and numbness.   Hematological: Does not bruise/bleed easily.   Psychiatric/Behavioral: Negative for agitation and suicidal ideas. The patient is not nervous/anxious.      Objective:     Vital Signs (Most Recent):  Temp: 97.1 °F (36.2 °C) (05/04/17 0719)  Pulse: (!) 56 (05/04/17 1000)  Resp: 16 (05/04/17 0809)  BP: (!) 168/76 (05/04/17 0719)  SpO2: 97 % (05/04/17 0809) Vital Signs (24h Range):  Temp:  [96.5 °F (35.8 °C)-98.3 °F (36.8 °C)] 97.1 °F (36.2 °C)  Pulse:  [48-65] 56  Resp:  [16-18] 16  SpO2:  [92 %-97 %] 97 %  BP: (135-182)/(62-76) 168/76     Weight: 82.6 kg (182 lb 1.6 oz)  Body mass index is 26.89 kg/(m^2).    Physical Exam   Constitutional: He is oriented to person, place, and time. He appears well-developed and well-nourished.   HENT:   Head: Normocephalic and atraumatic.   Nose: Nose normal.   Mouth/Throat: Oropharynx is clear and moist.   Eyes: Conjunctivae and EOM are normal. Pupils are equal, round, and reactive to light.   Neck: Normal range of motion. Neck supple. No JVD present. No thyromegaly present.   Cardiovascular: Normal rate, regular rhythm, normal heart sounds and intact distal pulses.    Pulmonary/Chest: Effort normal and breath  sounds normal.   Abdominal: Soft. Bowel sounds are normal. He exhibits no distension and no mass. There is no tenderness. There is no guarding.   Musculoskeletal: Normal range of motion. He exhibits edema.   Lymphadenopathy:     He has no cervical adenopathy.   Neurological: He is alert and oriented to person, place, and time. He has normal reflexes. No cranial nerve deficit.   Skin: Skin is warm and dry. No rash noted. No pallor.   Psychiatric: He has a normal mood and affect.   Nursing note and vitals reviewed.       Significant Labs:   CBC:     Recent Labs  Lab 05/03/17 0318   WBC 4.76   HGB 12.1*   HCT 35.9*        CMP:     Recent Labs  Lab 05/03/17 0318      K 3.6      CO2 24   *   BUN 14   CREATININE 0.9   CALCIUM 8.8   PROT 6.4   ALBUMIN 3.2*   BILITOT 0.4   ALKPHOS 82   AST 14   ALT 17   ANIONGAP 10   EGFRNONAA >60   Mag 2.0  Troponin:     Recent Labs  Lab 05/02/17  1534 05/02/17  2124 05/03/17  0318   TROPONINI 0.006 0.011 0.011     Lab Results   Component Value Date    DDIMER 1.12 (H) 05/02/2017     Lab Results   Component Value Date    INR 1.0 05/02/2017       TSH:     Recent Labs  Lab 05/02/17  0933   TSH 3.204     BNP    Recent Labs  Lab 05/02/17  0932   *     Significant Imaging:   Chest, 1 view: The heart is normal in size.  Calcified atheromatous disease affects the aorta.  There is congestion of the pulmonary vascularity.  No consolidation.  There may be small bilateral pleural effusions.  Age-appropriate degenerative changes affect the skeleton.    4/20 echo      Ref Range & Units 13d ago   4yr ago       EF 55 - 65 60 65R    Diastolic Dysfunction  Yes (A)     Aortic Valve Regurgitation  MILD TO MODERATE (A)     Aortic Valve Stenosis  MILD (A)     Est. PA Systolic Pressure  35.95     Mitral Valve Mobility  NORMAL     Tricuspid Valve Regurgitation  MILD      EKGs reviewed

## 2017-05-04 NOTE — DISCHARGE SUMMARY
[facility] Hospital Medicine  Discharge Summary      Patient Name: Aleksandr Schultz  MRN: [id number]  Admission Date: 5/2/2017  Hospital Length of Stay: 0 days  Discharge Date and Time:  05/04/2017 11:59 AM  Attending Physician: Issac Maldonado MD   Discharging Provider: Hazel Cortez NP  Primary Care Provider: Ascencion Cedeño MD      HPI:   Aleksandr Schultz is a 89 y.o. male  With PMH of  AAA, PAD, DM-2, Hyperlipidemia and aortic stenosis who presented to ER with 5 days history of palpitations.  He reports at times his HR races and goes into 100s  And later drops into 40s. He was supposed to have Holter monitor by cardiology OM. He reports no chest pains ; no shortness of breath with these episodes .   He was placed in observation ; so far troponins are negative.  EKG and telemetry is reviewed by Dr conte   Plan ;change labetalol to pindolol.            * No surgery found *      Indwelling Lines/Drains at time of discharge:   Lines/Drains/Airways          No matching active lines, drains, or airways        Hospital Course:   He was placed in obs. He had episode of a fib over night. EKG confirms that this am. Dr Conte saw patient this am. New meds started this am. Discussed AAA- needs u/s to check size, actually smaller and blood thinner has been started. Also discussed anticoagulation/amiodarone as antiarrythmic. He has no chest pain or SOB this am. Daughter at bedside, involved in discussion.      Consults:   Consults         Status Ordering Provider     Inpatient consult to Cardiology[facility]  Once     Provider:  Prieto Conte MD    Acknowledged SHIRLEY CHRISTENSEN     IP consult to case management  Once     Provider:  (Not yet assigned)    Acknowledged ISSAC MALDONADO          Significant Diagnostic Studies: Labs:   CMP   Recent Labs  Lab 05/03/17  0318      K 3.6      CO2 24   *   BUN 14   CREATININE 0.9   CALCIUM 8.8   PROT 6.4   ALBUMIN 3.2*   BILITOT 0.4   ALKPHOS 82   AST  14   ALT 17   ANIONGAP 10   ESTGFRAFRICA >60   EGFRNONAA >60    and CBC   Recent Labs  Lab 05/03/17  0318   WBC 4.76   HGB 12.1*   HCT 35.9*          Pending Diagnostic Studies:     None        Final Active Diagnoses:    Diagnosis Date Noted POA    PRINCIPAL PROBLEM:  Sinus noemy-tachy syndrome [I49.5] 05/02/2017 Yes    Paroxysmal atrial fibrillation [I48.0] 05/02/2017 Yes    Positive D dimer [R79.89] 05/02/2017 Yes    Aortic valve disorder [I35.9] 02/11/2015 Yes     Chronic    PAD (peripheral artery disease) [I73.9] 12/12/2012 Yes      Problems Resolved During this Admission:    Diagnosis Date Noted Date Resolved POA      * Sinus noemy-tachy syndrome  Will try pindolol   DC labetolol    Troponin's  Telemetry      Doing well, trop ok. Will d/c to home with f/up with PCP in 1 week. F/up with cards as well.       PAD (peripheral artery disease)  Continue cilastazole and ASA  D/c cilastazole now in light of a fib and anticoagulation needed      Aortic valve disorder  Continue lasix  Last echo reviewed         Paroxysmal atrial fibrillation  Discussed with Dr conte   Will change labetolol to pindolol  Continue telemetry /holter  A fib noted now on amiodarone and eliquis        Discharged Condition: good    Disposition: to home    Follow Up:  Follow-up Information     Follow up with Lynne Buckner MD. Go in 1 day.    Specialty:  Cardiology    Why:  Outpatient Services    Contact information:    [address] [facility]  [address]  [place] [address]  [phone]          Follow up with Ascencion Cedeño MD In 1 week.    Specialty:  Internal Medicine    Contact information:    [address]  [place] [address]  [phone]          I called and d/c'd metoprolol with [facility].     Patient Instructions:   No discharge procedures on file.  Medications:  Reconciled Home Medications:   Current Discharge Medication List      START taking these medications    Details   amiodarone (PACERONE) 200 MG Tab  Take 1 tablet (200 mg total) by mouth 2 (two) times daily.  Qty: 60 tablet, Refills: 0      apixaban 2.5 mg Tab Take 1 tablet (2.5 mg total) by mouth 2 (two) times daily.  Qty: 60 tablet, Refills: 0      pindolol (VISKEN) 10 MG tablet Take 1 tablet (10 mg total) by mouth 2 (two) times daily.  Qty: 60 tablet, Refills: 0         CONTINUE these medications which have NOT CHANGED    Details   albuterol (PROVENTIL) 2.5 mg /3 mL (0.083 %) nebulizer solution Take 2.5 mg by nebulization 4 (four) times daily as needed.       amlodipine (NORVASC) 10 MG tablet TAKE 1 TABLET ONE TIME DAILY  Qty: 90 tablet, Refills: 3      aspirin (ECOTRIN) 81 MG EC tablet Take 81 mg by mouth once daily.        atorvastatin (LIPITOR) 40 MG tablet Take 40 mg by mouth once daily.      cinnamon bark (CINNAMON) 500 mg capsule Take 500 mg by mouth once daily.      cyclobenzaprine (FLEXERIL) 10 MG tablet Take 10 mg by mouth 3 (three) times daily as needed.        fish oil-omega-3 fatty acids 300-1,000 mg capsule Take 2 g by mouth once daily.        FLAXSEED OIL ORAL Take 1 tablet by mouth once daily.       folic acid (FOLVITE) 1 MG tablet Take 1 mg by mouth once daily.      furosemide (LASIX) 40 MG tablet TAKE 1 TABLET ONE TIME DAILY  Qty: 90 tablet, Refills: 3      garlic 1,000 mg Cap Take 1 tablet by mouth 2 (two) times daily.       glimepiride (AMARYL) 4 MG tablet Take 4 mg by mouth daily with breakfast. Pt taking one pill twice a day.      ipratropium (ATROVENT) 0.02 % nebulizer solution Take 500 mcg by nebulization 4 (four) times daily. PRN      lisinopril 10 MG tablet Take 1 tablet (10 mg total) by mouth once daily.  Qty: 90 tablet, Refills: 3    Associated Diagnoses: Hypertension, essential      loratadine 10 mg Cap Take 1 tablet by mouth once daily.       metformin (GLUCOPHAGE) 500 MG tablet Take 500 mg by mouth daily with breakfast.      mometasone (NASONEX) 50 mcg/actuation nasal spray 2 sprays by Nasal route daily as needed.        nitroGLYCERIN (NITROSTAT) 0.4 MG SL tablet Place 0.4 mg under the tongue every 5 (five) minutes as needed (PRN).       potassium chloride (MICRO-K) 10 MEQ CpSR Take 1 capsule (10 mEq total) by mouth once daily.  Qty: 90 capsule, Refills: 3    Associated Diagnoses: Hypokalemia      ranitidine (ZANTAC) 150 MG tablet Take 150 mg by mouth 2 (two) times daily.        tamsulosin (FLOMAX) 0.4 mg Cp24 Take 0.4 mg by mouth once daily.      vitamin D 1000 units Tab Take 1,000 Units by mouth once daily.         STOP taking these medications       cilostazol (PLETAL) 100 MG Tab Comments:   Reason for Stopping:         labetalol (NORMODYNE) 200 MG tablet Comments:   Reason for Stopping:             Time spent on the discharge of patient: 20 minutes    Hazel Cortez NP  Department of Hospital Medicine  [facility]

## 2017-05-04 NOTE — PLAN OF CARE
"Problem: Patient Care Overview  Goal: Plan of Care Review  Outcome: Ongoing (interventions implemented as appropriate)  Vitals stable, afebrile, no signs of distress noted. Pt denies any pain, SOB or "funny feeling in chest" this shift. Pt is going in and out of A-FIB. At present time he is SB with 1 degree as per cardiac monitoring from ICU nurse. Pt received Amiodarone and Elequis this shift. Blood sugar elevated, but not enough for insulin during this shift. SCD's. Fall precautions, call bell in reach, daughter at bedside. Pt agrees with plan of care, no questions at this time.       "

## 2017-05-04 NOTE — ASSESSMENT & PLAN NOTE
The proximal, mid, and distal abdominal aorta have a maximum transaxial diameter of 2.9, 1.9, and 2.6 cm respectively. The proximal right and left common iliac arteries measure 1.3 and 1.4 cm respectively. This is within normal limits.

## 2017-05-04 NOTE — ASSESSMENT & PLAN NOTE
89 yrs old is allergic to iodinated contrast; on metformin; CKD   All make me nervous about CTA chest   Will do VQ scan--neg pe  Compression device for dvt prophlaxis

## 2017-05-04 NOTE — ASSESSMENT & PLAN NOTE
Discussed with Dr conte   Will change labetolol to pindolol  Continue telemetry /holter  A fib noted now on amiodarone and eliquis and doing well

## 2017-05-04 NOTE — PROGRESS NOTES
[facility] Medicine  Progress Note    Patient Name: Aleksandr Schultz  MRN: [id number]  Patient Class: OP- Observation   Admission Date: 5/2/2017  Length of Stay: 0 days  Attending Physician: Issac Hobson MD  Primary Care Provider: Ascencion Cedeño MD        Subjective:     Principal Problem:Sinus noemy-tachy syndrome    HPI:  Aleksandr Schultz is a 89 y.o. male  With PMH of  AAA, PAD, DM-2, Hyperlipidemia and aortic stenosis who presented to ER with 5 days history of palpitations.  He reports at times his HR races and goes into 100s  And later drops into 40s. He was supposed to have Holter monitor by cardiology [facility] [place]. He reports no chest pains ; no shortness of breath with these episodes .   He was placed in observation ; so far troponins are negative.  EKG and telemetry is reviewed by Dr conte   Plan ;change labetalol to pindolol.            Hospital Course:  He was placed in obs. He had episode of a fib over night. EKG confirms that this am. Dr Conte saw patient this am. New meds started this am. Discussed AAA- needs u/s to check size. Also discussed anticoagulation/amiodarone as antiarrythmic. He has no chest pain or SOB this am. Daughter at bedside, involved in discussion    Interval note: feeling well this am, no complaints. New onset a fib  Review of Systems   Constitutional: Negative for chills and fever.   HENT: Positive for hearing loss. Negative for congestion, postnasal drip and sore throat.    Eyes: Negative for photophobia.   Respiratory: Negative for chest tightness and shortness of breath.    Cardiovascular: Positive for leg swelling. Negative for chest pain.        Leg edema     Gastrointestinal: Negative for abdominal distention, abdominal pain, blood in stool and vomiting.   Genitourinary: Negative for dysuria, flank pain and hematuria.   Musculoskeletal: Positive for back pain, gait problem and myalgias.   Skin: Negative for pallor.   Neurological: Negative for dizziness,  seizures, facial asymmetry, speech difficulty and numbness.   Hematological: Does not bruise/bleed easily.   Psychiatric/Behavioral: Negative for agitation and suicidal ideas. The patient is nervous/anxious.      Objective:     Vital Signs (Most Recent):  Temp: 96.2 °F (35.7 °C) (05/03/17 0743)  Pulse: 66 (05/03/17 0800)  Resp: 16 (05/03/17 0755)  BP: (!) 162/68 (05/03/17 0743)  SpO2: (!) 94 % (05/03/17 0755) Vital Signs (24h Range):  Temp:  [96.2 °F (35.7 °C)-97.9 °F (36.6 °C)] 96.2 °F (35.7 °C)  Pulse:  [] 66  Resp:  [15-20] 16  SpO2:  [92 %-98 %] 94 %  BP: (132-180)/(53-93) 162/68     Weight: 82.6 kg (182 lb 1.6 oz)  Body mass index is 26.89 kg/(m^2).    Physical Exam   Constitutional: He is oriented to person, place, and time. He appears well-developed and well-nourished.   HENT:   Head: Normocephalic and atraumatic.   Nose: Nose normal.   Mouth/Throat: Oropharynx is clear and moist.   Eyes: Conjunctivae and EOM are normal. Pupils are equal, round, and reactive to light.   Neck: Normal range of motion. Neck supple. No JVD present. No thyromegaly present.   Cardiovascular: Normal rate, regular rhythm, normal heart sounds and intact distal pulses.    Pulmonary/Chest: Effort normal and breath sounds normal.   Abdominal: Soft. Bowel sounds are normal. He exhibits no distension and no mass. There is no tenderness. There is no guarding.   Musculoskeletal: Normal range of motion. He exhibits edema.   Lymphadenopathy:     He has no cervical adenopathy.   Neurological: He is alert and oriented to person, place, and time. He has normal reflexes. No cranial nerve deficit.   Skin: Skin is warm and dry. No rash noted. No pallor.   Psychiatric: He has a normal mood and affect.   Nursing note and vitals reviewed.       Significant Labs:   CBC:     Recent Labs  Lab 05/02/17  0932 05/03/17  0318   WBC 4.46 4.76   HGB 12.5* 12.1*   HCT 37.5* 35.9*    202     CMP:     Recent Labs  Lab 05/02/17  0932 05/03/17 0318     144   K 3.2* 3.6    110   CO2 28 24   * 127*   BUN 16 14   CREATININE 1.1 0.9   CALCIUM 8.8 8.8   PROT 7.0 6.4   ALBUMIN 3.6 3.2*   BILITOT 0.6 0.4   ALKPHOS 80 82   AST 17 14   ALT 17 17   ANIONGAP 10 10   EGFRNONAA 59* >60   Mag 2.0  Troponin:     Recent Labs  Lab 05/02/17  1534 05/02/17  2124 05/03/17  0318   TROPONINI 0.006 0.011 0.011     Lab Results   Component Value Date    DDIMER 1.12 (H) 05/02/2017     Lab Results   Component Value Date    INR 1.0 05/02/2017       TSH:     Recent Labs  Lab 05/02/17  0933   TSH 3.204     BNP    Recent Labs  Lab 05/02/17  0932   *     Significant Imaging:   Chest, 1 view: The heart is normal in size.  Calcified atheromatous disease affects the aorta.  There is congestion of the pulmonary vascularity.  No consolidation.  There may be small bilateral pleural effusions.  Age-appropriate degenerative changes affect the skeleton.    4/20 echo      Ref Range & Units 13d ago   4yr ago       EF 55 - 65 60 65R    Diastolic Dysfunction  Yes (A)     Aortic Valve Regurgitation  MILD TO MODERATE (A)     Aortic Valve Stenosis  MILD (A)     Est. PA Systolic Pressure  35.95     Mitral Valve Mobility  NORMAL     Tricuspid Valve Regurgitation  MILD      EKGs reviewed    Assessment/Plan:      * Sinus noemy-tachy syndrome  Will try pindolol   DC labetolol    Troponin's  Telemetry        PAD (peripheral artery disease)  Continue cilastazole and ASA  D/c cilastazole now in light of a fib and anticoagulation needed      Aortic valve disorder  Continue lasix  Watch for dehydration  Last echo reviewed         Paroxysmal atrial fibrillation  Discussed with Dr conte   Will change labetolol to pindolol  Continue telemetry /holter  A fib noted now on amiodarone and eliquis      Positive D dimer  89 yrs old is allergic to iodinated contrast; on metformin; CKD   All make me nervous about CTA chest   Will do VQ scan   Compression device for dvt prophlaxis    VTE Risk Mitigation          Ordered     apixaban tablet 2.5 mg  2 times daily     Route:  Oral        05/03/17 0902     Place sequential compression device  Until discontinued      05/02/17 1651     Medium Risk of VTE  Once      05/02/17 1358     Place sequential compression device  Until discontinued      05/02/17 1359          Thom Haque MD  Department of Hospital Medicine   [facility]

## 2017-05-04 NOTE — PROGRESS NOTES
Nursing Notes  Bedside report received. Pt stable, daughter at bedside. No acute distress noted. Pt denies any needs at this time.      Huddle Comments

## 2017-05-04 NOTE — ASSESSMENT & PLAN NOTE
Will try pindolol   DC labetolol    Troponin's  Telemetry    Doing well, trop ok. Will d/c to home with f/up with PCP in 1 week. F/up with cards as well.

## 2017-05-04 NOTE — PROGRESS NOTES
Vitals remained stable, afebrile. No complaints or concerns. Glucose under control. BP high at times, meds adjusted. Pt in and out of afib but rate is controled. Pt asymptomatic. Ambulating, eating, and voiding well. meds sent to pharmacy. IV removed. Site wrapped. Discharge instructions discussed with pt and daughter. Both stated understanding. Paperwork give to daughter.

## 2017-05-04 NOTE — PROGRESS NOTES
[facility] Medicine  Progress Note    Patient Name: Aleksandr Schultz  MRN: [id number]  Patient Class: OP- Observation   Admission Date: 5/2/2017  Length of Stay: 0 days  Attending Physician: Issac Hobson MD  Primary Care Provider: Ascencion Cedeño MD        Subjective:     Principal Problem:Sinus noemy-tachy syndrome    HPI:  Aleksandr Schultz is a 89 y.o. male  With PMH of  AAA, PAD, DM-2, Hyperlipidemia and aortic stenosis who presented to ER with 5 days history of palpitations.  He reports at times his HR races and goes into 100s  And later drops into 40s. He was [place] to have [place]ter [place] [facility] [place] He reports no chest pains ; no shortness of breath with these episodes .   He was placed in observation ; so far troponins are negative.  EKG and telemetry is reviewed by Dr conte   Plan ;change labetalol to pindolol.            Hospital Course:  He was placed in obs. He had episode of a fib over night. EKG confirms that this am. Dr Conte saw patient this am. New meds started this am. Discussed AAA- needs u/s to check size, actually smaller and blood thinner has been started. Also discussed anticoagulation/amiodarone as antiarrythmic. He has no chest pain or SOB this am. Daughter at bedside, involved in discussion. He has done well and denies any further palpitations and is ready to go home.    Interval note: feeling well this am, no complaints.     Review of Systems   Constitutional: Negative for chills and fever.   HENT: Positive for hearing loss. Negative for congestion, postnasal drip and sore throat.    Eyes: Negative for photophobia.   Respiratory: Negative for chest tightness and shortness of breath.    Cardiovascular: Negative for chest pain and leg swelling.        Leg edema better     Gastrointestinal: Negative for abdominal distention, abdominal pain, blood in stool and vomiting.   Genitourinary: Negative for dysuria, flank pain and hematuria.   Musculoskeletal: Positive  for back pain. Negative for gait problem and myalgias.   Skin: Negative for pallor.   Neurological: Negative for dizziness, seizures, facial asymmetry, speech difficulty and numbness.   Hematological: Does not bruise/bleed easily.   Psychiatric/Behavioral: Negative for agitation and suicidal ideas. The patient is not nervous/anxious.      Objective:     Vital Signs (Most Recent):  Temp: 97.1 °F (36.2 °C) (05/04/17 0719)  Pulse: (!) 56 (05/04/17 1000)  Resp: 16 (05/04/17 0809)  BP: (!) 168/76 (05/04/17 0719)  SpO2: 97 % (05/04/17 0809) Vital Signs (24h Range):  Temp:  [96.5 °F (35.8 °C)-98.3 °F (36.8 °C)] 97.1 °F (36.2 °C)  Pulse:  [48-65] 56  Resp:  [16-18] 16  SpO2:  [92 %-97 %] 97 %  BP: (135-168)/(62-76) 168/76     Weight: 82.6 kg (182 lb 1.6 oz)  Body mass index is 26.89 kg/(m^2).    Physical Exam   Constitutional: He is oriented to person, place, and time. He appears well-developed and well-nourished.   HENT:   Head: Normocephalic and atraumatic.   Nose: Nose normal.   Mouth/Throat: Oropharynx is clear and moist.   Bilateral hearing aids   Eyes: Conjunctivae and EOM are normal. Pupils are equal, round, and reactive to light.   Neck: Normal range of motion. Neck supple. No JVD present. No thyromegaly present.   Cardiovascular: Normal rate and intact distal pulses.    Murmur heard.  Pulmonary/Chest: Effort normal and breath sounds normal.   Abdominal: Soft. Bowel sounds are normal. He exhibits no distension and no mass. There is no tenderness. There is no guarding.   Musculoskeletal: Normal range of motion. He exhibits no edema.   Lymphadenopathy:     He has no cervical adenopathy.   Neurological: He is alert and oriented to person, place, and time. He has normal reflexes. No cranial nerve deficit.   Skin: Skin is warm and dry. No rash noted. No pallor.   Psychiatric: He has a normal mood and affect.   Nursing note and vitals reviewed.       Significant Labs:   CBC:     Recent Labs  Lab 05/03/17  0318   WBC 4.76    HGB 12.1*   HCT 35.9*        CMP:     Recent Labs  Lab 05/03/17  0318      K 3.6      CO2 24   *   BUN 14   CREATININE 0.9   CALCIUM 8.8   PROT 6.4   ALBUMIN 3.2*   BILITOT 0.4   ALKPHOS 82   AST 14   ALT 17   ANIONGAP 10   EGFRNONAA >60   Mag 2.0  Troponin:     Recent Labs  Lab 05/02/17  1534 05/02/17  2124 05/03/17  0318   TROPONINI 0.006 0.011 0.011     Lab Results   Component Value Date    DDIMER 1.12 (H) 05/02/2017     Lab Results   Component Value Date    INR 1.0 05/02/2017       TSH:     Recent Labs  Lab 05/02/17  0933   TSH 3.204     BNP    Recent Labs  Lab 05/02/17  0932   *     Significant Imaging:   Chest, 1 view: The heart is normal in size.  Calcified atheromatous disease affects the aorta.  There is congestion of the pulmonary vascularity.  No consolidation.  There may be small bilateral pleural effusions.  Age-appropriate degenerative changes affect the skeleton.    4/20 echo      Ref Range & Units 13d ago   4yr ago       EF 55 - 65 60 65R    Diastolic Dysfunction  Yes (A)     Aortic Valve Regurgitation  MILD TO MODERATE (A)     Aortic Valve Stenosis  MILD (A)     Est. PA Systolic Pressure  35.95     Mitral Valve Mobility  NORMAL     Tricuspid Valve Regurgitation  MILD      EKGs reviewed    Assessment/Plan:      * Sinus noemy-tachy syndrome  Will try pindolol   DC labetolol    Troponin's  Telemetry    Doing well, trop ok. Will d/c to home with f/up with PCP in 1 week. F/up with cards as well.       PAD (peripheral artery disease)  D/c cilastazole now in light of a fib and anticoagulation needed      AAA (abdominal aortic aneurysm)  The proximal, mid, and distal abdominal aorta have a maximum transaxial diameter of 2.9, 1.9, and 2.6 cm respectively. The proximal right and left common iliac arteries measure 1.3 and 1.4 cm respectively. This is within normal limits.      Aortic valve disorder  Continue lasix  Last echo reviewed   Cardiology following      Paroxysmal  atrial fibrillation  Discussed with Dr conte   Will change labetolol to pindolol  Continue telemetry /holter  A fib noted now on amiodarone and eliquis and doing well      Positive D dimer  89 yrs old is allergic to iodinated contrast; on metformin; CKD   All make me nervous about CTA chest   Will do VQ scan--neg pe  Compression device for dvt prophlaxis    VTE Risk Mitigation         Ordered     apixaban tablet 2.5 mg  2 times daily     Route:  Oral        05/03/17 0902     Place sequential compression device  Until discontinued      05/02/17 1651     Medium Risk of VTE  Once      05/02/17 1358     Place sequential compression device  Until discontinued      05/02/17 1359          Zuly Stock MD  Department of Hospital Medicine   [facility]

## 2017-05-04 NOTE — PLAN OF CARE
05/04/17 1206   Final Note   Assessment Type Final Discharge Note   Discharge Disposition Home   Discharge planning education complete? Yes   Hospital Follow Up  Appt(s) scheduled? Yes   Discharge plans and expectations educations in teach back method with documentation complete? Yes   Offered [facility] Bedside Delivery? Yes   Discharge/Hospital Encounter Summary to ([facility]) PCP Yes   Referral to Outpatient Case Management complete? n/a   Referral to / orders for Home Health Complete? n/a   30 day supply of medicines given at discharge, if documented non-compliance / non-adherence? n/a   Any social issues identified prior to discharge? n/a   Did you assess the readiness or willingness of the family or caregiver to support self management of care? Yes   Right Care Referral Info   Post Acute Recommendation No Care   Patient and daughter refused home health.  He has good family support and he cares for his spouse who is on hospice.

## 2017-05-04 NOTE — PROGRESS NOTES
LAB called about urgent Troponin that is to be drawn at 0318. Pt last troponin was drawn at 05/03/2017 at 0318, and all were normal. No troponin's were drawn during day shift. I stated to cancel that troponin and will re-evaluate in AM.

## 2017-05-05 NOTE — PHYSICIAN QUERY
PT Name: Aleksandr Schultz  MR #: [id number]    Physician Query Form - Consultant Diagnosis Clarification     CDS/: Guerda Lucero               Contact information:  Ext [phone]  This form is a permanent document in the medical record.     Query Date: May 5, 2017      By submitting this query, we are merely seeking further clarification of documentation.  Please utilize your independent clinical judgment when addressing the question(s) below.      The Medical record contains the following:   Diagnosis Location in Medical Record       hyperparathyroidism     Dr. Dennis consult         Do you agree with the Consultants diagnosis of ___hyperparathyroidism_____________?                 [   ]   Yes               [   ]   Yes, but it resolved prior to my assessment of the patient               [   ]   No                [   ]   Clinically insignificant               [   ]   Clinically undetermined               [  x ]   Other/Clarification of findings: ____in past, but not addressed in this stay, also asymptomatic currently with normal calcium_______________________________________

## 2017-05-09 ENCOUNTER — OFFICE VISIT (OUTPATIENT)
Dept: CARDIOLOGY | Facility: CLINIC | Age: 82
End: 2017-05-09
Payer: MEDICARE

## 2017-05-09 ENCOUNTER — PATIENT MESSAGE (OUTPATIENT)
Dept: CARDIOLOGY | Facility: CLINIC | Age: 82
End: 2017-05-09

## 2017-05-09 VITALS
HEIGHT: 69 IN | HEART RATE: 59 BPM | SYSTOLIC BLOOD PRESSURE: 158 MMHG | WEIGHT: 168.44 LBS | BODY MASS INDEX: 24.95 KG/M2 | DIASTOLIC BLOOD PRESSURE: 66 MMHG

## 2017-05-09 DIAGNOSIS — I73.9 PAD (PERIPHERAL ARTERY DISEASE): ICD-10-CM

## 2017-05-09 DIAGNOSIS — I35.0 NONRHEUMATIC AORTIC VALVE STENOSIS: ICD-10-CM

## 2017-05-09 DIAGNOSIS — I35.1 NONRHEUMATIC AORTIC VALVE INSUFFICIENCY: ICD-10-CM

## 2017-05-09 DIAGNOSIS — I49.5 SINUS BRADY-TACHY SYNDROME: ICD-10-CM

## 2017-05-09 DIAGNOSIS — E11.40 TYPE 2 DIABETES, CONTROLLED, WITH NEUROPATHY: ICD-10-CM

## 2017-05-09 DIAGNOSIS — I10 ESSENTIAL HYPERTENSION: Chronic | ICD-10-CM

## 2017-05-09 DIAGNOSIS — I71.40 ABDOMINAL AORTIC ANEURYSM (AAA) WITHOUT RUPTURE: Chronic | ICD-10-CM

## 2017-05-09 DIAGNOSIS — I48.0 PAROXYSMAL ATRIAL FIBRILLATION: Primary | ICD-10-CM

## 2017-05-09 DIAGNOSIS — I77.9 BILATERAL CAROTID ARTERY DISEASE: Chronic | ICD-10-CM

## 2017-05-09 DIAGNOSIS — J43.8 OTHER EMPHYSEMA: Chronic | ICD-10-CM

## 2017-05-09 DIAGNOSIS — E78.00 PURE HYPERCHOLESTEROLEMIA: ICD-10-CM

## 2017-05-09 PROCEDURE — 99999 PR PBB SHADOW E&M-EST. PATIENT-LVL III: CPT | Mod: PBBFAC,,, | Performed by: NURSE PRACTITIONER

## 2017-05-09 PROCEDURE — 1159F MED LIST DOCD IN RCRD: CPT | Mod: S$GLB,,, | Performed by: NURSE PRACTITIONER

## 2017-05-09 PROCEDURE — 1126F AMNT PAIN NOTED NONE PRSNT: CPT | Mod: S$GLB,,, | Performed by: NURSE PRACTITIONER

## 2017-05-09 PROCEDURE — 1160F RVW MEDS BY RX/DR IN RCRD: CPT | Mod: S$GLB,,, | Performed by: NURSE PRACTITIONER

## 2017-05-09 PROCEDURE — 99214 OFFICE O/P EST MOD 30 MIN: CPT | Mod: S$GLB,,, | Performed by: NURSE PRACTITIONER

## 2017-05-09 NOTE — MR AVS SNAPSHOT
[facility]  [address]  [facility] [address]  Phone: [phone]                  Aleksandr Schultz   2017 11:00 AM   Office Visit    Description:  Male : 1926   Provider:  Fiorella Del Castillo NP   Department:  [facility]           Reason for Visit     Sinus noemy-tachy syndrome     Atrial Fibrillation     Neck Pain           Diagnoses this Visit        Comments    Paroxysmal atrial fibrillation    -  Primary     Sinus noemy-tachy syndrome         Abdominal aortic aneurysm (AAA) without rupture         Aortic valve disorder         Bilateral carotid artery disease         Essential hypertension         PAD (peripheral artery disease)                To Do List           Future Appointments        Provider Department Dept Phone    5/10/2017 9:10 AM LAB, [facility] [phone]      Goals (5 Years of Data)     None      Follow-Up and Disposition     Return in about 4 weeks (around 2017).      [facility]     [facility] Care [facility] -  Assistance  Unless otherwise directed by your provider, please contact [facility], our nurse care line that is available for  assistance.     Registered nurses in the [facility] provide: appointment scheduling, clinical advisement, health education, and other advisory services.  Call: [phone] (toll free)               Medications           Message regarding Medications     Verify the changes and/or additions to your medication regime listed below are the same as discussed with your clinician today.  If any of these changes or additions are incorrect, please notify your healthcare provider.             Verify that the below list of medications is an accurate representation of the medications you are currently taking.  If none reported, the list may be blank. If incorrect, please contact your healthcare provider. Carry this list with you in case of emergency.            Current Medications     albuterol (PROVENTIL) 2.5 mg /3 mL (0.083 %) nebulizer solution Take 2.5 mg by nebulization 4 (four) times daily as needed.     amiodarone (PACERONE) 200 MG Tab Take 1 tablet (200 mg total) by mouth 2 (two) times daily.    amlodipine (NORVASC) 10 MG tablet TAKE 1 TABLET ONE TIME DAILY    apixaban 2.5 mg Tab Take 1 tablet (2.5 mg total) by mouth 2 (two) times daily.    aspirin (ECOTRIN) 81 MG EC tablet Take 81 mg by mouth once daily.      atorvastatin (LIPITOR) 40 MG tablet Take 40 mg by mouth once daily.    cinnamon bark (CINNAMON) 500 mg capsule Take 500 mg by mouth once daily.    cyclobenzaprine (FLEXERIL) 10 MG tablet Take 10 mg by mouth 3 (three) times daily as needed.      fish oil-omega-3 fatty acids 300-1,000 mg capsule Take 2 g by mouth once daily.      FLAXSEED OIL ORAL Take 1 tablet by mouth once daily.     folic acid (FOLVITE) 1 MG tablet Take 1 mg by mouth once daily.    furosemide (LASIX) 40 MG tablet TAKE 1 TABLET ONE TIME DAILY    garlic 1,000 mg Cap Take 1 tablet by mouth 2 (two) times daily.     glimepiride (AMARYL) 4 MG tablet Take 4 mg by mouth daily with breakfast. Pt taking one pill twice a day.    ipratropium (ATROVENT) 0.02 % nebulizer solution Take 500 mcg by nebulization 4 (four) times daily. PRN    lisinopril 10 MG tablet Take 1 tablet (10 mg total) by mouth once daily.    loratadine 10 mg Cap Take 1 tablet by mouth once daily.     metformin (GLUCOPHAGE) 500 MG tablet Take 500 mg by mouth daily with breakfast.    mometasone (NASONEX) 50 mcg/actuation nasal spray 2 sprays by Nasal route daily as needed.     nitroGLYCERIN (NITROSTAT) 0.4 MG SL tablet Place 0.4 mg under the tongue every 5 (five) minutes as needed (PRN).     pindolol (VISKEN) 10 MG tablet Take 1 tablet (10 mg total) by mouth 2 (two) times daily.    potassium chloride (MICRO-K) 10 MEQ CpSR Take 1 capsule (10 mEq total) by mouth once daily.    ranitidine (ZANTAC) 150 MG tablet Take 150 mg by mouth 2  "(two) times daily.      tamsulosin (FLOMAX) 0.4 mg Cp24 Take 0.4 mg by mouth once daily.    vitamin D 1000 units Tab Take 1,000 Units by mouth once daily.           Clinical Reference Information           Your Vitals Were     BP Pulse Height Weight BMI    158/66 (BP Location: Left arm, Patient Position: Sitting, BP Method: Automatic) 59 5' 9" (1.753 m) 76.4 kg (168 lb 6.9 oz) 24.87 kg/m2      Blood Pressure          Most Recent Value    Right Arm BP - Sitting  157/69    Left Arm BP - Sitting  158/66    BP  (!)  158/66      Allergies as of 5/9/2017     Iodinated Contrast Media - Oral And Iv Dye      Immunizations Administered on Date of Encounter - 5/9/2017     None      Language Assistance Services     ATTENTION: Language assistance services are available, free of charge. Please call [phone].      ATENCIÓN: Si paul kenney, tiene a elder disposición servicios gratuitos de asistencia lingüística. Llame al [phone].     KASSY Ý: N?u b?n nói Ti?ng Vi?t, có các d?ch v? h? tr? ngôn ng? mi?n phí dành cho b?n. G?i s? [phone].         [facility] complies with applicable Federal civil rights laws and does not discriminate on the basis of race, color, national origin, age, disability, or sex.        "

## 2017-05-09 NOTE — PROGRESS NOTES
Mr. Schultz is a patient of Dr. Buckner and was last seen in [facility] 4/10/17.    Subjective:   Patient ID:  Aleksandr Schultz is a 89 y.o. male who presents for follow-up of Sinus noemy-tachy syndrome (Hospital d/c 5/4/17); Atrial Fibrillation; and Neck Pain (x 4 days)    HPI  Mr. Schultz is a 89y.o.  male with CAD (MI in 1982), HTN, MICHELLE, COPD, DM type II, and CKD.  Presents today following a hospital admission for sinus noemy-tachy syndrome.  [facility] [place] [facility]2-5/4/17 and was found to have paroxysmal atrial fibrillation.  His heart rate is controlled in the 50s.  Blood pressure is still elevated in the 130-150s/60s.  Patient denies chest pain with exertion or at rest, palpitations,  dizziness, syncope, edema, orthopnea, PND, or claudication. Patient can walk up a flight of stairs without stopping for angina or dyspnea.  Reports neck pain that started Friday afternoon, which is improved with vicks vapor rub and tylenol.  States that he had this once before and had to have PT.  Reports some improvement over the last three days.  He is a former smoker quit in 1977.  Weighs self daily.  Reports low salt diet.      Echo 4/20/17:    1 - Normal left ventricular systolic function (EF 60-65%).     2 - No wall motion abnormalities.     3 - Impaired LV relaxation, elevated LAP (grade 2 diastolic dysfunction).     4 - Normal right ventricular systolic function .     5 - Mild aortic stenosis, SHERON = 2.31 cm2, peak velocity = 2.82 m/s, mean gradient = 18.69 mmHg.     6 - Mild to moderate aortic regurgitation.     7 - Mild tricuspid regurgitation.     8 - The estimated PA systolic pressure is 36 mmHg.     Carotid Ultrasound 4/20/17 (unchanged from 9/2016):  There is 20 - 39% right Internal Carotid stenosis.  There is 40 - 49% left Internal Carotid stenosis.    Review of Systems   Constitution: Negative for decreased appetite, diaphoresis, weakness, malaise/fatigue, weight gain and weight loss.   HENT:  Negative for headaches.    Eyes: Negative for visual disturbance.   Cardiovascular: Positive for dyspnea on exertion (Reports GONZALEZ is not worse than his baseline. POX on room air 96%). Negative for chest pain, claudication, irregular heartbeat, leg swelling, near-syncope, orthopnea, palpitations, paroxysmal nocturnal dyspnea and syncope.        Denies chest pressure   Respiratory: Negative for cough, hemoptysis, shortness of breath, sleep disturbances due to breathing and snoring.    Endocrine: Negative for cold intolerance and heat intolerance.   Hematologic/Lymphatic: Negative for bleeding problem. Does not bruise/bleed easily.   Musculoskeletal: Positive for back pain and neck pain. Negative for myalgias.   Gastrointestinal: Negative for bloating, abdominal pain, anorexia, change in bowel habit, constipation, diarrhea, nausea and vomiting.   Neurological: Negative for difficulty with concentration, disturbances in coordination, excessive daytime sleepiness, dizziness, light-headedness, loss of balance and numbness.   Psychiatric/Behavioral: The patient does not have insomnia.      Allergies and current medications updated and reviewed:  Review of patient's allergies indicates:   Allergen Reactions    Iodinated contrast media - oral and iv dye      Current Outpatient Prescriptions   Medication Sig    albuterol (PROVENTIL) 2.5 mg /3 mL (0.083 %) nebulizer solution Take 2.5 mg by nebulization 4 (four) times daily as needed.     amiodarone (PACERONE) 200 MG Tab Take 1 tablet (200 mg total) by mouth 2 (two) times daily.    amlodipine (NORVASC) 10 MG tablet TAKE 1 TABLET ONE TIME DAILY    apixaban 2.5 mg Tab Take 1 tablet (2.5 mg total) by mouth 2 (two) times daily.    aspirin (ECOTRIN) 81 MG EC tablet Take 81 mg by mouth once daily.      atorvastatin (LIPITOR) 40 MG tablet Take 40 mg by mouth once daily.    cinnamon bark (CINNAMON) 500 mg capsule Take 500 mg by mouth once daily.    cyclobenzaprine (FLEXERIL)  "10 MG tablet Take 10 mg by mouth 3 (three) times daily as needed.      fish oil-omega-3 fatty acids 300-1,000 mg capsule Take 2 g by mouth once daily.      FLAXSEED OIL ORAL Take 1 tablet by mouth once daily.     folic acid (FOLVITE) 1 MG tablet Take 1 mg by mouth once daily.    furosemide (LASIX) 40 MG tablet TAKE 1 TABLET ONE TIME DAILY    garlic 1,000 mg Cap Take 1 tablet by mouth 2 (two) times daily.     glimepiride (AMARYL) 4 MG tablet Take 4 mg by mouth daily with breakfast. Pt taking one pill twice a day.    ipratropium (ATROVENT) 0.02 % nebulizer solution Take 500 mcg by nebulization 4 (four) times daily. PRN    lisinopril 10 MG tablet Take 1 tablet (10 mg total) by mouth once daily.    loratadine 10 mg Cap Take 1 tablet by mouth once daily.     metformin (GLUCOPHAGE) 500 MG tablet Take 500 mg by mouth daily with breakfast.    mometasone (NASONEX) 50 mcg/actuation nasal spray 2 sprays by Nasal route daily as needed.     nitroGLYCERIN (NITROSTAT) 0.4 MG SL tablet Place 0.4 mg under the tongue every 5 (five) minutes as needed (PRN).     pindolol (VISKEN) 10 MG tablet Take 1 tablet (10 mg total) by mouth 2 (two) times daily.    potassium chloride (MICRO-K) 10 MEQ CpSR Take 1 capsule (10 mEq total) by mouth once daily.    ranitidine (ZANTAC) 150 MG tablet Take 150 mg by mouth 2 (two) times daily.      tamsulosin (FLOMAX) 0.4 mg Cp24 Take 0.4 mg by mouth once daily.    vitamin D 1000 units Tab Take 1,000 Units by mouth once daily.     No current facility-administered medications for this visit.      Objective:     Right Arm BP - Sittin/69 (17 1107)  Left Arm BP - Sittin/66 (17 1107)    BP (!) 158/66 (BP Location: Left arm, Patient Position: Sitting, BP Method: Automatic)  Pulse (!) 59  Ht 5' 9" (1.753 m)  Wt 76.4 kg (168 lb 6.9 oz)  BMI 24.87 kg/m2    Physical Exam   Constitutional: He is oriented to person, place, and time. Vital signs are normal. He appears " well-developed and well-nourished. He is active and cooperative. No distress.   HENT:   Head: Normocephalic and atraumatic.   Eyes: Conjunctivae and lids are normal. No scleral icterus.   Neck: Neck supple. Normal carotid pulses, no hepatojugular reflux and no JVD present. Carotid bruit is not present.   Cardiovascular: Regular rhythm, S1 normal, S2 normal and intact distal pulses.  Bradycardia present.  PMI is not displaced.  Exam reveals no gallop and no friction rub.    Murmur heard.   Harsh midsystolic murmur is present with a grade of 2/6  at the upper right sternal border radiating to the neck Can not palpate PT pulse secondary to edema.  Pulses:       Carotid pulses are 2+ on the right side, and 2+ on the left side.       Radial pulses are 2+ on the right side, and 2+ on the left side.        Dorsalis pedis pulses are 2+ on the right side, and 2+ on the left side.        Posterior tibial pulses are 0 on the right side, and 0 on the left side.   Pulmonary/Chest: Effort normal and breath sounds normal. No respiratory distress. He has no decreased breath sounds. He has no wheezes. He has no rhonchi. He has no rales. He exhibits no tenderness.   Abdominal: Soft. Normal appearance and bowel sounds are normal. He exhibits no distension, no fluid wave, no abdominal bruit, no ascites and no pulsatile midline mass. There is no hepatosplenomegaly. There is no tenderness.   Musculoskeletal: He exhibits edema (+1 pitting BLE ankle edema).   Neurological: He is alert and oriented to person, place, and time. Gait normal.   Skin: Skin is warm, dry and intact. No rash noted. He is not diaphoretic. Nails show no clubbing.   Psychiatric: He has a normal mood and affect. His speech is normal and behavior is normal. Judgment and thought content normal. Cognition and memory are normal.   Nursing note and vitals reviewed.      Chemistry        Component Value Date/Time     05/03/2017 0318    K 3.6 05/03/2017 0318      05/03/2017 0318    CO2 24 05/03/2017 0318    BUN 14 05/03/2017 0318    CREATININE 0.9 05/03/2017 0318     (H) 05/03/2017 0318        Component Value Date/Time    CALCIUM 8.8 05/03/2017 0318    ALKPHOS 82 05/03/2017 0318    AST 14 05/03/2017 0318    ALT 17 05/03/2017 0318    BILITOT 0.4 05/03/2017 0318          Recent Labs  Lab 04/05/17  0938  05/02/17  0932 05/02/17  0933 05/03/17 0318   WHITE BLOOD CELL COUNT  --   < > 4.46  --  4.76   HEMOGLOBIN  --   < > 12.5 L  --  12.1 L   HEMATOCRIT  --   < > 37.5 L  --  35.9 L   MCV  --   < > 94  --  94   PLATELETS  --   < > 214  --  202   BNP  --   --  113 H  --   --    TSH 2.942  --   --  3.204  --    CHOLESTEROL 144  --   --   --   --    HDL 43  --   --   --   --    LDL CHOLESTEROL 87.8  --   --   --   --    TRIGLYCERIDES 66  --   --   --   --    HDL/CHOLESTEROL RATIO 29.9  --   --   --   --    < > = values in this interval not displayed.      Recent Labs  Lab 05/02/17 0932   INR 1.0      Lab Results   Component Value Date    HGBA1C 7.0 (H) 04/05/2017     Test(s) Reviewed  I have reviewed the following in detail:  [] Stress test   [] Angiography   [x] Echocardiogram   [x] Labs   [x] Other:  EKG      Assessment:     1. Paroxysmal atrial fibrillation  Not in atrial fibrillation today, regular rate and rhythm   2. Sinus noemy-tachy syndrome     3. Abdominal aortic aneurysm (AAA) without rupture  Stable   4. Bilateral carotid artery disease  Stable, L>R.  H/o Right CEA   5. Essential hypertension  Sub-optimally controlled. Goal <150/90 for age.    6. PAD (peripheral artery disease)     7. Nonrheumatic aortic valve insufficiency     8. Nonrheumatic aortic valve stenosis     9. COPD Pox 96% on RA   10. Pure hypercholesterolemia  At goal, LDL<100   11. Type 2 diabetes, controlled, with neuropathy  At goal, A1C 7.0      Plan:     Paroxysmal atrial fibrillation  Comments:  Continue current therapy. F/U with Dr. Buckner in 1 month.    Sinus noemy-tachy  syndrome    Abdominal aortic aneurysm (AAA) without rupture    Bilateral carotid artery disease    Essential hypertension  Comments:  Monitor blood pressure at home and bring log and BP cuff to next visit.    PAD (peripheral artery disease)    Nonrheumatic aortic valve insufficiency    Nonrheumatic aortic valve stenosis    Other emphysema    Pure hypercholesterolemia    Type 2 diabetes, controlled, with neuropathy      Mr. Schultz offers no complaints today.  He would benefit from a low salt diet and increased mobility.  He is largely sedentary d/t back pain.  Discussed low salt diet.  Instructed to bring home BP cuff for calibration in clinic since home pressures are reported lower than in clinic.  Return in about 4 weeks (around 6/6/2017). to see Dr. Buckner.

## 2017-05-26 ENCOUNTER — HOSPITAL ENCOUNTER (INPATIENT)
Facility: HOSPITAL | Age: 82
LOS: 3 days | Discharge: HOME | DRG: 065 | End: 2017-05-29
Attending: FAMILY MEDICINE | Admitting: FAMILY MEDICINE
Payer: MEDICARE

## 2017-05-26 DIAGNOSIS — I15.2 HYPERTENSION ASSOCIATED WITH DIABETES: ICD-10-CM

## 2017-05-26 DIAGNOSIS — I63.9 CEREBROVASCULAR ACCIDENT (CVA), UNSPECIFIED MECHANISM: Primary | ICD-10-CM

## 2017-05-26 DIAGNOSIS — I48.0 PAROXYSMAL ATRIAL FIBRILLATION: ICD-10-CM

## 2017-05-26 DIAGNOSIS — I63.9 CVA (CEREBRAL VASCULAR ACCIDENT): ICD-10-CM

## 2017-05-26 DIAGNOSIS — E11.59 HYPERTENSION ASSOCIATED WITH DIABETES: ICD-10-CM

## 2017-05-26 DIAGNOSIS — G47.33 OSA ON CPAP: ICD-10-CM

## 2017-05-26 DIAGNOSIS — E78.00 PURE HYPERCHOLESTEROLEMIA: ICD-10-CM

## 2017-05-26 DIAGNOSIS — I35.1 NONRHEUMATIC AORTIC VALVE INSUFFICIENCY: ICD-10-CM

## 2017-05-26 DIAGNOSIS — E11.40 TYPE 2 DIABETES, CONTROLLED, WITH NEUROPATHY: ICD-10-CM

## 2017-05-26 LAB — POCT GLUCOSE: 129 MG/DL (ref 70–110)

## 2017-05-26 PROCEDURE — 80061 LIPID PANEL: CPT

## 2017-05-26 PROCEDURE — 36415 COLL VENOUS BLD VENIPUNCTURE: CPT

## 2017-05-26 PROCEDURE — 85652 RBC SED RATE AUTOMATED: CPT

## 2017-05-26 PROCEDURE — 83036 HEMOGLOBIN GLYCOSYLATED A1C: CPT

## 2017-05-26 PROCEDURE — 84443 ASSAY THYROID STIM HORMONE: CPT

## 2017-05-26 PROCEDURE — 94761 N-INVAS EAR/PLS OXIMETRY MLT: CPT

## 2017-05-26 PROCEDURE — 84439 ASSAY OF FREE THYROXINE: CPT

## 2017-05-26 PROCEDURE — 25000242 PHARM REV CODE 250 ALT 637 W/ HCPCS: Performed by: FAMILY MEDICINE

## 2017-05-26 PROCEDURE — 25000003 PHARM REV CODE 250: Performed by: FAMILY MEDICINE

## 2017-05-26 PROCEDURE — 82607 VITAMIN B-12: CPT

## 2017-05-26 PROCEDURE — 86592 SYPHILIS TEST NON-TREP QUAL: CPT

## 2017-05-26 PROCEDURE — 94640 AIRWAY INHALATION TREATMENT: CPT

## 2017-05-26 PROCEDURE — 11000001 HC ACUTE MED/SURG PRIVATE ROOM

## 2017-05-26 RX ORDER — SODIUM CHLORIDE 0.9 % (FLUSH) 0.9 %
3 SYRINGE (ML) INJECTION EVERY 8 HOURS
Status: DISCONTINUED | OUTPATIENT
Start: 2017-05-26 | End: 2017-05-29 | Stop reason: HOSPADM

## 2017-05-26 RX ORDER — NITROGLYCERIN 0.4 MG/1
0.4 TABLET SUBLINGUAL EVERY 5 MIN PRN
Status: DISCONTINUED | OUTPATIENT
Start: 2017-05-26 | End: 2017-05-29 | Stop reason: HOSPADM

## 2017-05-26 RX ORDER — CETIRIZINE HYDROCHLORIDE 10 MG/1
10 TABLET ORAL DAILY
Status: DISCONTINUED | OUTPATIENT
Start: 2017-05-27 | End: 2017-05-29 | Stop reason: HOSPADM

## 2017-05-26 RX ORDER — ALBUTEROL SULFATE 0.83 MG/ML
2.5 SOLUTION RESPIRATORY (INHALATION) EVERY 4 HOURS
Status: DISCONTINUED | OUTPATIENT
Start: 2017-05-26 | End: 2017-05-27

## 2017-05-26 RX ORDER — POTASSIUM CHLORIDE 750 MG/1
10 TABLET, EXTENDED RELEASE ORAL DAILY
Status: DISCONTINUED | OUTPATIENT
Start: 2017-05-27 | End: 2017-05-29 | Stop reason: HOSPADM

## 2017-05-26 RX ORDER — AMIODARONE HYDROCHLORIDE 200 MG/1
200 TABLET ORAL 2 TIMES DAILY
Status: DISCONTINUED | OUTPATIENT
Start: 2017-05-26 | End: 2017-05-29 | Stop reason: HOSPADM

## 2017-05-26 RX ORDER — FLUTICASONE PROPIONATE 50 MCG
1 SPRAY, SUSPENSION (ML) NASAL DAILY
Status: DISCONTINUED | OUTPATIENT
Start: 2017-05-27 | End: 2017-05-29 | Stop reason: HOSPADM

## 2017-05-26 RX ORDER — CHOLECALCIFEROL (VITAMIN D3) 25 MCG
1000 TABLET ORAL DAILY
Status: DISCONTINUED | OUTPATIENT
Start: 2017-05-27 | End: 2017-05-29 | Stop reason: HOSPADM

## 2017-05-26 RX ORDER — TAMSULOSIN HYDROCHLORIDE 0.4 MG/1
0.4 CAPSULE ORAL DAILY
Status: DISCONTINUED | OUTPATIENT
Start: 2017-05-27 | End: 2017-05-29 | Stop reason: HOSPADM

## 2017-05-26 RX ORDER — CYCLOBENZAPRINE HCL 10 MG
10 TABLET ORAL 3 TIMES DAILY PRN
Status: DISCONTINUED | OUTPATIENT
Start: 2017-05-26 | End: 2017-05-29 | Stop reason: HOSPADM

## 2017-05-26 RX ORDER — ATORVASTATIN CALCIUM 40 MG/1
40 TABLET, FILM COATED ORAL DAILY
Status: DISCONTINUED | OUTPATIENT
Start: 2017-05-27 | End: 2017-05-29 | Stop reason: HOSPADM

## 2017-05-26 RX ORDER — FUROSEMIDE 40 MG/1
40 TABLET ORAL DAILY
Status: DISCONTINUED | OUTPATIENT
Start: 2017-05-27 | End: 2017-05-29 | Stop reason: HOSPADM

## 2017-05-26 RX ORDER — IPRATROPIUM BROMIDE 0.5 MG/2.5ML
500 SOLUTION RESPIRATORY (INHALATION) 4 TIMES DAILY
Status: DISCONTINUED | OUTPATIENT
Start: 2017-05-27 | End: 2017-05-27

## 2017-05-26 RX ORDER — FOLIC ACID 1 MG/1
1 TABLET ORAL DAILY
Status: DISCONTINUED | OUTPATIENT
Start: 2017-05-27 | End: 2017-05-29 | Stop reason: HOSPADM

## 2017-05-26 RX ORDER — FAMOTIDINE 20 MG/1
20 TABLET, FILM COATED ORAL 2 TIMES DAILY
Status: DISCONTINUED | OUTPATIENT
Start: 2017-05-26 | End: 2017-05-29 | Stop reason: HOSPADM

## 2017-05-26 RX ADMIN — ALBUTEROL SULFATE 2.5 MG: 2.5 SOLUTION RESPIRATORY (INHALATION) at 11:05

## 2017-05-26 RX ADMIN — AMIODARONE HYDROCHLORIDE 200 MG: 200 TABLET ORAL at 10:05

## 2017-05-26 RX ADMIN — IPRATROPIUM BROMIDE 500 MCG: 0.5 SOLUTION RESPIRATORY (INHALATION) at 11:05

## 2017-05-26 RX ADMIN — FAMOTIDINE 20 MG: 20 TABLET, FILM COATED ORAL at 10:05

## 2017-05-26 RX ADMIN — SODIUM CHLORIDE, PRESERVATIVE FREE 3 ML: 5 INJECTION INTRAVENOUS at 10:05

## 2017-05-27 LAB
ALBUMIN SERPL BCP-MCNC: 3.3 G/DL
ALP SERPL-CCNC: 82 U/L
ALT SERPL W/O P-5'-P-CCNC: 26 U/L
ANION GAP SERPL CALC-SCNC: 11 MMOL/L
AST SERPL-CCNC: 20 U/L
BASOPHILS # BLD AUTO: 0.03 K/UL
BASOPHILS NFR BLD: 0.6 %
BILIRUB SERPL-MCNC: 0.4 MG/DL
BUN SERPL-MCNC: 15 MG/DL
CALCIUM SERPL-MCNC: 8.9 MG/DL
CHLORIDE SERPL-SCNC: 103 MMOL/L
CHOLEST/HDLC SERPL: 4 {RATIO}
CO2 SERPL-SCNC: 28 MMOL/L
CREAT SERPL-MCNC: 1 MG/DL
DIFFERENTIAL METHOD: ABNORMAL
EOSINOPHIL # BLD AUTO: 0.2 K/UL
EOSINOPHIL NFR BLD: 4 %
ERYTHROCYTE [DISTWIDTH] IN BLOOD BY AUTOMATED COUNT: 13.6 %
ERYTHROCYTE [SEDIMENTATION RATE] IN BLOOD BY WESTERGREN METHOD: 45 MM/HR
EST. GFR  (AFRICAN AMERICAN): >60 ML/MIN/1.73 M^2
EST. GFR  (NON AFRICAN AMERICAN): >60 ML/MIN/1.73 M^2
ESTIMATED AVG GLUCOSE: 143 MG/DL
GLUCOSE SERPL-MCNC: 136 MG/DL
HBA1C MFR BLD HPLC: 6.6 %
HCT VFR BLD AUTO: 40 %
HDL/CHOLESTEROL RATIO: 25.1 %
HDLC SERPL-MCNC: 167 MG/DL
HDLC SERPL-MCNC: 42 MG/DL
HGB BLD-MCNC: 13.1 G/DL
LDLC SERPL CALC-MCNC: 107 MG/DL
LYMPHOCYTES # BLD AUTO: 1.5 K/UL
LYMPHOCYTES NFR BLD: 29.6 %
MCH RBC QN AUTO: 30.2 PG
MCHC RBC AUTO-ENTMCNC: 32.8 %
MCV RBC AUTO: 92 FL
MONOCYTES # BLD AUTO: 0.5 K/UL
MONOCYTES NFR BLD: 9.5 %
NEUTROPHILS # BLD AUTO: 2.8 K/UL
NEUTROPHILS NFR BLD: 56.1 %
NONHDLC SERPL-MCNC: 125 MG/DL
PLATELET # BLD AUTO: 242 K/UL
PMV BLD AUTO: 9.3 FL
POCT GLUCOSE: 149 MG/DL (ref 70–110)
POCT GLUCOSE: 210 MG/DL (ref 70–110)
POCT GLUCOSE: 221 MG/DL (ref 70–110)
POCT GLUCOSE: 242 MG/DL (ref 70–110)
POTASSIUM SERPL-SCNC: 3.3 MMOL/L
PROT SERPL-MCNC: 6.8 G/DL
RBC # BLD AUTO: 4.34 M/UL
RPR SER QL: NORMAL
SODIUM SERPL-SCNC: 142 MMOL/L
T4 FREE SERPL-MCNC: 1.24 NG/DL
TRIGL SERPL-MCNC: 90 MG/DL
TSH SERPL DL<=0.005 MIU/L-ACNC: 5.37 UIU/ML
VIT B12 SERPL-MCNC: 358 PG/ML
WBC # BLD AUTO: 5.03 K/UL

## 2017-05-27 PROCEDURE — 94640 AIRWAY INHALATION TREATMENT: CPT

## 2017-05-27 PROCEDURE — G8997 SWALLOW GOAL STATUS: HCPCS | Mod: CH

## 2017-05-27 PROCEDURE — 11000001 HC ACUTE MED/SURG PRIVATE ROOM

## 2017-05-27 PROCEDURE — G8996 SWALLOW CURRENT STATUS: HCPCS | Mod: CH

## 2017-05-27 PROCEDURE — 97166 OT EVAL MOD COMPLEX 45 MIN: CPT

## 2017-05-27 PROCEDURE — 25000003 PHARM REV CODE 250: Performed by: STUDENT IN AN ORGANIZED HEALTH CARE EDUCATION/TRAINING PROGRAM

## 2017-05-27 PROCEDURE — 99900039 HC SLP GENERIC THERAPY SCREENING (STAT)

## 2017-05-27 PROCEDURE — 99900035 HC TECH TIME PER 15 MIN (STAT)

## 2017-05-27 PROCEDURE — 80053 COMPREHEN METABOLIC PANEL: CPT

## 2017-05-27 PROCEDURE — 97535 SELF CARE MNGMENT TRAINING: CPT

## 2017-05-27 PROCEDURE — G8998 SWALLOW D/C STATUS: HCPCS | Mod: CH

## 2017-05-27 PROCEDURE — 25000242 PHARM REV CODE 250 ALT 637 W/ HCPCS: Performed by: FAMILY MEDICINE

## 2017-05-27 PROCEDURE — 92610 EVALUATE SWALLOWING FUNCTION: CPT

## 2017-05-27 PROCEDURE — 25000003 PHARM REV CODE 250: Performed by: FAMILY MEDICINE

## 2017-05-27 PROCEDURE — 94761 N-INVAS EAR/PLS OXIMETRY MLT: CPT

## 2017-05-27 PROCEDURE — 63600175 PHARM REV CODE 636 W HCPCS: Performed by: FAMILY MEDICINE

## 2017-05-27 PROCEDURE — 85025 COMPLETE CBC W/AUTO DIFF WBC: CPT

## 2017-05-27 PROCEDURE — G8988 SELF CARE GOAL STATUS: HCPCS | Mod: CI

## 2017-05-27 PROCEDURE — G8987 SELF CARE CURRENT STATUS: HCPCS | Mod: CJ

## 2017-05-27 PROCEDURE — 36415 COLL VENOUS BLD VENIPUNCTURE: CPT

## 2017-05-27 RX ORDER — INSULIN ASPART 100 [IU]/ML
0-5 INJECTION, SOLUTION INTRAVENOUS; SUBCUTANEOUS EVERY 6 HOURS PRN
Status: DISCONTINUED | OUTPATIENT
Start: 2017-05-27 | End: 2017-05-28

## 2017-05-27 RX ORDER — GLUCAGON 1 MG
1 KIT INJECTION
Status: DISCONTINUED | OUTPATIENT
Start: 2017-05-27 | End: 2017-05-29 | Stop reason: HOSPADM

## 2017-05-27 RX ORDER — ALBUTEROL SULFATE 0.83 MG/ML
2.5 SOLUTION RESPIRATORY (INHALATION)
Status: DISCONTINUED | OUTPATIENT
Start: 2017-05-27 | End: 2017-05-29 | Stop reason: HOSPADM

## 2017-05-27 RX ORDER — IPRATROPIUM BROMIDE 0.5 MG/2.5ML
500 SOLUTION RESPIRATORY (INHALATION)
Status: DISCONTINUED | OUTPATIENT
Start: 2017-05-27 | End: 2017-05-29 | Stop reason: HOSPADM

## 2017-05-27 RX ORDER — ACETAMINOPHEN 325 MG/1
650 TABLET ORAL EVERY 6 HOURS PRN
Status: DISCONTINUED | OUTPATIENT
Start: 2017-05-27 | End: 2017-05-29 | Stop reason: HOSPADM

## 2017-05-27 RX ADMIN — FOLIC ACID 1 MG: 1 TABLET ORAL at 09:05

## 2017-05-27 RX ADMIN — VITAMIN D, TAB 1000IU (100/BT) 1000 UNITS: 25 TAB at 08:05

## 2017-05-27 RX ADMIN — FLUTICASONE PROPIONATE 1 SPRAY: 50 SPRAY, METERED NASAL at 08:05

## 2017-05-27 RX ADMIN — ALBUTEROL SULFATE 2.5 MG: 2.5 SOLUTION RESPIRATORY (INHALATION) at 12:05

## 2017-05-27 RX ADMIN — INSULIN ASPART 2 UNITS: 100 INJECTION, SOLUTION INTRAVENOUS; SUBCUTANEOUS at 01:05

## 2017-05-27 RX ADMIN — IPRATROPIUM BROMIDE 500 MCG: 0.5 SOLUTION RESPIRATORY (INHALATION) at 12:05

## 2017-05-27 RX ADMIN — INSULIN ASPART 2 UNITS: 100 INJECTION, SOLUTION INTRAVENOUS; SUBCUTANEOUS at 05:05

## 2017-05-27 RX ADMIN — FUROSEMIDE 40 MG: 40 TABLET ORAL at 08:05

## 2017-05-27 RX ADMIN — TAMSULOSIN HYDROCHLORIDE 0.4 MG: 0.4 CAPSULE ORAL at 08:05

## 2017-05-27 RX ADMIN — FAMOTIDINE 20 MG: 20 TABLET, FILM COATED ORAL at 08:05

## 2017-05-27 RX ADMIN — FAMOTIDINE 20 MG: 20 TABLET, FILM COATED ORAL at 09:05

## 2017-05-27 RX ADMIN — SODIUM CHLORIDE, PRESERVATIVE FREE 3 ML: 5 INJECTION INTRAVENOUS at 09:05

## 2017-05-27 RX ADMIN — ALBUTEROL SULFATE 2.5 MG: 2.5 SOLUTION RESPIRATORY (INHALATION) at 08:05

## 2017-05-27 RX ADMIN — INSULIN ASPART 1 UNITS: 100 INJECTION, SOLUTION INTRAVENOUS; SUBCUTANEOUS at 09:05

## 2017-05-27 RX ADMIN — ALBUTEROL SULFATE 2.5 MG: 2.5 SOLUTION RESPIRATORY (INHALATION) at 04:05

## 2017-05-27 RX ADMIN — CETIRIZINE HYDROCHLORIDE 10 MG: 10 TABLET, FILM COATED ORAL at 08:05

## 2017-05-27 RX ADMIN — POTASSIUM CHLORIDE 10 MEQ: 10 TABLET, EXTENDED RELEASE ORAL at 08:05

## 2017-05-27 RX ADMIN — AMIODARONE HYDROCHLORIDE 200 MG: 200 TABLET ORAL at 09:05

## 2017-05-27 RX ADMIN — SODIUM CHLORIDE, PRESERVATIVE FREE 3 ML: 5 INJECTION INTRAVENOUS at 06:05

## 2017-05-27 RX ADMIN — AMIODARONE HYDROCHLORIDE 200 MG: 200 TABLET ORAL at 08:05

## 2017-05-27 RX ADMIN — ACETAMINOPHEN 650 MG: 325 TABLET ORAL at 07:05

## 2017-05-27 RX ADMIN — ATORVASTATIN CALCIUM 40 MG: 40 TABLET, FILM COATED ORAL at 08:05

## 2017-05-27 NOTE — PLAN OF CARE
Dr. 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 Results called to [facility].

## 2017-05-27 NOTE — PLAN OF CARE
Problem: Stroke (Ischemic) (Adult)  Goal: Signs and Symptoms of Listed Potential Problems Will be Absent, Minimized or Managed (Stroke)  Signs and symptoms of listed potential problems will be absent, minimized or managed by discharge/transition of care (reference Stroke (Ischemic) (Adult) CPG).  Pt in room with family. Returned from MRI and carotid US, awaiting results. POC discussed with pt and family, they verbalize understanding. Pt continues to have some weakness to RLE, cognitive function intact. VSS. Tolerating meds as ordered. Calling appropriately. Call light and phone within reach.

## 2017-05-27 NOTE — PLAN OF CARE
Pt's daughter requesting handicap bars for toilet or BSC to place over toilet    Pt's wife is on home hospice care, the nurse for wife also checks on pt    Both daughter live near by pt, next door/ across the street       05/27/17 7861   Discharge Assessment   Assessment Type Discharge Planning Assessment   Confirmed/corrected address and phone number on facesheet? Yes   Assessment information obtained from? Patient;Caregiver  (daughter: José Miguel Westbrook  [phone])   Expected Length of Stay (days) 2   Communicated expected length of stay with patient/caregiver yes   Prior to hospitilization cognitive status: Alert/Oriented  (hard of hearing)   Prior to hospitalization functional status: Independent   Current cognitive status: Alert/Oriented  (hard of hearing)   Current Functional Status: Needs Assistance   Arrived From home or self-care   Lives With spouse   Able to Return to Prior Arrangements yes   Is patient able to care for self after discharge? Unable to determine at this time (comments)   How many people do you have in your home that can help with your care after discharge? 1   Who are your caregiver(s) and their phone number(s)? daughters live next door/across street from pt José Miguel Westbrook [phone], Gabby Hollingsworth [phone]   Patient's perception of discharge disposition home or selfcare   Readmission Within The Last 30 Days no previous admission in last 30 days   Patient currently being followed by outpatient case management? No   Patient currently receives home health services? No   Does the patient currently use HME? Yes   Patient currently receives private duty nursing? No   Patient currently receives any other outside agency services? No   Equipment Currently Used at Home cane, quad;rollator;wheelchair;CPAP  (nebulizer)   Do you have any problems affording any of your prescribed medications? No   Is the patient taking medications as prescribed? yes   Do you have any financial concerns preventing you from  receiving the healthcare you need? No   Does the patient have transportation to healthcare appointments? Yes   Transportation Available family or friend will provide   On Dialysis? No   Does the patient receive services at the Coumadin Clinic? No   Are there any open cases? No   Discharge Plan A Home   Discharge Plan B Home with family   Patient/Family In Agreement With Plan yes     Unique Orona RN Transitional Navigator  [phone]

## 2017-05-27 NOTE — PLAN OF CARE
Problem: Occupational Therapy Goal  Goal: Occupational Therapy Goal  Goals to be met by: 6/27/2017     Patient will increase functional independence with ADLs by performing:    UE Dressing with Modified [place].  LE Dressing with Modified [place].  Grooming while standing at sink with Modified [place].  Toileting from bedside commode with Modified [place] for hygiene and clothing management.   Bathing from  shower chair/bench with Supervision.  Stand pivot transfers with Modified [place].  Toilet transfer to bedside commode with Modified [place].    Pt. Has SPC used for community ambulation & rollator for household ambulation.  Has built-in bench in shower with GB.  Needs BSC to increase independence & safety in toilet T/F.  To benefit from OT/PT in SNF.  Outcome: Ongoing (interventions implemented as appropriate)  Initial OT eval complete.  A&OX4.  Supine <->sit with supervision with use of side rail.  Donned/doffed socks seated EOB with supervision.  Ambulated with rollator bed->bathroom->sink with CGA for safety 2* impaired balance/stability.  Stand-pivot toilet <->rollator with CGA & use of grab bar.  Hand hygiene in stance sink side with supervision.  Ambulated sink -> bed CGA with no AD but balancing on furniture 2* impaired stability.  To benefit from continued OT to increase independence in self-care & functional T/F. OT to follow.

## 2017-05-27 NOTE — PLAN OF CARE
Problem: Patient Care Overview  Goal: Plan of Care Review  Outcome: Ongoing (interventions implemented as appropriate)  Pt seen for BSS eval and speech/cognitive screen this morning. Oral [facility] exam revealed lingual and labial strength and ROM WFL. Pt was observed to consumed thin liquids, puree, dental soft, and regular trials without demonstrating overt s/s of aspiration. Pt denied odynophagia, denied globus sensation. Pt oriented x3. Pt appropriately responded to yes/no and information questions. Pt accurately named several items in room. Recommendations: 1) Regular/thin. 2) Whole pills OK. Further ST tx not warranted 2/2 swallow/speech/cognition WFL this eval.   Unique Butcher CCC-SLP  5/27/2017

## 2017-05-27 NOTE — PLAN OF CARE
Problem: Patient Care Overview  Goal: Plan of Care Review  Outcome: Ongoing (interventions implemented as appropriate)  Reviewed plan of care with pt and family. Pt denies any pain. No true red alarms noted on tele. Safety measures are in place, bed low and in lock position, call light in reach, bed alarm is on, and family remains at the bedside. Pt verbalizes full understanding of their plan of care.

## 2017-05-27 NOTE — PT/OT/SLP EVAL
Occupational Therapy  Evaluation/Treatment    Aleksandr Schultz   MRN: [id number]   Admitting Diagnosis: CVA (cerebral vascular accident)    OT Date of Treatment: 05/27/17   OT Start Time: 1207  OT Stop Time: 1232  OT Total Time (min): 25 min    Billable Minutes:  Evaluation 10  Self Care/Home Management 15    Diagnosis: CVA (cerebral vascular accident)       Past Medical History:   Diagnosis Date    Aortic aneurysm     Asthma, chronic     Chronic kidney disease     CKD (chronic kidney disease)     COPD (chronic obstructive pulmonary disease)     Coronary artery disease     Diabetes mellitus     Glaucoma (increased eye pressure)     Hypertension     Peripheral vascular disease     Prostate cancer     Sleep apnea     cpap    Small bowel obstruction     Status post balloon angioplasty of pulmonary artery branches     1980    Stroke     1991      Past Surgical History:   Procedure Laterality Date    BACK SURGERY      x2 lumbar    CAROTID ARTERY ANGIOPLASTY      HERNIA REPAIR      PI OU      laser PI OU    SHOULDER SURGERY      rotator bilateral         General Precautions: Standard, fall, other (see comments), hearing impaired (only has L-hearing aide this day)  Orthopedic Precautions: N/A  Braces: N/A    Do you have any cultural, spiritual, Methodist conflicts, given your current situation?: None     Patient History:  Living Environment  Lives With: spouse  Living Arrangements: house  Transportation Available: car, family or friend will provide  Living Environment Comment: Lives with disabled spouse in  home, threshold entry, walk-in shower with built in bench & GB, standard toilet.  Wears glasses for reading, no dentures, is hearing impaired & only has L-hearing aide.  Daughters live next door & across the street checking in daily.  Pt. Sofia with all ADL & functional T/F.  Pt. cuts grass, grocery shops, simple meal prep, & drives.  Sitters come in for 3hrs in AM & PM to assist with care of disabled  spouse.  Pt. is alone with     Equipment Currently Used at Home: cane, quad, grab bar, rollator    Prior level of function:   Bed Mobility/Transfers: needs device  Grooming: independent  Bathing: needs device  Upper Body Dressing: independent  Lower Body Dressing: independent  Toileting: independent  Home Management Skills: independent  Driving License: Yes  Mode of Transportation: Car, Family, Friends  Occupation: Retired  IADL Comments: Pt. drives, performs all shopping, does simple meal prep, & light household chores.     Dominant hand: right    Subjective:  Communicated with nursing prior to session.  Pt. Agreeable to OT eval/treat  Chief Complaint: No complaints this day.  Patient/Family stated goals: Return home at [facility].    Pain/Comfort  Pain Rating 1: 0/10  Pain Rating Post-Intervention 1: 0/10    Objective:  Patient found with: telemetry    Cognitive Exam:  Oriented to: Person, Place, Time and Situation  Follows Commands/attention: Follows two-step commands  Communication: clear/fluent  Memory:  No Deficits noted  Safety awareness/insight to disability: intact  Coping skills/emotional control: Appropriate to situation    Visual/perceptual:  Intact; wears reading galsses.    Physical Exam:  Postural examination/scapula alignment: Rounded shoulder and Head forward  Skin integrity: Visible skin intact  Edema: None noted     Sensation:   Intact    Upper Extremity Range of Motion:  Right Upper Extremity: WFL;shoulder approx. 75% AROM 2* RCR per patient.  Left Upper Extremity: WFL shoulder approx. 75% AROM 2* RCR per patient.;    Upper Extremity Strength:  Right Upper Extremity: WFL  Left Upper Extremity: WFL   Strength: WFL    Fine motor coordination:   Intact    Gross motor coordination: WFL    Functional Mobility:  Bed Mobility:  Rolling/Turning to Left: Supervision  Scooting/Bridging: Supervision    Transfers:  Sit <> Stand Assistance: Contact Guard Assistance  Sit <> Stand Assistive Device: Other (see  comments) (rollator)  Toilet Transfer Technique: Stand Pivot  Toilet Transfer Assistance: Contact Guard Assistance  Toilet Transfer Assistive Device: Other (see comments), grab bar (rollator)    Functional Ambulation:  Ambulated with rollator bed->bathroom->sink with CGA for safety 2* impaired balance/stability.  Ambulated sink -> bed CGA with no AD but balancing on furniture 2* impaired stability.     Activities of Daily Living:     Feeding adaptive equipment: None     UE adaptive equipment: None  LE Dressing Level of Assistance: Supervision (socks only)  LE adaptive equipment: None  Grooming Position: Standing at sink (hand hygiene only)  Grooming Level of Assistance: Stand by assistance  Toileting Where Assessed: Toilet  Toileting Level of Assistance: Stand by assistance        Bathing adaptive equipment: no assistive device    Balance:   Static Sit: GOOD-: Takes MODERATE challenges from all directions but inconsistently  Dynamic Sit: FAIR+: Maintains balance through MINIMAL excursions of active trunk motion  Static Stand: FAIR+: Takes MINIMAL challenges from all directions  Dynamic stand: FAIR: Needs CONTACT GUARD during gait    Therapeutic Activities and Exercises:  Initial OT eval complete.  A&OX4.  Supine <->sit with supervision with use of side rail.  Donned/doffed socks seated EOB with supervision.  Ambulated with rollator bed->bathroom->sink with CGA for safety 2* impaired balance/stability.  Stand-pivot toilet <->rollator with CGA & use of grab bar.  Hand hygiene in stance sink side with supervision.  Ambulated sink -> bed CGA with no AD but balancing on furniture 2* impaired stability.  To benefit from continued OT to increase independence in self-care & functional T/F. OT to follow.    AM-PAC 6 CLICK ADL  How much help from another person does this patient currently need?  1 = Unable, Total/Dependent Assistance  2 = A lot, Maximum/Moderate Assistance  3 = A little, Minimum/Contact Guard/Supervision  4 =  "None, Modified [place]/Independent    Putting on and taking off regular lower body clothing? : 3  Bathing (including washing, rinsing, drying)?: 3  Toileting, which includes using toilet, bedpan, or urinal? : 3  Putting on and taking off regular upper body clothing?: 3  Taking care of personal grooming such as brushing teeth?: 4  Eating meals?: 4  Total Score: 20    AM-PAC Raw Score CMS "G-Code Modifier Level of Impairment Assistance   6 % Total / Unable   7 - 9 CM 80 - 100% Maximal Assist   10-14 CL 60 - 80% Moderate Assist   15 - 19 CK 40 - 60% Moderate Assist   20 - 22 CJ 20 - 40% Minimal Assist   23 CI 1-20% SBA / CGA   24 CH 0% Independent/ Mod I       Patient left supine with all lines intact, call button in reach, nursing notified and daughter present    Assessment:  Aleksandr Schultz is a 89 y.o. male with a medical diagnosis of CVA (cerebral vascular accident) and presents with deficits listed below decreasing independence in self-care & functional T/F.    Rehab identified problem list/impairments: Rehab identified problem list/impairments: weakness, impaired functional mobilty, gait instability, impaired balance, impaired self care skills, impaired endurance, decreased ROM    Rehab potential is good.    Activity tolerance: Good    Discharge recommendations: Discharge Facility/Level Of Care Needs: nursing facility, skilled     Barriers to discharge: Barriers to Discharge: None    Equipment recommendations: bedside commode     GOALS:    Occupational Therapy Goals        Problem: Occupational Therapy Goal    Goal Priority Disciplines Outcome Interventions   Occupational Therapy Goal     OT, PT/OT Ongoing (interventions implemented as appropriate)    Description:  Goals to be met by: 6/27/2017     Patient will increase functional independence with ADLs by performing:    UE Dressing with Modified [place].  LE Dressing with Modified [place].  Grooming while standing at sink with Modified " [place].  Toileting from bedside commode with Modified [place] for hygiene and clothing management.   Bathing from  shower chair/bench with Supervision.  Stand pivot transfers with Modified [place].  Toilet transfer to bedside commode with Modified [place].    Pt. Has SPC used for community ambulation & rollator for household ambulation.  Has built-in bench in shower with GB.  Needs BSC to increase independence & safety in toilet T/F.  To benefit from OT/PT in SNF.                    PLAN:  Patient to be seen 5 x/week to address the above listed problems via self-care/home management, therapeutic activities, therapeutic exercises  Plan of Care expires: 06/27/17  Plan of Care reviewed with: patient, daughter    OT G-codes  Functional Assessment Tool Used: [facility]  Score: 20  Functional Limitation: Self care  Self Care Current Status (): CJ  Self Care Goal Status (): TANIA Cameron OT  05/27/2017

## 2017-05-27 NOTE — H&P
History & Physical  [facility]    Date of Admit  5/27/2017    SUBJECTIVE:     Chief Complaint: right sided weakness and numbness     History of Present Illness:  Patient is a 89 y.o. male who presents with complaints of right sided numbness and weakness starting at 5pm yesterday.  Patient denied impaired speech and trouble swallowing at this time.  No additional complaints. Patient was transferred to us from [facility] ED for MRI and stroke workup.        Review of patient's allergies indicates:   Allergen Reactions    Iodinated contrast media - oral and iv dye      Past Medical History:   Diagnosis Date    Aortic aneurysm     Asthma, chronic     Chronic kidney disease     CKD (chronic kidney disease)     COPD (chronic obstructive pulmonary disease)     Coronary artery disease     Diabetes mellitus     Glaucoma (increased eye pressure)     Hypertension     Peripheral vascular disease     Prostate cancer     Sleep apnea     cpap    Small bowel obstruction     Status post balloon angioplasty of pulmonary artery branches     1980    Stroke     1991     Past Surgical History:   Procedure Laterality Date    BACK SURGERY      x2 lumbar    CAROTID ARTERY ANGIOPLASTY      HERNIA REPAIR      PI OU      laser PI OU    SHOULDER SURGERY      rotator bilateral     Family History   Problem Relation Age of Onset    Heart attack Father     Heart disease Father     Prostate cancer Neg Hx     Kidney disease Neg Hx     Heart failure Neg Hx     Hyperlipidemia Neg Hx     Stroke Neg Hx     Hypertension Neg Hx      Social History   Substance Use Topics    Smoking status: Former Smoker     Types: Cigarettes     Quit date: 10/11/1977    Smokeless tobacco: Not on file    Alcohol use Yes      Comment: social      No current facility-administered medications on file prior to encounter.      Current Outpatient Prescriptions on File Prior to Encounter   Medication Sig    albuterol (PROVENTIL) 2.5 mg  /3 mL (0.083 %) nebulizer solution Take 2.5 mg by nebulization 4 (four) times daily as needed.     amiodarone (PACERONE) 200 MG Tab Take 1 tablet (200 mg total) by mouth 2 (two) times daily.    amlodipine (NORVASC) 10 MG tablet TAKE 1 TABLET ONE TIME DAILY    apixaban 2.5 mg Tab Take 1 tablet (2.5 mg total) by mouth 2 (two) times daily.    aspirin (ECOTRIN) 81 MG EC tablet Take 81 mg by mouth once daily.      atorvastatin (LIPITOR) 40 MG tablet Take 40 mg by mouth once daily.    cinnamon bark (CINNAMON) 500 mg capsule Take 500 mg by mouth once daily.    cyclobenzaprine (FLEXERIL) 10 MG tablet Take 10 mg by mouth 3 (three) times daily as needed.      fish oil-omega-3 fatty acids 300-1,000 mg capsule Take 2 g by mouth once daily.      FLAXSEED OIL ORAL Take 1 tablet by mouth once daily.     folic acid (FOLVITE) 1 MG tablet Take 1 mg by mouth once daily.    furosemide (LASIX) 40 MG tablet TAKE 1 TABLET ONE TIME DAILY    garlic 1,000 mg Cap Take 1 tablet by mouth 2 (two) times daily.     glimepiride (AMARYL) 4 MG tablet Take 4 mg by mouth daily with breakfast. Pt taking one pill twice a day.    ipratropium (ATROVENT) 0.02 % nebulizer solution Take 500 mcg by nebulization 4 (four) times daily. PRN    lisinopril 10 MG tablet Take 1 tablet (10 mg total) by mouth once daily.    loratadine 10 mg Cap Take 1 tablet by mouth once daily.     metformin (GLUCOPHAGE) 500 MG tablet Take 500 mg by mouth daily with breakfast.    mometasone (NASONEX) 50 mcg/actuation nasal spray 2 sprays by Nasal route daily as needed.     nitroGLYCERIN (NITROSTAT) 0.4 MG SL tablet Place 0.4 mg under the tongue every 5 (five) minutes as needed (PRN).     pindolol (VISKEN) 10 MG tablet Take 1 tablet (10 mg total) by mouth 2 (two) times daily.    potassium chloride (MICRO-K) 10 MEQ CpSR Take 1 capsule (10 mEq total) by mouth once daily.    ranitidine (ZANTAC) 150 MG tablet Take 150 mg by mouth 2 (two) times daily.      tamsulosin  (FLOMAX) 0.4 mg Cp24 Take 0.4 mg by mouth once daily.    vitamin D 1000 units Tab Take 1,000 Units by mouth once daily.       Review of Systems:  As per Subjective    OBJECTIVE:     Vital Signs (Most Recent)  Temp: 98.5 °F (36.9 °C) (05/27/17 1200)  Pulse: 61 (05/27/17 1200)  Resp: 18 (05/27/17 1200)  BP: (!) 161/70 (05/27/17 1200)  SpO2: 96 % (05/27/17 0842)    Vital Signs Range (Last 24H):  Temp:  [97.7 °F (36.5 °C)-98.5 °F (36.9 °C)]   Pulse:  [56-77]   Resp:  [16-20]   BP: (161-204)/(70-85)   SpO2:  [96 %]     Body mass index is 27.22 kg/m².    I & O (Last 24H):    Intake/Output Summary (Last 24 hours) at 05/27/17 1217  Last data filed at 05/27/17 0700   Gross per 24 hour   Intake                0 ml   Output                0 ml   Net                0 ml     Wt Readings from Last 3 Encounters:   05/27/17 78.8 kg (173 lb 12.8 oz)   05/09/17 76.4 kg (168 lb 6.9 oz)   05/02/17 82.6 kg (182 lb 1.6 oz)       Current Diet Order   Procedures    Diet NPO     No Food intake until passes CLIFF or evaluated by Speech.        Physical Exam:  GEN:  NAD  HEENT:  JUANITO PERRL OP Clear  CV:  RRR no m/r/g  RESP:  CTAB no w/r/r  ABD:  Soft NT/ND +BS  NEURO:  4/5 right upper and lower extremity strength.  CN II - XII intact.  No additional focal deficits.      Laboratory:  LABS  CBC    Recent Labs  Lab 05/27/17 0423   WBC 5.03   RBC 4.34*   HGB 13.1*   HCT 40.0      MCV 92   MCH 30.2   MCHC 32.8     BMP    Recent Labs  Lab 05/27/17  0423      K 3.3*      CO2 28   BUN 15   CREATININE 1.0   *       Recent Labs  Lab 05/27/17 0423   CALCIUM 8.9       POCT-Glucose  POCT Glucose   Date Value Ref Range Status   05/27/2017 242 (H) 70 - 110 mg/dL Final   05/27/2017 149 (H) 70 - 110 mg/dL Final   05/26/2017 129 (H) 70 - 110 mg/dL Final     LFT    Recent Labs  Lab 05/27/17  0423   PROT 6.8   ALBUMIN 3.3*   BILITOT 0.4   AST 20   ALKPHOS 82   ALT 26     LAST HbA1c  Lab Results   Component Value Date    HGBA1C 6.6 (H)  05/26/2017       EKG - Sinus bradycardia with 1st degree AV block, Left anterior fascicular block, inferior infarct, age undetermined.    Imaging Results    None       CT head w/o contrast from [facility]  - Moderate to severe periventricular small vessel ischemic change with a tiny foccus of high attenuation in the left frontal precentral white matter.  A tiny focus of hemorrhage or dystrophic calcification is a diagnostic consideration.  No obvious mass effect or acute edema at this time.    -Complete opacification of the right maxillary sinus by what appears to be a soft tissue mass which is bulging into the right nasal cavity  -Severe intracranial arterial atherosclerosis  -Atrophy    ASSESSMENT/PLAN:     Aleksandr Schultz is a 89 y.o. male who  has a past medical history of Aortic aneurysm; Asthma, chronic; Chronic kidney disease; CKD (chronic kidney disease); COPD (chronic obstructive pulmonary disease); Coronary artery disease; Diabetes mellitus; Glaucoma (increased eye pressure); Hypertension; Peripheral vascular disease; Prostate cancer; Sleep apnea; Small bowel obstruction; Status post balloon angioplasty of pulmonary artery branches; and Stroke. presents with CVA (cerebral vascular accident)    CVA  -patient transferred from [facility] for stroke workup and MRI  -will get MRI brain and carotid US  -2D Echo   -patient was out of tPA window when he arrived to us  -neuro consult  -PT/OT/ST  -will restart home medications    Plan: Admit to [facility] - Attending Dr. Brandon    Dispo: Pending Clinical improvment  Plan discussed with Staff      Arturo Neal Jr., MD  [facility] HO-2  05/27/2017 4:32 AM

## 2017-05-27 NOTE — PT/OT/SLP PROGRESS
Physical Therapy      Aleksandr RAHEEM Schultz  MRN: [id number]    Patient not seen today secondary to off of floor in medical testing on both attempts made to perf evaluation. Will follow-up as able.    Nelli Farias, PT   5/27/2017

## 2017-05-27 NOTE — PT/OT/SLP EVAL
"Speech Language Pathology  Evaluation    Aleksandr Schultz   MRN: [id number]   Admitting Diagnosis: <principal problem not specified>    Diet recommendations: Solid Diet Level: Regular  Liquid Diet Level: Thin HOB to 90 degrees and Remain upright 30 minutes post meal    SLP Treatment Date: 05/27/17  Speech Start Time: 1130     Speech Stop Time: 1145     Speech Total (min): 15 min       TREATMENT BILLABLE MINUTES:  Eval Swallow and Oral Function 15    Diagnosis: <principal problem not specified>  Right leg weakness    Past Medical History:   Diagnosis Date    Aortic aneurysm     Asthma, chronic     Chronic kidney disease     CKD (chronic kidney disease)     COPD (chronic obstructive pulmonary disease)     Coronary artery disease     Diabetes mellitus     Glaucoma (increased eye pressure)     Hypertension     Peripheral vascular disease     Prostate cancer     Sleep apnea     cpap    Small bowel obstruction     Status post balloon angioplasty of pulmonary artery branches     1980    Stroke     1991     Past Surgical History:   Procedure Laterality Date    BACK SURGERY      x2 lumbar    CAROTID ARTERY ANGIOPLASTY      HERNIA REPAIR      PI OU      laser PI OU    SHOULDER SURGERY      rotator bilateral       Has the patient been evaluated by SLP for swallowing? : Yes  Keep patient NPO?: No   General Precautions: Standard,            Social Hx: Patient lives with wife at home. Wife is bedbound. Sitter helps 6 hours daily..    Prior diet: regular/thin.    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    Subjective:  Pt is 89 yo male with PMH of CVA (1991), DM2, CAD, and HTN who presented to [facility] on 5/26/17 with primary complaint of right leg weakness. Per pt report, pt woke up at 5:00AM on 5/26/17 and collapsed on way to bathroom when his "leg gave out." Pt stated he did suffer a CVA in August of 1991 and received ST targeting speech/language. Pt stated his speech/language deficits " resolved after therapy. Pt denied having had CVA or TIA since that time.   Patient goals: to go home.    Pain/Comfort  Pain Rating 1: 0/10    Objective:        Oral Musculature Evaluation  Oral Musculature: WFL  Dentition: present and adequate  Mucosal Quality: good  Mandibular Strength and Mobility: WFL  Oral Labial Strength and Mobility: WFL  Lingual Strength and Mobility: WFL  Buccal Strength and Mobility: WFL  Volitional Cough: strong  Volitional Swallow: WFL  Voice Prior to PO Intake: clear vocal quality     Bedside Swallow Eval:  Consistencies Assessed: Thin liquids 4 oz, Puree 2 oz, Soft solids 2 oz and Solids 2 crackers  Oral Phase: WFL  Pharyngeal Phase: no overt clinical  signs/symptoms of aspiration    Additional Treatment:      FIM:  Social Interaction: 7 Complete [place]--The patient interacts appropriately with staff, other patients, and family members (e.g., controls temper, accepts criticism, is aware that words and actions have an impact on others), and does not require medication for         Comprehension: 6 Modified [place]--In most situations, the patient understands readily or with only mild dificulty complex or abstract directions and conversation.  The patient does not require prompting, though (s)he may require a hearing or visual aid, other x   Expression: 6 Modified [place]--In most situations, the patient expresses complex or abstract ideas relatively clearly or with only amber difficulty.  The patient does not need any prompting, but (s)he may require an augmentative communication device or system.          Assessment:  Aleksandr Schultz is a 89 y.o. male with a medical diagnosis of <principal problem not specified> and presents with oropharyngeal and speech/cognitive skills WFL this evaluation. Pt seen for BSS eval and speech/cognitive screen this morning. Oral [facility] exam revealed lingual and labial strength and ROM WFL. Pt was observed to consumed thin liquids, puree, dental  soft, and regular trials without demonstrating overt s/s of aspiration. Pt denied odynophagia, denied globus sensation. Pt oriented x3. Pt appropriately responded to yes/no and information questions. Pt accurately named several items in room. Recommendations: 1) Regular/thin. 2) Whole pills OK. Further ST tx not warranted 2/2 swallow/speech/cognition WFL this eval.     Do you have any cultural, spiritual, Hindu conflicts, given your current situation?: none     Discharge recommendations:       Goals:   ST eval only       SLP Goals     Not on file                 Plan:   Patient to be seen     Plan of Care expires:    Plan of Care reviewed with: patient, daughter  SLP Follow-up?: No              Unique Butcher CCC-SLP  05/27/2017

## 2017-05-27 NOTE — PLAN OF CARE
Problem: Patient Care Overview  Goal: Plan of Care Review  Sats 96% RA; tolerating well; will continue to monitor.

## 2017-05-28 LAB
ALBUMIN SERPL BCP-MCNC: 3.2 G/DL
ALP SERPL-CCNC: 85 U/L
ALT SERPL W/O P-5'-P-CCNC: 22 U/L
ANION GAP SERPL CALC-SCNC: 10 MMOL/L
AST SERPL-CCNC: 16 U/L
BASOPHILS # BLD AUTO: 0.02 K/UL
BASOPHILS NFR BLD: 0.3 %
BILIRUB SERPL-MCNC: 0.3 MG/DL
BUN SERPL-MCNC: 20 MG/DL
CALCIUM SERPL-MCNC: 8.9 MG/DL
CHLORIDE SERPL-SCNC: 105 MMOL/L
CO2 SERPL-SCNC: 26 MMOL/L
CREAT SERPL-MCNC: 1.1 MG/DL
DIFFERENTIAL METHOD: ABNORMAL
EOSINOPHIL # BLD AUTO: 0.2 K/UL
EOSINOPHIL NFR BLD: 3.7 %
ERYTHROCYTE [DISTWIDTH] IN BLOOD BY AUTOMATED COUNT: 13.7 %
EST. GFR  (AFRICAN AMERICAN): >60 ML/MIN/1.73 M^2
EST. GFR  (NON AFRICAN AMERICAN): 59 ML/MIN/1.73 M^2
GLUCOSE SERPL-MCNC: 151 MG/DL
HCT VFR BLD AUTO: 39 %
HGB BLD-MCNC: 12.9 G/DL
LYMPHOCYTES # BLD AUTO: 1.8 K/UL
LYMPHOCYTES NFR BLD: 29.5 %
MCH RBC QN AUTO: 30.2 PG
MCHC RBC AUTO-ENTMCNC: 33.1 %
MCV RBC AUTO: 91 FL
MONOCYTES # BLD AUTO: 0.8 K/UL
MONOCYTES NFR BLD: 12.5 %
NEUTROPHILS # BLD AUTO: 3.3 K/UL
NEUTROPHILS NFR BLD: 53.8 %
PLATELET # BLD AUTO: 237 K/UL
PMV BLD AUTO: 9 FL
POCT GLUCOSE: 168 MG/DL (ref 70–110)
POCT GLUCOSE: 177 MG/DL (ref 70–110)
POCT GLUCOSE: 199 MG/DL (ref 70–110)
POCT GLUCOSE: 251 MG/DL (ref 70–110)
POTASSIUM SERPL-SCNC: 3.4 MMOL/L
PROT SERPL-MCNC: 6.5 G/DL
RBC # BLD AUTO: 4.27 M/UL
SODIUM SERPL-SCNC: 141 MMOL/L
WBC # BLD AUTO: 6.16 K/UL

## 2017-05-28 PROCEDURE — 85025 COMPLETE CBC W/AUTO DIFF WBC: CPT

## 2017-05-28 PROCEDURE — 97161 PT EVAL LOW COMPLEX 20 MIN: CPT

## 2017-05-28 PROCEDURE — 97116 GAIT TRAINING THERAPY: CPT

## 2017-05-28 PROCEDURE — 25000003 PHARM REV CODE 250: Performed by: FAMILY MEDICINE

## 2017-05-28 PROCEDURE — 94660 CPAP INITIATION&MGMT: CPT

## 2017-05-28 PROCEDURE — 36415 COLL VENOUS BLD VENIPUNCTURE: CPT

## 2017-05-28 PROCEDURE — 25000242 PHARM REV CODE 250 ALT 637 W/ HCPCS: Performed by: FAMILY MEDICINE

## 2017-05-28 PROCEDURE — 63600175 PHARM REV CODE 636 W HCPCS: Performed by: FAMILY MEDICINE

## 2017-05-28 PROCEDURE — 97530 THERAPEUTIC ACTIVITIES: CPT

## 2017-05-28 PROCEDURE — 11000001 HC ACUTE MED/SURG PRIVATE ROOM

## 2017-05-28 PROCEDURE — 80053 COMPREHEN METABOLIC PANEL: CPT

## 2017-05-28 PROCEDURE — 94640 AIRWAY INHALATION TREATMENT: CPT

## 2017-05-28 PROCEDURE — G8978 MOBILITY CURRENT STATUS: HCPCS | Mod: CI

## 2017-05-28 PROCEDURE — 94761 N-INVAS EAR/PLS OXIMETRY MLT: CPT

## 2017-05-28 PROCEDURE — G8979 MOBILITY GOAL STATUS: HCPCS | Mod: CH

## 2017-05-28 RX ORDER — ADHESIVE BANDAGE
30 BANDAGE TOPICAL DAILY PRN
Status: DISCONTINUED | OUTPATIENT
Start: 2017-05-28 | End: 2017-05-29 | Stop reason: HOSPADM

## 2017-05-28 RX ORDER — NIFEDIPINE 30 MG/1
30 TABLET, EXTENDED RELEASE ORAL DAILY
Status: DISCONTINUED | OUTPATIENT
Start: 2017-05-29 | End: 2017-05-28

## 2017-05-28 RX ORDER — AMLODIPINE BESYLATE 5 MG/1
10 TABLET ORAL DAILY
Status: DISCONTINUED | OUTPATIENT
Start: 2017-05-28 | End: 2017-05-29 | Stop reason: HOSPADM

## 2017-05-28 RX ORDER — NIFEDIPINE 30 MG/1
30 TABLET, EXTENDED RELEASE ORAL DAILY
Status: DISCONTINUED | OUTPATIENT
Start: 2017-05-28 | End: 2017-05-28

## 2017-05-28 RX ORDER — HYDRALAZINE HYDROCHLORIDE 20 MG/ML
10 INJECTION INTRAMUSCULAR; INTRAVENOUS EVERY 8 HOURS PRN
Status: DISCONTINUED | OUTPATIENT
Start: 2017-05-28 | End: 2017-05-28

## 2017-05-28 RX ORDER — INSULIN ASPART 100 [IU]/ML
1-10 INJECTION, SOLUTION INTRAVENOUS; SUBCUTANEOUS
Status: DISCONTINUED | OUTPATIENT
Start: 2017-05-28 | End: 2017-05-29 | Stop reason: HOSPADM

## 2017-05-28 RX ORDER — LABETALOL HYDROCHLORIDE 5 MG/ML
10 INJECTION, SOLUTION INTRAVENOUS EVERY 6 HOURS PRN
Status: DISCONTINUED | OUTPATIENT
Start: 2017-05-28 | End: 2017-05-29 | Stop reason: HOSPADM

## 2017-05-28 RX ORDER — LISINOPRIL 5 MG/1
10 TABLET ORAL DAILY
Status: DISCONTINUED | OUTPATIENT
Start: 2017-05-28 | End: 2017-05-29 | Stop reason: HOSPADM

## 2017-05-28 RX ADMIN — IPRATROPIUM BROMIDE 500 MCG: 0.5 SOLUTION RESPIRATORY (INHALATION) at 08:05

## 2017-05-28 RX ADMIN — SODIUM CHLORIDE, PRESERVATIVE FREE 3 ML: 5 INJECTION INTRAVENOUS at 05:05

## 2017-05-28 RX ADMIN — FLUTICASONE PROPIONATE 1 SPRAY: 50 SPRAY, METERED NASAL at 09:05

## 2017-05-28 RX ADMIN — APIXABAN 2.5 MG: 2.5 TABLET, FILM COATED ORAL at 08:05

## 2017-05-28 RX ADMIN — IPRATROPIUM BROMIDE 500 MCG: 0.5 SOLUTION RESPIRATORY (INHALATION) at 01:05

## 2017-05-28 RX ADMIN — AMIODARONE HYDROCHLORIDE 200 MG: 200 TABLET ORAL at 09:05

## 2017-05-28 RX ADMIN — IPRATROPIUM BROMIDE 500 MCG: 0.5 SOLUTION RESPIRATORY (INHALATION) at 04:05

## 2017-05-28 RX ADMIN — ALBUTEROL SULFATE 2.5 MG: 2.5 SOLUTION RESPIRATORY (INHALATION) at 08:05

## 2017-05-28 RX ADMIN — VITAMIN D, TAB 1000IU (100/BT) 1000 UNITS: 25 TAB at 09:05

## 2017-05-28 RX ADMIN — FOLIC ACID 1 MG: 1 TABLET ORAL at 09:05

## 2017-05-28 RX ADMIN — ALBUTEROL SULFATE 2.5 MG: 2.5 SOLUTION RESPIRATORY (INHALATION) at 01:05

## 2017-05-28 RX ADMIN — MAGNESIUM HYDROXIDE 2400 MG: 400 SUSPENSION ORAL at 11:05

## 2017-05-28 RX ADMIN — FAMOTIDINE 20 MG: 20 TABLET, FILM COATED ORAL at 08:05

## 2017-05-28 RX ADMIN — INSULIN ASPART 3 UNITS: 100 INJECTION, SOLUTION INTRAVENOUS; SUBCUTANEOUS at 01:05

## 2017-05-28 RX ADMIN — LABETALOL HYDROCHLORIDE 10 MG: 5 INJECTION INTRAVENOUS at 05:05

## 2017-05-28 RX ADMIN — SODIUM CHLORIDE, PRESERVATIVE FREE 3 ML: 5 INJECTION INTRAVENOUS at 09:05

## 2017-05-28 RX ADMIN — INSULIN ASPART 2 UNITS: 100 INJECTION, SOLUTION INTRAVENOUS; SUBCUTANEOUS at 05:05

## 2017-05-28 RX ADMIN — AMLODIPINE BESYLATE 10 MG: 5 TABLET ORAL at 05:05

## 2017-05-28 RX ADMIN — LISINOPRIL 10 MG: 5 TABLET ORAL at 02:05

## 2017-05-28 RX ADMIN — CETIRIZINE HYDROCHLORIDE 10 MG: 10 TABLET, FILM COATED ORAL at 09:05

## 2017-05-28 RX ADMIN — ATORVASTATIN CALCIUM 40 MG: 40 TABLET, FILM COATED ORAL at 09:05

## 2017-05-28 RX ADMIN — ALBUTEROL SULFATE 2.5 MG: 2.5 SOLUTION RESPIRATORY (INHALATION) at 04:05

## 2017-05-28 RX ADMIN — POTASSIUM CHLORIDE 10 MEQ: 10 TABLET, EXTENDED RELEASE ORAL at 09:05

## 2017-05-28 RX ADMIN — HYDRALAZINE HYDROCHLORIDE 10 MG: 20 INJECTION INTRAMUSCULAR; INTRAVENOUS at 03:05

## 2017-05-28 RX ADMIN — APIXABAN 2.5 MG: 2.5 TABLET, FILM COATED ORAL at 02:05

## 2017-05-28 RX ADMIN — SODIUM CHLORIDE, PRESERVATIVE FREE 3 ML: 5 INJECTION INTRAVENOUS at 01:05

## 2017-05-28 RX ADMIN — AMIODARONE HYDROCHLORIDE 200 MG: 200 TABLET ORAL at 08:05

## 2017-05-28 RX ADMIN — TAMSULOSIN HYDROCHLORIDE 0.4 MG: 0.4 CAPSULE ORAL at 09:05

## 2017-05-28 RX ADMIN — INSULIN ASPART 1 UNITS: 100 INJECTION, SOLUTION INTRAVENOUS; SUBCUTANEOUS at 10:05

## 2017-05-28 RX ADMIN — FUROSEMIDE 40 MG: 40 TABLET ORAL at 09:05

## 2017-05-28 RX ADMIN — FAMOTIDINE 20 MG: 20 TABLET, FILM COATED ORAL at 09:05

## 2017-05-28 NOTE — PLAN OF CARE
Problem: Physical Therapy Goal  Goal: Physical Therapy Goal  Goals to be met by: 17     Patient will increase functional independence with mobility by performin. Gait  x 200 feet with Modified [place] using rollator  2. Ascend/Descend curb step with Stand-by Assistance using Single-point Cane .  3. Lower extremity exercise program x15 reps per handout, with supervision    Outcome: Ongoing (interventions implemented as appropriate)  Initial PT evaluation completed. See full report in chart.

## 2017-05-28 NOTE — CONSULTS
[facility] Neurology Consult Note    Reason for Consult -   CVA    History of Present Illness -   Patient is a 89 y.o. male with past medical history of Aortic aneurysm; Asthma, chronic; Chronic kidney disease; CKD (chronic kidney disease); COPD (chronic obstructive pulmonary disease); Coronary artery disease; Diabetes mellitus; Glaucoma (increased eye pressure); Hypertension; Peripheral vascular disease; Prostate cancer; Sleep apnea; Small bowel obstruction; Status post balloon angioplasty of pulmonary artery branches; and Stroke who presents with complaints of right sided numbness and weakness starting at 5AM yesterday. He woke up dragging his right leg and feeling weak his right arm. He also reports numbness.  Patient denied impaired speech and trouble swallowing at this time, denies dizziness, facial droop or confusion. Patient was transferred to us from [facility] the [facility] ED for MRI and stroke workup. MRI showed acute left corona radiata stroke. Neurology consulted for recommendations.    Review of Systems -  Pertinent positive noted in HPI.    Past Medical History -  Social History - Former smoker, quit in 1982  Family History -  Allergies - NKDA  Home Neuro Medications -    Vitals -     Physical Exam -     Vitals:    05/28/17 0500 05/28/17 0536 05/28/17 0800 05/28/17 0858   BP:   (!) 184/78    BP Location:   Right arm    Patient Position:   Lying    BP Method:   Automatic    Pulse: (!) 58   61   Resp:   18 20   Temp:   98.6 °F (37 °C)    TempSrc:   Oral    SpO2:    95%   Weight:  80.2 kg (176 lb 12.8 oz)     Height:           General appearance: Well nourished, well developed, no acute distress.  Facial Expression: normal       Affect: full       Orientation to time & place:  Oriented to time, place, person and situation       Attention & concentration:  Normal attention span and concentration       Memory:  Recent and remote memory intact  Language: Spontaneous, fluent; able to repeat and name objects        Fund of  knowledge:  Aware of current events        Speech:  normal (not dysarthric)    Cranial nerves: normal visual acuity, visual fields full, pupils equal round and reactive, extraocular movements intact, facial sensation intact, face symmetrical, hearing decreased bilaterally, palate raises midline, shoulder shrug strength normal, tongue protrudes midline.    Musculoskeletal:  Muscle tone: all 4 extremities normal        Muscle Bulk: all 4 extremities normal        Muscle strength:  5/5 in all 4 extremities except for 4/5 proximal RLE.   No pronator drift  Sensation: Intact to light touch, pin prick. Decreased vibration in BL extremities       Deep tendon Reflexes: 2+ bilateral biceps, triceps, 1+ patella, 1+ankles    Cerebellar and Coordination:  Gait: Deferred        Finger-nose: no dysmetria            MOVEMENT DISORDERS FOCUSED EXAM    Abnormality of movement (bradykinesia, hyperkinesia) present? No    Tremor present?   No       Labs -    Imaging -       Assessment and Plan -   89 y.o. male with past medical history of Aortic aneurysm; Chronic kidney disease;  COPD; Coronary artery disease; Diabetes mellitus; Hypertension; Peripheral vascular disease; Prostate cancer; Sleep apnea; Small bowel obstruction, and Stroke admitted with right UE/LE numbness and weakness. No tPA candidate as pt presented out of window for treatment.    -MRI shows acute left corona radiata stroke.  -Carotid US:   1. Status post right carotid endarterectomy normal velocities.  2.  Atherosclerosis with no evidence of flow-limiting carotid stenosis greater than 50%.  -Pt currently on ASA and Eliquis (for A-Fib). Continue both.  -Lipid panel, B12 normal. A1C 6.6.  -Continue statin.  -Optimize BP and glucose control.  -PT/OT.  -F/U with Neurology post discharge.    Appreciate consult. Case discussed and seen with Dr Koehler.    Leona Loving MD  Neurology PGY-3

## 2017-05-28 NOTE — PLAN OF CARE
Problem: Diabetes, Type 2 (Adult)  Goal: Signs and Symptoms of Listed Potential Problems Will be Absent, Minimized or Managed (Diabetes, Type 2)  Signs and symptoms of listed potential problems will be absent, minimized or managed by discharge/transition of care (reference Diabetes, Type 2 (Adult) CPG).   Outcome: Ongoing (interventions implemented as appropriate)   05/28/17 1812   Diabetes, Type 2   Problems Assessed (Type 2 Diabetes) all   Problems Present (Type 2 Diabetes) hyperglycemia       Problem: Patient Care Overview  Goal: Plan of Care Review  Outcome: Ongoing (interventions implemented as appropriate)  Plan of care reviewed with pt.Pt verbalized understanding. Pt daughter at bedside. Pt on tele: first degree AV block, HR 50s-70s. No red alarms noted as of this time. Pt reported vision change to right eye this evening. MD aware. Safety measures maintained. Bed is in the lowest position and locked. Call bell is within reach. Instructed pt to call for assistance. Pt verbalized understanding. Will continue to monitor.     Problem: Fall Risk (Adult)  Goal: Identify Related Risk Factors and Signs and Symptoms  Related risk factors and signs and symptoms are identified upon initiation of Human Response Clinical Practice Guideline (CPG)    Outcome: Ongoing (interventions implemented as appropriate)   05/28/17 1812   Fall Risk   Related Risk Factors (Fall Risk) age-related changes;gait/mobility problems;impaired vision;environment unfamiliar     Goal: Absence of Falls  Patient will demonstrate the desired outcomes by discharge/transition of care.    Outcome: Ongoing (interventions implemented as appropriate)   05/28/17 1812   Fall Risk (Adult)   Absence of Falls making progress toward outcome       Problem: Stroke (Ischemic) (Adult)  Goal: Signs and Symptoms of Listed Potential Problems Will be Absent, Minimized or Managed (Stroke)  Signs and symptoms of listed potential problems will be absent, minimized or  managed by discharge/transition of care (reference Stroke (Ischemic) (Adult) CPG).   Outcome: Ongoing (interventions implemented as appropriate)   05/28/17 1812   Stroke (Ischemic)   Problems Assessed (Stroke (Ischemic)/TIA) all   Problems Present (Stroke (Ischemic)/TIA) other (see comments)

## 2017-05-28 NOTE — PT/OT/SLP EVAL
Physical Therapy  Evaluation    Aleksandr Schultz   MRN: [id number]   Admitting Diagnosis: CVA (cerebral vascular accident)    PT Received On: 05/28/17  PT Start Time: 0907     PT Stop Time: 0940    PT Total Time (min): 33 min       Billable Minutes:  Evaluation 13, Gait [id number] and Therapeutic Activity 10    Diagnosis: CVA (cerebral vascular accident)    Past Medical History:   Diagnosis Date    Aortic aneurysm     Asthma, chronic     Chronic kidney disease     CKD (chronic kidney disease)     COPD (chronic obstructive pulmonary disease)     Coronary artery disease     Diabetes mellitus     Glaucoma (increased eye pressure)     Hypertension     Peripheral vascular disease     Prostate cancer     Sleep apnea     cpap    Small bowel obstruction     Status post balloon angioplasty of pulmonary artery branches     1980    Stroke     1991      Past Surgical History:   Procedure Laterality Date    BACK SURGERY      x2 lumbar    CAROTID ARTERY ANGIOPLASTY      HERNIA REPAIR      PI OU      laser PI OU    SHOULDER SURGERY      rotator bilateral       General Precautions: Standard, fall, hearing impaired  Orthopedic Precautions: N/A   Braces:         Do you have any cultural, spiritual, Oriental orthodox conflicts, given your current situation?: none    Patient History:  Lives With: spouse  Living Arrangements: house  Transportation Available: car, family or friend will provide  Living Environment Comment: pt lives with spouse (on hospice care) in a [facility] with threshold LONG. Has walk in shower with built in bench seat and grab bars present. Mod(I) PLOF. Uses rollator inside house and cane for community. Daughters live nearby and check on him daily. Pt is able to drive.   Equipment Currently Used at Home: cane, straight, grab bar, rollator  DME owned (not currently used): none    Previous Level of Function:  Ambulation Skills: needs device  Transfer Skills: independent  ADL Skills: needs  device    Subjective:  Communicated with RN prior to session.  The primary encounter diagnosis was Cerebrovascular accident (CVA), unspecified mechanism. Diagnoses of CVA (cerebral vascular accident), MICHELLE on CPAP, and Type 2 diabetes, controlled, with neuropathy were also pertinent to this visit.  Chief Complaint: none stated  Patient goals: return home to PLOF    Pain/Comfort  Pain Rating 1: 0/10  Pain Rating Post-Intervention 1: 0/10      Objective:   Patient found with: telemetry, SCD     Cognitive Exam:  Oriented to: Person, Place, Time and Situation    Follows Commands/attention: Follows multistep  commands  Communication: clear/fluent  Safety awareness/insight to disability: intact    Physical Exam:  Postural examination/scapula alignment: Rounded shoulder and Head forward    Skin integrity: Visible skin intact  Edema: None noted     Sensation:   Intact to LT LLE. Impaired to LT RLE    Lower Extremity Range of Motion:  Right Lower Extremity: WFL  Left Lower Extremity: WFL    Lower Extremity Strength:  Right Lower Extremity: grossly 4-/5  Left Lower Extremity: grossly 4+/5     Fine motor coordination:  Intact    Gross motor coordination: WFL    Functional Mobility:  Bed Mobility:  Rolling/Turning to Left: Independent  Rolling/Turning Right: Independent  Scooting/Bridging: Independent  Supine to Sit: Independent  Sit to Supine: Independent    Transfers:  Sit <> Stand Assistance: Supervision  Sit <> Stand Assistive Device: 4 wheeled walker  Bed <> Chair Technique: Stand Pivot  Bed <> Chair Assistance: Modified Independent, Supervision  Bed <> Chair Assistive Device: 4 wheeled walker    Gait:   Gait Distance: X150 feet X2 trials.   Assistance 1: Supervision  Gait Assistive Device: Rollator  Gait Deviation(s): decreased miranda, decreased stride length, decreased toe-to-floor clearance      Balance:   Static Sit: NORMAL: No deviations seen in posture held statically  Dynamic Sit: GOOD+: Maintains balance through  MAXIMAL excursions of active trunk motion  Static Stand: GOOD-: Takes MODERATE challenges from all directions inconsistently  Dynamic stand: GOOD: Needs SUPERVISION only during gait and able to self right with moderate     Therapeutic Activities and Exercises:  Initial PT evaluation completed as documented.   Pt performed toileting and cleansing tasks without assist in bathroom in room.   Ambulation in room without AD with CGA  Standing at sink to wash face, brush teeth, and comb hair all with SBA for safety    AM-PAC 6 CLICK MOBILITY  How much help from another person does this patient currently need?   1 = Unable, Total/Dependent Assistance  2 = A lot, Maximum/Moderate Assistance  3 = A little, Minimum/Contact Guard/Supervision  4 = None, Modified [place]/Independent    Turning over in bed (including adjusting bedclothes, sheets and blankets)?: 4  Sitting down on and standing up from a chair with arms (e.g., wheelchair, bedside commode, etc.): 4  Moving from lying on back to sitting on the side of the bed?: 4  Moving to and from a bed to a chair (including a wheelchair)?: 4  Need to walk in hospital room?: 4  Climbing 3-5 steps with a railing?: 3  Total Score: 23     AM-PAC Raw Score CMS G-Code Modifier Level of Impairment Assistance   6 % Total / Unable   7 - 9 CM 80 - 100% Maximal Assist   10 - 14 CL 60 - 80% Moderate Assist   15 - 19 CK 40 - 60% Moderate Assist   20 - 22 CJ 20 - 40% Minimal Assist   23 CI 1-20% SBA / CGA   24 CH 0% Independent/ Mod I     Patient left seated at EOB with all lines intact, call button in reach, RN notified and daughter present.    Assessment:   Aleksandr Schultz is a 89 y.o. male with a medical diagnosis of CVA (cerebral vascular accident) and presents with R LE weakness and gait instability. Patient will continue to benefit from therapy services to address impairments and progress functional mobility as able.     Rehab identified problem list/impairments: Rehab  identified problem list/impairments: weakness, impaired endurance, impaired sensation, gait instability    Rehab potential is good.    Activity tolerance: Good    Discharge recommendations: Discharge Facility/Level Of Care Needs: outpatient PT (pt's daughter reports family will be able to drive patient to outpatient appointments)     Barriers to discharge: Barriers to Discharge: None    Equipment recommendations: Equipment Needed After Discharge: bedside commode     GOALS:    Physical Therapy Goals        Problem: Physical Therapy Goal    Goal Priority Disciplines Outcome Goal Variances Interventions   Physical Therapy Goal     PT/OT, PT Ongoing (interventions implemented as appropriate)     Description:  Goals to be met by: 17     Patient will increase functional independence with mobility by performin. Gait  x 200 feet with Modified [place] using rollator  2. Ascend/Descend curb step with Stand-by Assistance using Single-point Cane .  3. Lower extremity exercise program x15 reps per handout, with supervision                      PLAN:    Patient to be seen 6 x/week to address the above listed problems via gait training, therapeutic activities, therapeutic exercises, neuromuscular re-education  Plan of Care expires: 17  Plan of Care reviewed with: patient, daughter    Functional Assessment Tool Used: ampac  Score: 23  Functional Limitation: Mobility: Walking and moving around  Mobility: Walking and Moving Around Current Status (): CI  Mobility: Walking and Moving Around Goal Status (): SHIRLEY Short DPT  2017

## 2017-05-28 NOTE — PLAN OF CARE
Problem: Patient Care Overview  Goal: Plan of Care Review  Outcome: Ongoing (interventions implemented as appropriate)  Patient AAOx4 throughout night shift. No complaints of pain.Skin warm, dry, intact and normal color of ethnicity. Alarm on and audible. Bed locked and in lowest position. Patient oriented to call light and understands the need to use it before getting up by self. Telemetry monitor on and audible showing no red alarms Education given on diagnosis    Problem: Stroke (Ischemic) (Adult)  Goal: Signs and Symptoms of Listed Potential Problems Will be Absent, Minimized or Managed (Stroke)  Signs and symptoms of listed potential problems will be absent, minimized or managed by discharge/transition of care (reference Stroke (Ischemic) (Adult) CPG).   Outcome: Ongoing (interventions implemented as appropriate)  NeuroChecks performed every four hours without deficits or abnormalities

## 2017-05-28 NOTE — PROGRESS NOTES
Progress Note  [facility]    Admit Date: 5/26/2017   LOS: 2 days     SUBJECTIVE:     Follow-up For:  Weakness    Aleksandr Schultz was seen and examined. No acute even occurred overnight. He reports improvement in his UE weakness. He still has weakness in RLE and problems with ambulation.     Scheduled Meds:   albuterol sulfate  2.5 mg Nebulization Q4H WAKE    amiodarone  200 mg Oral BID    atorvastatin  40 mg Oral Daily    cetirizine  10 mg Oral Daily    famotidine  20 mg Oral BID    fluticasone  1 spray Each Nare Daily    folic acid  1 mg Oral Daily    furosemide  40 mg Oral Daily    ipratropium  500 mcg Nebulization Q4H WAKE    potassium chloride SA  10 mEq Oral Daily    sodium chloride 0.9%  3 mL Intravenous Q8H    tamsulosin  0.4 mg Oral Daily    vitamin D  1,000 Units Oral Daily     Continuous Infusions:   PRN Meds:acetaminophen, cyclobenzaprine, dextrose 50%, glucagon (human recombinant), insulin aspart, magnesium hydroxide 400 mg/5 ml, nitroGLYCERIN    Review of patient's allergies indicates:   Allergen Reactions    Iodinated contrast media - oral and iv dye        Review of Systems  -As per subjective.   OBJECTIVE:     Vital Signs (Most Recent)  Temp: 98.6 °F (37 °C) (05/28/17 0800)  Pulse: 61 (05/28/17 0858)  Resp: 20 (05/28/17 0858)  BP: (!) 184/78 (05/28/17 0800)  SpO2: 95 % (05/28/17 0858)    Vital Signs Range (Last 24H):  Temp:  [97.5 °F (36.4 °C)-98.6 °F (37 °C)]   Pulse:  [54-66]   Resp:  [18-21]   BP: (146-193)/(62-78)   SpO2:  [94 %-97 %]     I & O (Last 24H):  Intake/Output Summary (Last 24 hours) at 05/28/17 1012  Last data filed at 05/28/17 0536   Gross per 24 hour   Intake              430 ml   Output              250 ml   Net              180 ml     Wt Readings from Last 3 Encounters:   05/28/17 80.2 kg (176 lb 12.8 oz)   05/09/17 76.4 kg (168 lb 6.9 oz)   05/02/17 82.6 kg (182 lb 1.6 oz)     Physical Exam:  GEN:  NAD  HEENT:  JUANITO PERRL OP Clear  CV:  RRR no m/r/g  RESP:   CTAB no w/r/r  ABD:  Soft NT/ND +BS  NEURO:  4/5 right upper and lower extremity strength.  CN II - XII intact.  No additional focal deficits.      Laboratory:  LABS  CBC    Recent Labs  Lab 05/27/17 0423 05/28/17  0610   WBC 5.03 6.16   RBC 4.34* 4.27*   HGB 13.1* 12.9*   HCT 40.0 39.0*    237   MCV 92 91   MCH 30.2 30.2   MCHC 32.8 33.1     BMP    Recent Labs  Lab 05/27/17 0423 05/28/17  0610    141   K 3.3* 3.4*   CO2 28 26    105   BUN 15 20   CREATININE 1.0 1.1   * 151*       POCT-Glucose  POCT Glucose   Date Value Ref Range Status   05/28/2017 168 (H) 70 - 110 mg/dL Final   05/27/2017 221 (H) 70 - 110 mg/dL Final   05/27/2017 210 (H) 70 - 110 mg/dL Final   05/27/2017 242 (H) 70 - 110 mg/dL Final   05/27/2017 149 (H) 70 - 110 mg/dL Final   05/26/2017 129 (H) 70 - 110 mg/dL Final         Recent Labs  Lab 05/27/17  0423 05/28/17  0610   CALCIUM 8.9 8.9     LFT    Recent Labs  Lab 05/27/17 0423 05/28/17  0610   PROT 6.8 6.5   ALBUMIN 3.3* 3.2*   BILITOT 0.4 0.3   AST 20 16   ALKPHOS 82 85   ALT 26 22     COAGS  No results for input(s): INR, APTT in the last 168 hours.    Invalid input(s): PT  CE  No results for input(s): TROPONINI, CKTOTAL, CKMB in the last 168 hours.  ABGs  No results for input(s): PH, PCO2, PO2, HCO3, POCSATURATED, BE in the last 24 hours.  BNP  No results for input(s): BNP in the last 168 hours.  UA  No results for input(s): COLORU, CLARITYU, SPECGRAV, PHUR, PROTEINUA, GLUCOSEU, BLOODU, WBCU, RBCU, BACTERIA, MUCUS in the last 24 hours.    Invalid input(s):  BILIRUBINCON  LAST HbA1c  Lab Results   Component Value Date    HGBA1C 6.6 (H) 05/26/2017       ASSESSMENT/PLAN:   Aleksandr Schultz is a 89 y.o. male who  has a past medical history of Aortic aneurysm; Asthma, chronic; Chronic kidney disease; CKD (chronic kidney disease); COPD (chronic obstructive pulmonary disease); Coronary artery disease; Diabetes mellitus; Glaucoma (increased eye pressure); Hypertension;  Peripheral vascular disease; Prostate cancer; Sleep apnea; Small bowel obstruction; Status post balloon angioplasty of pulmonary artery branches; and Stroke. presents with CVA (cerebral vascular accident)     CVA  -patient transferred from [facility] the [facility] for stroke workup  -MRI brain: acute infarct in corona radiata   -Started atorvastatin  -carotid US: no stenosis  -2D Echo: pending  -neuro consult: pending  -PT/OT/ST: needs device for ambulation and ADL skills  -will restart home medications     Dispo: Pending Clinical improvment  Plan discussed with Staff        5/28/2017 Jocelyn Gallego MD  10:12 AM

## 2017-05-29 VITALS
TEMPERATURE: 99 F | HEIGHT: 67 IN | SYSTOLIC BLOOD PRESSURE: 167 MMHG | OXYGEN SATURATION: 97 % | RESPIRATION RATE: 18 BRPM | WEIGHT: 176.5 LBS | HEART RATE: 69 BPM | BODY MASS INDEX: 27.7 KG/M2 | DIASTOLIC BLOOD PRESSURE: 74 MMHG

## 2017-05-29 LAB
ALBUMIN SERPL BCP-MCNC: 3.2 G/DL
ALP SERPL-CCNC: 84 U/L
ALT SERPL W/O P-5'-P-CCNC: 20 U/L
ANION GAP SERPL CALC-SCNC: 11 MMOL/L
AORTIC VALVE REGURGITATION: ABNORMAL
AST SERPL-CCNC: 14 U/L
BASOPHILS # BLD AUTO: 0.04 K/UL
BASOPHILS NFR BLD: 0.5 %
BILIRUB SERPL-MCNC: 0.4 MG/DL
BUN SERPL-MCNC: 18 MG/DL
CALCIUM SERPL-MCNC: 8.8 MG/DL
CHLORIDE SERPL-SCNC: 100 MMOL/L
CO2 SERPL-SCNC: 25 MMOL/L
CREAT SERPL-MCNC: 1.2 MG/DL
DIASTOLIC DYSFUNCTION: YES
DIFFERENTIAL METHOD: ABNORMAL
EOSINOPHIL # BLD AUTO: 0.3 K/UL
EOSINOPHIL NFR BLD: 3.4 %
ERYTHROCYTE [DISTWIDTH] IN BLOOD BY AUTOMATED COUNT: 13.8 %
EST. GFR  (AFRICAN AMERICAN): >60 ML/MIN/1.73 M^2
EST. GFR  (NON AFRICAN AMERICAN): 53 ML/MIN/1.73 M^2
ESTIMATED PA SYSTOLIC PRESSURE: 23.98
GLUCOSE SERPL-MCNC: 208 MG/DL
HCT VFR BLD AUTO: 39.8 %
HGB BLD-MCNC: 13.4 G/DL
LYMPHOCYTES # BLD AUTO: 2.1 K/UL
LYMPHOCYTES NFR BLD: 27.1 %
MAGNESIUM SERPL-MCNC: 2.3 MG/DL
MCH RBC QN AUTO: 30.9 PG
MCHC RBC AUTO-ENTMCNC: 33.7 %
MCV RBC AUTO: 92 FL
MONOCYTES # BLD AUTO: 0.7 K/UL
MONOCYTES NFR BLD: 8.8 %
NEUTROPHILS # BLD AUTO: 4.6 K/UL
NEUTROPHILS NFR BLD: 60.1 %
PHOSPHATE SERPL-MCNC: 3 MG/DL
PLATELET # BLD AUTO: 223 K/UL
PMV BLD AUTO: 9.2 FL
POCT GLUCOSE: 166 MG/DL (ref 70–110)
POTASSIUM SERPL-SCNC: 3.8 MMOL/L
PROT SERPL-MCNC: 6.7 G/DL
RBC # BLD AUTO: 4.33 M/UL
RETIRED EF AND QEF - SEE NOTES: 55 (ref 55–65)
SODIUM SERPL-SCNC: 136 MMOL/L
WBC # BLD AUTO: 7.65 K/UL

## 2017-05-29 PROCEDURE — 84100 ASSAY OF PHOSPHORUS: CPT

## 2017-05-29 PROCEDURE — 97535 SELF CARE MNGMENT TRAINING: CPT

## 2017-05-29 PROCEDURE — 25000003 PHARM REV CODE 250: Performed by: FAMILY MEDICINE

## 2017-05-29 PROCEDURE — 80053 COMPREHEN METABOLIC PANEL: CPT

## 2017-05-29 PROCEDURE — 36415 COLL VENOUS BLD VENIPUNCTURE: CPT

## 2017-05-29 PROCEDURE — G8989 SELF CARE D/C STATUS: HCPCS | Mod: CJ

## 2017-05-29 PROCEDURE — 97116 GAIT TRAINING THERAPY: CPT

## 2017-05-29 PROCEDURE — 94761 N-INVAS EAR/PLS OXIMETRY MLT: CPT

## 2017-05-29 PROCEDURE — 25000242 PHARM REV CODE 250 ALT 637 W/ HCPCS: Performed by: FAMILY MEDICINE

## 2017-05-29 PROCEDURE — 96374 THER/PROPH/DIAG INJ IV PUSH: CPT

## 2017-05-29 PROCEDURE — 85025 COMPLETE CBC W/AUTO DIFF WBC: CPT

## 2017-05-29 PROCEDURE — 97530 THERAPEUTIC ACTIVITIES: CPT

## 2017-05-29 PROCEDURE — 83735 ASSAY OF MAGNESIUM: CPT

## 2017-05-29 PROCEDURE — 94660 CPAP INITIATION&MGMT: CPT

## 2017-05-29 PROCEDURE — 97110 THERAPEUTIC EXERCISES: CPT

## 2017-05-29 PROCEDURE — 94640 AIRWAY INHALATION TREATMENT: CPT

## 2017-05-29 PROCEDURE — G8988 SELF CARE GOAL STATUS: HCPCS | Mod: CI

## 2017-05-29 RX ADMIN — ALBUTEROL SULFATE 2.5 MG: 2.5 SOLUTION RESPIRATORY (INHALATION) at 11:05

## 2017-05-29 RX ADMIN — TAMSULOSIN HYDROCHLORIDE 0.4 MG: 0.4 CAPSULE ORAL at 09:05

## 2017-05-29 RX ADMIN — FAMOTIDINE 20 MG: 20 TABLET, FILM COATED ORAL at 09:05

## 2017-05-29 RX ADMIN — LISINOPRIL 10 MG: 5 TABLET ORAL at 09:05

## 2017-05-29 RX ADMIN — CETIRIZINE HYDROCHLORIDE 10 MG: 10 TABLET, FILM COATED ORAL at 09:05

## 2017-05-29 RX ADMIN — IPRATROPIUM BROMIDE 500 MCG: 0.5 SOLUTION RESPIRATORY (INHALATION) at 08:05

## 2017-05-29 RX ADMIN — POTASSIUM CHLORIDE 10 MEQ: 10 TABLET, EXTENDED RELEASE ORAL at 09:05

## 2017-05-29 RX ADMIN — AMIODARONE HYDROCHLORIDE 200 MG: 200 TABLET ORAL at 09:05

## 2017-05-29 RX ADMIN — INSULIN ASPART 2 UNITS: 100 INJECTION, SOLUTION INTRAVENOUS; SUBCUTANEOUS at 09:05

## 2017-05-29 RX ADMIN — SODIUM CHLORIDE, PRESERVATIVE FREE 3 ML: 5 INJECTION INTRAVENOUS at 06:05

## 2017-05-29 RX ADMIN — AMLODIPINE BESYLATE 10 MG: 5 TABLET ORAL at 09:05

## 2017-05-29 RX ADMIN — ALBUTEROL SULFATE 2.5 MG: 2.5 SOLUTION RESPIRATORY (INHALATION) at 08:05

## 2017-05-29 RX ADMIN — VITAMIN D, TAB 1000IU (100/BT) 1000 UNITS: 25 TAB at 09:05

## 2017-05-29 RX ADMIN — ATORVASTATIN CALCIUM 40 MG: 40 TABLET, FILM COATED ORAL at 09:05

## 2017-05-29 RX ADMIN — FOLIC ACID 1 MG: 1 TABLET ORAL at 09:05

## 2017-05-29 RX ADMIN — IPRATROPIUM BROMIDE 500 MCG: 0.5 SOLUTION RESPIRATORY (INHALATION) at 11:05

## 2017-05-29 RX ADMIN — FUROSEMIDE 40 MG: 40 TABLET ORAL at 09:05

## 2017-05-29 RX ADMIN — APIXABAN 2.5 MG: 2.5 TABLET, FILM COATED ORAL at 09:05

## 2017-05-29 RX ADMIN — FLUTICASONE PROPIONATE 1 SPRAY: 50 SPRAY, METERED NASAL at 09:05

## 2017-05-29 NOTE — PLAN OF CARE
Follow-up With   Details   Why   Contact Info   Ascencion Cedeño MD   Call   You need to be seen in 1-2 weeks by your doctor. Please call them for an appointment on 5/30/17 as they are closed on 5/29/17.   [address]  [place] [address]  [phone]   Marianne Koehler MD   On 8/16/2017   3:15pm, patient should arrive for 2:45pm   [address]  [address]  [place] [id number] [phone]   [facility]       Home Health   [address] [facility] [address]  [place] [address]  [phone]         TN spoke with daughter, she will call PCP for appt.     05/29/17 1123   Final Note   Assessment Type Final Discharge Note   Discharge Disposition Home-Health   Discharge planning education complete? Yes   What phone number can be called within the next 1-3 days to see how you are doing after discharge? [phone]   Hospital Follow Up  Appt(s) scheduled? Yes   Discharge plans and expectations educations in teach back method with documentation complete? Yes   Offered [facility] -- Bedside Delivery? Yes   Discharge/Hospital Encounter Summary to (non-[facility]) PCP Yes   Referral to Outpatient Case Management complete? n/a   Referral to / orders for Home Health Complete? Yes   30 day supply of medicines given at discharge, if documented non-compliance / non-adherence? n/a   Any social issues identified prior to discharge? n/a   Did you assess the readiness or willingness of the family or caregiver to support self management of care? Yes   Right Care Referral Info   Post Acute Recommendation Home-care

## 2017-05-29 NOTE — PLAN OF CARE
"Problem: Occupational Therapy Goal  Goal: Occupational Therapy Goal  Goals to be met by: 6/27/2017     Patient will increase functional independence with ADLs by performing:    UE Dressing with Modified [place].  LE Dressing with Modified [place].  Grooming while standing at sink with Modified [place].  Toileting from bedside commode with Modified [place] for hygiene and clothing management.   Bathing from  shower chair/bench with Supervision.  Stand pivot transfers with Modified [place].  Toilet transfer to bedside commode with Modified [place].    Pt. Has SPC used for community ambulation & rollator for household ambulation.  Has built-in bench in shower with GB.  Needs BSC to increase independence & safety in toilet T/F.  To benefit from OT/PT in SNF.   Outcome: Ongoing (interventions implemented as appropriate)  Patient improving with functional transitions and self-care tasks. Still with c/o RLE weakness and "heavyness", but ambulated with CGA. Patient will benefit from HH PT and transition to OP PT.      "

## 2017-05-29 NOTE — PLAN OF CARE
TN faxed  orders to [facility] [phone], fax [phone]    Pt's daughter told the floor nurse that BSC was not needed, they have one at home.       05/28/17 1931   Final Note   Assessment Type Final Discharge Note   Discharge Disposition Home-Health  ([facility] [phone])   What phone number can be called within the next 1-3 days to see how you are doing after discharge? [phone]   Hospital Follow Up  Appt(s) scheduled? No  (offices closed, [place] to follow up )   Offered [facility] -- Bedside Delivery? n/a   Discharge/Hospital Encounter Summary to (non[facility]) PCP Yes   Referral to Outpatient Case Management complete? n/a   Referral to / orders for Home Health Complete? Yes  ([facility] [phone])   30 day supply of medicines given at discharge, if documented non-compliance / non-adherence? n/a   Any social issues identified prior to discharge? n/a   Did you assess the readiness or willingness of the family or caregiver to support self management of care? n/a   Right Care Referral Info   Post Acute Recommendation Home-care  ([facility] [phone])   Referral Type [facility] [phone]  ([facility] [phone])   Facility Name [facility] [phone]     [place] RN Transitional Navigator  [phone]

## 2017-05-29 NOTE — PLAN OF CARE
Problem: Diabetes, Type 2 (Adult)  Goal: Signs and Symptoms of Listed Potential Problems Will be Absent, Minimized or Managed (Diabetes, Type 2)  Signs and symptoms of listed potential problems will be absent, minimized or managed by discharge/transition of care (reference Diabetes, Type 2 (Adult) CPG).   Outcome: Ongoing (interventions implemented as appropriate)  Accu checks and sliding scale insulin ordered and administered as ordered    Problem: Patient Care Overview  Goal: Plan of Care Review  Outcome: Ongoing (interventions implemented as appropriate)  Patient AAOx4 moving all extremities as normal.  Respirations even and unlabored. Skin warm dry intact and normal color of ethnicity. Telemetry monitor on, functioning and displaying no red alarms.  Plan of care discussed and goals created. Patient educated on diagnosis and medications. Understanding verbalized.    Problem: Fall Risk (Adult)  Goal: Absence of Falls  Patient will demonstrate the desired outcomes by discharge/transition of care.    Outcome: Ongoing (interventions implemented as appropriate)  Family member at bedside. Alarm on and audible. Patient educated on fall risks. Understanding verbalized

## 2017-05-29 NOTE — PLAN OF CARE
Problem: Physical Therapy Goal  Goal: Physical Therapy Goal  Goals to be met by: 17     Patient will increase functional independence with mobility by performin. Gait  x 200 feet with Modified [place] using rollator  2. Ascend/Descend curb step with Stand-by Assistance using Single-point Cane .  3. Lower extremity exercise program x15 reps per handout, with supervision     Outcome: Ongoing (interventions implemented as appropriate)  Continue working toward goals.

## 2017-05-29 NOTE — NURSING
Patient discharge instructions given and reviewed. Med rec reviewed with Patient. Patient verbalized understanding. Education provided on new medication and diagnosis. PIV d/aaliyah tip intact. Pt tolerated well. Tele box returned to nurses station. Awaiting transportation home.

## 2017-05-29 NOTE — PLAN OF CARE
Follow-up With  Details  Why  Contact Info   Ascencion Cedeño MD  Call  You need to be seen in 1-2 weeks by your doctor. Please call them for an appointment on 5/30/17 as they are closed on 5/29/17.  [address]  [place] [address]  [phone]   Marianne Koehler MD  On 8/16/2017  3:15pm, patient should arrive for 2:45pm  [address]  [address]  [place] [address]  [phone]   [facility]  [address] [facility]  [place] [address]  [phone]             05/29/17 1025   Discharge Reassessment   Assessment Type Discharge Planning Reassessment   Discharge Plan A [facility] was unable to establish PCP appt as they are closed today.  [facility] was only able to get a neurology appt in August 2017.  Future Appointments  Date Time Provider Department Center   6/16/2017 9:00 AM Lynne Buckner MD MET CARDIO [place]

## 2017-05-29 NOTE — PROGRESS NOTES
Progress Note  [facility]    Admit Date: 5/26/2017   LOS: 3 days     SUBJECTIVE:     Follow-up For:  Weakness    ID: 89 yo M with pmh of CDK, COPD, CAD, DM admitted for CVA.    Today, patient seen and examined.  KIMBERLEY. Patient states that his right sided leg weakness has returned. He states that he had blurry vision from the right eye yesterday and it is improved today but not back to baseline.  Denies any CP, SOB, N/V/D, headaches, fever or chills.       Scheduled Meds:   albuterol sulfate  2.5 mg Nebulization Q4H WAKE    amiodarone  200 mg Oral BID    amlodipine  10 mg Oral Daily    apixaban  2.5 mg Oral BID    atorvastatin  40 mg Oral Daily    cetirizine  10 mg Oral Daily    famotidine  20 mg Oral BID    fluticasone  1 spray Each Nare Daily    folic acid  1 mg Oral Daily    furosemide  40 mg Oral Daily    ipratropium  500 mcg Nebulization Q4H WAKE    lisinopril  10 mg Oral Daily    potassium chloride SA  10 mEq Oral Daily    sodium chloride 0.9%  3 mL Intravenous Q8H    tamsulosin  0.4 mg Oral Daily    vitamin D  1,000 Units Oral Daily     Continuous Infusions:   PRN Meds:acetaminophen, cyclobenzaprine, dextrose 50%, glucagon (human recombinant), insulin aspart, labetalol, magnesium hydroxide 400 mg/5 ml, nitroGLYCERIN    Review of patient's allergies indicates:   Allergen Reactions    Iodinated contrast media - oral and iv dye        Review of Systems  -As per subjective.     OBJECTIVE:     Vital Signs (Most Recent)  Temp: 97.6 °F (36.4 °C) (05/29/17 0450)  Pulse: 63 (05/29/17 0450)  Resp: 16 (05/29/17 0450)  BP: (!) 158/72 (05/29/17 0450)  SpO2: 96 % (05/29/17 0419)    Vital Signs Range (Last 24H):  Temp:  [97.5 °F (36.4 °C)-98.7 °F (37.1 °C)]   Pulse:  [58-75]   Resp:  [16-20]   BP: (118-198)/(60-90)   SpO2:  [93 %-98 %]     I & O (Last 24H):    Intake/Output Summary (Last 24 hours) at 05/29/17 6771  Last data filed at 05/29/17 0100   Gross per 24 hour   Intake              125 ml    Output              250 ml   Net             -125 ml     Wt Readings from Last 3 Encounters:   05/29/17 80.1 kg (176 lb 8 oz)   05/09/17 76.4 kg (168 lb 6.9 oz)   05/02/17 82.6 kg (182 lb 1.6 oz)     Physical Exam:  GEN:  NAD, resting comfortably in bed  HEENT:  JUANITO PERRL OP Clear  CV:  RRR no m/r/g  RESP:  CTAB no w/r/r  ABD:  Soft NT/ND +BS  NEURO:  4/5 right upper and lower extremity strength.  CN II - XII intact.  No additional focal deficits.      Laboratory:  LABS  CBC    Recent Labs  Lab 05/27/17 0423 05/28/17  0610   WBC 5.03 6.16   RBC 4.34* 4.27*   HGB 13.1* 12.9*   HCT 40.0 39.0*    237   MCV 92 91   MCH 30.2 30.2   MCHC 32.8 33.1     BMP    Recent Labs  Lab 05/27/17 0423 05/28/17  0610    141   K 3.3* 3.4*   CO2 28 26    105   BUN 15 20   CREATININE 1.0 1.1   * 151*       POCT-Glucose  POCT Glucose   Date Value Ref Range Status   05/29/2017 166 (H) 70 - 110 mg/dL Final   05/28/2017 177 (H) 70 - 110 mg/dL Final   05/28/2017 199 (H) 70 - 110 mg/dL Final   05/28/2017 251 (H) 70 - 110 mg/dL Final   05/28/2017 168 (H) 70 - 110 mg/dL Final   05/27/2017 221 (H) 70 - 110 mg/dL Final   05/27/2017 210 (H) 70 - 110 mg/dL Final   05/27/2017 242 (H) 70 - 110 mg/dL Final   05/27/2017 149 (H) 70 - 110 mg/dL Final   05/26/2017 129 (H) 70 - 110 mg/dL Final         Recent Labs  Lab 05/27/17  0423 05/28/17  0610   CALCIUM 8.9 8.9     LFT    Recent Labs  Lab 05/27/17 0423 05/28/17  0610   PROT 6.8 6.5   ALBUMIN 3.3* 3.2*   BILITOT 0.4 0.3   AST 20 16   ALKPHOS 82 85   ALT 26 22     LAST HbA1c  Lab Results   Component Value Date    HGBA1C 6.6 (H) 05/26/2017       ASSESSMENT/PLAN:     Patient is a 89 yo male with PMH of aortic aneursym, Copd, CKD, CAD, DM, HTN, PVS and CVA transferred from [facility] for CVA work up and found to have acute CVA of left corona radiata.       CVA  -patient transferred from [facility] for stroke workup  -MRI brain: acute infarct in corona radiata    -Started atorvastatin  -carotid US: no stenosis  -2D Echo: pending  -neuro consult: pending  -PT/OT/ST: needs device for ambulation and ADL skills  -will restart home medications     COPD  - Continuing home medications.  - % on 2L NC    MICHELLE  - CPAP QHS    HTN  - -198/60-90  - Continuing home medications.      CODE: full  Diet: Cardiac  PPx:   Dispo: Pending Clinical improvment    Plan discussed with Staff      Steph Orellana D.O.  [facility] HO-1  05/29/2017

## 2017-05-29 NOTE — NURSING
Notified Dr. Schultz that patient had some increased weakness in his right Lower extremity similar to which caused him to come to the hospital. This sudden weakness occurred while walking to the bathroom in his room. All other extremities with regular strength. No drooping noted in face.  Smile is even. Patient had reported some blurred vision earlier in right eye but states it is just about cleared up.  Continued monitoring ordered at this time

## 2017-05-29 NOTE — PLAN OF CARE
Problem: Patient Care Overview  Goal: Plan of Care Review  Outcome: Ongoing (interventions implemented as appropriate)  Patient given aero treatment with no adverse reactions noted.  Patient tolerated treatment well.

## 2017-05-29 NOTE — PT/OT/SLP PROGRESS
Physical Therapy  Treatment    Aleksandr Schultz   MRN: [id number]   Admitting Diagnosis: Cerebrovascular accident (CVA)    PT Received On: 05/29/17  PT Start Time: 1234     PT Stop Time: 1258    PT Total Time (min): 24 min       Billable Minutes:  Gait Training8 and Therapeutic Exercise 16    Treatment Type: Treatment  PT/PTA: PTA     PTA Visit Number: 1       General Precautions: Standard, fall, hearing impaired  Orthopedic Precautions: N/A   Braces:      Do you have any cultural, spiritual, Pentecostalism conflicts, given your current situation?: none    Subjective:  Communicated with RN (Flora) prior to session.  Pt agreed to tx.  Pt was sitting EOB finishing up tx with EARLY.  Pt's daughter was present.    Pain/Comfort  Pain Rating 1: 0/10  Pain Rating Post-Intervention 1: 0/10    Objective:   Patient found with: peripheral IV    Functional Mobility:  Bed Mobility:        Transfers:  Sit <> Stand Assistance: Stand By Assistance, Contact Guard Assistance (3x)  Sit <> Stand Assistive Device: Rolling Walker, Straight Cane (2x with Rw and 1x with S.C.)    Gait:   Gait Distance: 30ft with S.C. and close CGA with VC's for sequencing.  Pt takes two steps for every one time he advances S.C.  Pt demonstrated decreased coordination with S.C. use when turning and is unsafe with use of a S.C. at this time.  PTA witnessed pt ambulating with a RW with O.T. and appeared to have better stability and was safer with using a RW as compared to S.C.  Assistance 1: Minimum assistance  Gait Assistive Device: Single point cane  Gait Pattern: 3-point gait  Gait Deviation(s): decreased miranda, increased time in double stance, decreased velocity of limb motion, decreased step length, decreased stride length, decreased toe-to-floor clearance, decreased weight-shifting ability, forward lean    Stairs:      Balance:   Static Sit: NORMAL: No deviations seen in posture held statically  Dynamic Sit: GOOD+: Maintains balance through MAXIMAL excursions  of active trunk motion  Static Stand: FAIR+: Takes MINIMAL challenges from all directions with Rw  Dynamic stand: FAIR+: Needs CLOSE SUPERVISION during gait and is able to right self with minor LOB     Therapeutic Activities and Exercises:  Seated BLE therex: AP, LAQ (with occasionally assist on R for full extension), hip flexion/ABd/ADD x 15 reps.  Standing BLE therex with RW for support and CGA: marching in place and mini squats with vc's for proper technique x 15 reps.    AMB: as above.  Ambulated with pt using S.C. Secondary to pt using S.C and rollator at home.  Pt is not safe ambulating with a S.C at this time, and would be safer ambulating with RW.  Pt and pt's daughter agreed.  PTA also asked pt's daughter to ask P.T. (HH or outpatient) to assess pt's gait/balance with rollator use secondary to wheels on rollator pivot and not sure if pt is able to handle that at this time.  Pt's daughter stated that she does have a RW at home pt can use for now.   Nsg reports that pt had an episode of increased RLE weakness and increased R eye blurriness late last night that resolved.      AM-PAC 6 CLICK MOBILITY  How much help from another person does this patient currently need?   1 = Unable, Total/Dependent Assistance  2 = A lot, Maximum/Moderate Assistance  3 = A little, Minimum/Contact Guard/Supervision  4 = None, Modified [place]/Independent    Turning over in bed (including adjusting bedclothes, sheets and blankets)?: 4  Sitting down on and standing up from a chair with arms (e.g., wheelchair, bedside commode, etc.): 3  Moving from lying on back to sitting on the side of the bed?: 4  Moving to and from a bed to a chair (including a wheelchair)?: 3  Need to walk in hospital room?: 3  Climbing 3-5 steps with a railing?: 3  Total Score: 20    AM-PAC Raw Score CMS G-Code Modifier Level of Impairment Assistance   6 % Total / Unable   7 - 9 CM 80 - 100% Maximal Assist   10 - 14 CL 60 - 80% Moderate Assist   15  - 19 CK 40 - 60% Moderate Assist   20 - 22 CJ 20 - 40% Minimal Assist   23 CI 1-20% SBA / CGA   24 CH 0% Independent/ Mod I     Patient left sitting EOB with his daughter in room waiting to be discharged. with all lines intact, call button in reach and RN notified.    Assessment:  Aleksandr Schultz is a 89 y.o. male with a medical diagnosis of Cerebrovascular accident (CVA) and presents with decreased strength RLE > LLE, endurance, and balance.  Pt would continue to benefit from P.T. To address impairments listed below.    Rehab identified problem list/impairments: Rehab identified problem list/impairments: weakness, impaired endurance, impaired self care skills, impaired functional mobilty, gait instability, impaired balance, decreased lower extremity function, decreased coordination (decreased coordination with S.C. use.)    Rehab potential is good.    Activity tolerance: Good    Discharge recommendations: Discharge Facility/Level Of Care Needs: outpatient PT (Pt's daughter states she will be able to drive pt to outpatient therapy.)     Barriers to discharge: Barriers to Discharge: None    Equipment recommendations: Equipment Needed After Discharge: bedside commode     GOALS:    Physical Therapy Goals        Problem: Physical Therapy Goal    Goal Priority Disciplines Outcome Goal Variances Interventions   Physical Therapy Goal     PT/OT, PT Ongoing (interventions implemented as appropriate)     Description:  Goals to be met by: 17     Patient will increase functional independence with mobility by performin. Gait  x 200 feet with Modified [place] using rollator  2. Ascend/Descend curb step with Stand-by Assistance using Single-point Cane .  3. Lower extremity exercise program x15 reps per handout, with supervision                      PLAN:    Patient to be seen 6 x/week  to address the above listed problems via gait training, therapeutic activities, therapeutic exercises, neuromuscular  re-education  Plan of Care expires: 06/27/17  Plan of Care reviewed with: patient, daughter         Shira Singh, PTA  05/29/2017

## 2017-05-29 NOTE — PROGRESS NOTES
Patient was on ASA before his CVA. Patient currently is on eliquis for Afib that was started about 2 weeks ago. Patient is at risk for both bleeding and ischemic stroke. Group decision with family occurred, and it was decided patient would resume ASA and statin. Patient has co morbidities and that are counterintuitive for clear treatment for patient's stroke. Neuro recs are to continue medications as well.    Roberth Llamas MD  [facility] PGY 2  5/28/2017 7:28 PM

## 2017-05-29 NOTE — PT/OT/SLP PROGRESS
Occupational Therapy  Treatment    Aleksandr Schultz   MRN: [id number]   Admitting Diagnosis: Cerebrovascular accident (CVA)    OT Date of Treatment: 05/29/17   OT Start Time: 1209  OT Stop Time: 1233  OT Total Time (min): 24 min    Billable Minutes:  Self Care/Home Management 14 and Therapeutic Activity 10    General Precautions: Standard, fall, hearing impaired, vision impaired  Orthopedic Precautions: N/A  Braces: N/A    Do you have any cultural, spiritual, Congregational conflicts, given your current situation?: None    Subjective:  Communicated with nurseFlora prior to session.    Pain/Comfort  Pain Rating 1: 0/10  Pain Rating Post-Intervention 1: 0/10    Objective:  Patient found with: telemetry, peripheral IV     Functional Mobility:  Bed Mobility: N/A    Transfers:   Sit <> Stand Assistance: Stand By Assistance  Sit <> Stand Assistive Device: Rolling Walker  Toilet Transfer Assistance: Stand By Assistance  Toilet Transfer Assistive Device: 4 wheeled walker    Functional Ambulation: CGA using RW    Activities of Daily Living:  UE Dressing Level of Assistance: Set-up Assistance  LE Dressing Level of Assistance: Stand by assistance  Toileting Where Assessed: Toilet  Toileting Level of Assistance: Stand by assistance    Balance:   Static Sit: NORMAL: No deviations seen in posture held statically  Dynamic Sit: GOOD-: Maintains balance through MODERATE excursions of active trunk movement,     Static Stand: GOOD-: Takes MODERATE challenges from all directions inconsistently  Dynamic stand: FAIR: Needs CONTACT GUARD during gait    Therapeutic Activities and Exercises:  Patient eager for therapy participation. Ambulated to bathroom using rollator, per daughter. Completed toileting with SBA and t/f from toilet with SBA. Patient sat EOB and completed UB/LB dsg with set-up-SBA. Patient completed ambulation in pan to improve functional endurance, using RW with CGA and min VCs to increase awareness to objects on R side. Patient  "still reporting some blurryness in R eye.    AM-PAC 6 CLICK ADL   How much help from another person does this patient currently need?   1 = Unable, Total/Dependent Assistance  2 = A lot, Maximum/Moderate Assistance  3 = A little, Minimum/Contact Guard/Supervision  4 = None, Modified [place]/Independent    Putting on and taking off regular lower body clothing? : 3  Bathing (including washing, rinsing, drying)?: 3  Toileting, which includes using toilet, bedpan, or urinal? : 3  Putting on and taking off regular upper body clothing?: 4  Taking care of personal grooming such as brushing teeth?: 4  Eating meals?: 4  Total Score: 21     AM-PAC Raw Score CMS "G-Code Modifier Level of Impairment Assistance   6 % Total / Unable   7 - 8 CM 80 - 100% Maximal Assist   9-13 CL 60 - 80% Moderate Assist   14 - 19 CK 40 - 60% Moderate Assist   20 - 22 CJ 20 - 40% Minimal Assist   23 CI 1-20% SBA / CGA   24 CH 0% Independent/ Mod I       Patient left sitting EOB with all lines intact, call button in reach, RN notified and daughter and PTA present    ASSESSMENT:  Aleksandr Schultz is a 89 y.o. male with a medical diagnosis of Cerebrovascular accident (CVA)   Patient improving with functional transitions and self-care tasks. Still with c/o RLE weakness and "heavyness", but ambulated with CGA. Patient will benefit from HH PT and transition to OP PT.    Rehab identified problem list/impairments: Rehab identified problem list/impairments: weakness, impaired endurance, visual deficits, gait instability, impaired balance, decreased lower extremity function, pain    Rehab potential is excellent.    Activity tolerance: Good    Discharge recommendations: Discharge Facility/Level Of Care Needs: outpatient PT     Barriers to discharge: Barriers to Discharge: None    Equipment recommendations: bedside commode     GOALS:    Occupational Therapy Goals        Problem: Occupational Therapy Goal    Goal Priority Disciplines Outcome " Interventions   Occupational Therapy Goal     OT, PT/OT Ongoing (interventions implemented as appropriate)    Description:  Goals to be met by: 6/27/2017     Patient will increase functional independence with ADLs by performing:    UE Dressing with Modified [place].  LE Dressing with Modified [place].  Grooming while standing at sink with Modified [place].  Toileting from bedside commode with Modified [place] for hygiene and clothing management.   Bathing from  shower chair/bench with Supervision.  Stand pivot transfers with Modified [place].  Toilet transfer to bedside commode with Modified [place].    Pt. Has SPC used for community ambulation & rollator for household ambulation.  Has built-in bench in shower with GB.  Needs BSC to increase independence & safety in toilet T/F.  To benefit from OT/PT in SNF.                    Plan:  Patient to be seen 5 x/week to address the above listed problems via self-care/home management, therapeutic activities, therapeutic exercises  Plan of Care expires: 06/27/17  Plan of Care reviewed with: patient, daughter         Katiuska LAGUNA NEETA Brown  05/29/2017

## 2017-05-30 ENCOUNTER — PATIENT OUTREACH (OUTPATIENT)
Dept: ADMINISTRATIVE | Facility: CLINIC | Age: 82
End: 2017-05-30
Payer: MEDICARE

## 2017-05-30 NOTE — PT/OT/SLP DISCHARGE
Physical Therapy Discharge Summary    Aleksandr Schultz  MRN: [id number]   Cerebrovascular accident (CVA)   Patient Discharged from acute Physical Therapy on 17.  Please refer to prior PT noted date on 17 for functional status.     Assessment:   Patient has not met goals.  GOALS:    Physical Therapy Goals        Problem: Physical Therapy Goal    Goal Priority Disciplines Outcome Goal Variances Interventions   Physical Therapy Goal     PT/OT, PT Ongoing (interventions implemented as appropriate)     Description:  Goals to be met by: 17     Patient will increase functional independence with mobility by performin. Gait  x 200 feet with Modified [place] using rollator  2. Ascend/Descend curb step with Stand-by Assistance using Single-point Cane .  3. Lower extremity exercise program x15 reps per handout, with supervision                    Reasons for Discontinuation of Therapy Services  Transfer to alternate level of care.      Plan:  Patient Discharged to: Outpt recommended, pt going home with HH.

## 2017-05-30 NOTE — DISCHARGE SUMMARY
[facility]  Discharge Summary     Patient ID:  Aleksandr Schultz  [id number]  89 y.o.  11/17/1926    Admit date: 5/26/2017    Discharge Date and Time: 5/29/2017  1:13 PM    Admitting Physician: Sean Brandon III, MD     Discharge Provider: Quirino Pulido MD    Reason for Admission: CVA (cerebral vascular accident) [I63.9]    Admission Condition: stable    Procedures Performed: * No surgery found *    History of Present Illness:  Patient is a 89 y.o. male who presents with complaints of right sided numbness and weakness starting at 5pm yesterday.  Patient denied impaired speech and trouble swallowing at this time.  No additional complaints. Patient was transferred to us from [facility] ED for MRI and stroke workup.        Hospital Course (synopsis of major diagnoses, care, treatment, and services provided during the course of the hospital stay):     Patient had MRI that found acute left corona radiata stroke. Neurology was consulted and recommended to continue ASA and Eliquis (for Afib) and statin therapy. However, discussion with the family and the patient regarding anticoagulation and the decision was made to resume ASA and statin therapy. Patient was stable at discharge.     Consults: Neurology    Significant Diagnostic Studies:   Imaging Results          MRI Brain Without Contrast (Final result)  Result time 05/27/17 17:21:57    Final result by Jacob Gamble MD (05/27/17 17:21:57)                 Impression:       Small acute infarct in the left corona radiata.    Changes of chronic small vessel ischemic disease and cerebral volume loss.    Right maxillary sinus disease.    The findings were observed at  17:20:40 on Saturday, May 27, 2017 and discussed with  EDSON HENDRICKS  at 17:20:40 on Saturday, May 27, 2017.        Electronically signed by: JACOB GAMBLE MD  Date:     05/27/17  Time:    17:21              Narrative:    Exam: [id number]  05/27/17  16:02:39 [id number] ([facility]) : MRI BRAIN WITHOUT  CONTRAST    Technique:    Multiplanar and multisequence MRI of the brain was obtained without intravenous contrast.    Comparison:    CTA dated 11/22/2011    Findings:      The craniocervical junction is within normal limits. There is near complete opacification of the right maxillary sinus.  Trace amount of mucosal thickening is present in the left maxillary sinus.  The reminder of the paranasal sinuses and mastoid air cells are clear.  There are postoperative changes in the right orbit.    The intracranial flow voids are within normal limits.  There is a focus of diffusion restriction in the left periventricular white matter ( corona radiata).  No additional foci of diffusion restriction is identified.  There is scattered and confluent T2/FLAIR signal abnormalities within the periventricular and subcortical white matter.  The ventricles and sulci are prominent, consistent with cerebral volume loss.  There are no extra-axial fluid collections.  There is no evidence of intracranial hemorrhage.  There is no evidence of mass effect.                             US Carotid Bilateral (Final result)  Result time 05/27/17 16:28:06    Final result by Siobhan Chong MD (05/27/17 16:28:06)                 Impression:          1. Status post right carotid endarterectomy normal velocities.    2.  Atherosclerosis with no evidence of flow-limiting carotid stenosis greater than 50%.    As determined by Society of Radiologist in Ultrasound consensus.      Electronically signed by: SIOBHAN CHONG MD  Date:     05/27/17  Time:    16:28              Narrative:    Technique: Bilateral duplex carotid ultrasound performed using gray scale, color Doppler, and pulse Doppler analysis.    Comparison: None    FINDINGS:     Right common carotid:   Peak systolic velocity 36 cm/sec.    Right internal carotid artery: Status post right endarterectomy.    Peak systolic velocity: 50 cm/sec.   Peak diastolic velocity 12 cm/sec  IC/CC ratio:   1.3.    Left common carotid:   Peak systolic velocity 59 cm/sec.    Left internal carotid artery:     Peak systolic velocity 92  cm/sec.    Peak diastolic velocity 21 cm/sec  IC/CC ratio 1.5.    There is a small amount of homogeneous plaque present at the bilateral carotid bulbs.  Both vertebral arteries demonstrate antegrade flow.                               Final Diagnoses:    Principal Problem: Cerebrovascular accident (CVA)   Secondary Diagnoses:   Active Hospital Problems    Diagnosis  POA    *Cerebrovascular accident (CVA) [I63.9]  Yes     Left corona radiata (05/27/2017)      MICHELLE on CPAP [G47.33, Z99.89]  Not Applicable    Pure hypercholesterolemia [E78.00]  Unknown      Resolved Hospital Problems    Diagnosis Date Resolved POA   No resolved problems to display.       Discharged Condition: stable    Discharge Exam:  GEN:  NAD, resting comfortably in bed  HEENT:  JUANITO PERRL OP Clear  CV:  RRR no m/r/g  RESP:  CTAB no w/r/r  ABD:  Soft NT/ND +BS  NEURO:  4/5 right upper and lower extremity strength.  CN II - XII intact.  No additional focal deficits.         Disposition: Home or Self Care    Follow Up/Patient Instructions:     Medications:  Reconciled Home Medications:   Discharge Medication List as of 5/29/2017 12:12 PM      CONTINUE these medications which have NOT CHANGED    Details   albuterol (PROVENTIL) 2.5 mg /3 mL (0.083 %) nebulizer solution Take 2.5 mg by nebulization 4 (four) times daily as needed. , Starting 10/20/2012, Until Discontinued, Historical Med      amiodarone (PACERONE) 200 MG Tab Take 1 tablet (200 mg total) by mouth 2 (two) times daily., Starting 5/4/2017, Until Fri 5/4/18, Normal      amlodipine (NORVASC) 10 MG tablet TAKE 1 TABLET ONE TIME DAILY, Normal      apixaban 2.5 mg Tab Take 1 tablet (2.5 mg total) by mouth 2 (two) times daily., Starting 5/4/2017, Until Discontinued, Normal      aspirin (ECOTRIN) 81 MG EC tablet Take 81 mg by mouth once daily.  , Until Discontinued,  Historical Med      atorvastatin (LIPITOR) 40 MG tablet Take 40 mg by mouth once daily., Until Discontinued, Historical Med      cinnamon bark (CINNAMON) 500 mg capsule Take 500 mg by mouth once daily., Until Discontinued, Historical Med      cyclobenzaprine (FLEXERIL) 10 MG tablet Take 10 mg by mouth 3 (three) times daily as needed.  , Until Discontinued, Historical Med      fish oil-omega-3 fatty acids 300-1,000 mg capsule Take 2 g by mouth once daily.  , Until Discontinued, Historical Med      FLAXSEED OIL ORAL Take 1 tablet by mouth once daily. , Until Discontinued, Historical Med      folic acid (FOLVITE) 1 MG tablet Take 1 mg by mouth once daily., Until Discontinued, Historical Med      furosemide (LASIX) 40 MG tablet TAKE 1 TABLET ONE TIME DAILY, Normal      garlic 1,000 mg Cap Take 1 tablet by mouth 2 (two) times daily. , Until Discontinued, Historical Med      glimepiride (AMARYL) 4 MG tablet Take 4 mg by mouth daily with breakfast. Pt taking one pill twice a day., Starting 3/13/2014, Until Discontinued, Historical Med      ipratropium (ATROVENT) 0.02 % nebulizer solution Take 500 mcg by nebulization 4 (four) times daily. PRN, Starting 9/27/2012, Until Discontinued, Historical Med      lisinopril 10 MG tablet Take 1 tablet (10 mg total) by mouth once daily., Starting 4/11/2017, Until Discontinued, Normal      loratadine 10 mg Cap Take 1 tablet by mouth once daily. , Until Discontinued, Historical Med      metformin (GLUCOPHAGE) 500 MG tablet Take 500 mg by mouth daily with breakfast., Until Discontinued, Historical Med      mometasone (NASONEX) 50 mcg/actuation nasal spray 2 sprays by Nasal route daily as needed. , Until Discontinued, Historical Med      nitroGLYCERIN (NITROSTAT) 0.4 MG SL tablet Place 0.4 mg under the tongue every 5 (five) minutes as needed (PRN). , Until Discontinued, Historical Med      pindolol (VISKEN) 10 MG tablet Take 1 tablet (10 mg total) by mouth 2 (two) times daily., Starting  "5/4/2017, Until Fri 5/4/18, Normal      potassium chloride (MICRO-K) 10 MEQ CpSR Take 1 capsule (10 mEq total) by mouth once daily., Starting 4/28/2017, Until Discontinued, Normal      ranitidine (ZANTAC) 150 MG tablet Take 150 mg by mouth 2 (two) times daily.  , Until Discontinued, Historical Med      tamsulosin (FLOMAX) 0.4 mg Cp24 Take 0.4 mg by mouth once daily., Until Discontinued, Historical Med      vitamin D 1000 units Tab Take 1,000 Units by mouth once daily., Until Discontinued, Historical Med             Discharge Procedure Orders  COMMODE FOR HOME USE   Order Specific Question Answer Comments   Type: Standard    Height: 5' 7" (1.702 m)    Weight: 80.2 kg (176 lb 12.8 oz)    Does patient have medical equipment at home? cane, straight    Does patient have medical equipment at home? grab bar    Does patient have medical equipment at home? rollator    Length of need (1-99 months): 99      Ambulatory referral to Home Health   Referral Priority: Routine Referral Type: Home Health   Referral Reason: Specialty Services Required    Requested Specialty: Home Health Services    Number of Visits Requested: 1      Diet general     Diet general     Call MD for:  temperature >100.4     Call MD for:  persistent nausea and vomiting or diarrhea     Call MD for:  severe uncontrolled pain     Call MD for:  redness, tenderness, or signs of infection (pain, swelling, redness, odor or green/yellow discharge around incision site)     Call MD for:  severe persistent headache     Call MD for:  increased confusion or weakness     Call MD for:  temperature >100.4     Call MD for:  severe uncontrolled pain     Call MD for:  persistent nausea and vomiting or diarrhea     Call MD for:  redness, tenderness, or signs of infection (pain, swelling, redness, odor or green/yellow discharge around incision site)     Call MD for:  difficulty breathing or increased cough     Call MD for:  severe persistent headache     Call MD for:  worsening " rash     Call MD for:  persistent dizziness, light-headedness, or visual disturbances     Call MD for:  increased confusion or weakness       Follow-up Information     Call Ascencion Cedeño MD.    Specialty:  Internal Medicine  Why:  You need to be seen in 1-2 weeks by your doctor. Please call them for an appointment on 5/30/17 as they are closed on 5/29/17.  Contact information:  [address]  [place] [address]  [phone]             Marianne Koehler MD On 8/16/2017.    Specialty:  Neurology  Why:  3:15pm, patient should arrive for 2:45pm  Contact information:  [address]  [address]  [facility] [address]  [phone]             [facility].    Specialty:  Home Health Services  Why:  Home Health  Contact information:  [address] [facility]  [place] [address]  [phone]             Meek Ibarra MD. Go on 6/29/2017.    Specialty:  Neurology  Why:  @1:40pm, 7th floor clinic tower at [facility]  Contact information:  [address] [facility] [address]  [phone]                 Activity: activity as tolerated  Diet: cardiac diet    Signed:  Quirino Pulido MD  5/30/2017  2:15 PM

## 2017-05-30 NOTE — PATIENT INSTRUCTIONS
Discharge Instructions for Stroke  You have been diagnosed with a stroke, or with a TIA (transient ischemic attack). Or you have been identified as having a high risk for stroke. During a stroke, blood stops flowing to part of your brain. This can damage areas in the brain that control other parts of the body. Symptoms after a stroke depend on which part of the brain has been affected.  Stroke risk factors  Once youve had a stroke, youre at greater risk for another one. Listed below are some other factors that can increase your risk for a stroke:  · High blood pressure  · High cholesterol  · Cigarette or cigar smoking  · Diabetes  · Carotid or other artery disease  · Atrial fibrillation, atrial flutter, or other heart disease  · Not being physically active  · Obesity  · Certain blood disorders (such as sickle cell anemia)  · Excessive alcohol use  · Abuse of street drugs  · Race  · Gender  · Family history of stroke  · Diet high in salty, fried, or greasy foods  Changes in daily living  Doing your regular tasks may be difficult after youve had a stroke, but you can learn new ways to manage your daily activities. In fact, doing daily activities may help you to regain muscle strength and bring back function to affected limbs. Be patient, give yourself time to adjust, and appreciate the progress you make.  Daily activities  You may be at risk of falling. Make changes to your home to help you walk more easily. A therapist will decide if you need an assistive device to walk safely.  You may need to see an occupational therapist or physical therapist to learn new ways of doing things. For example, you may need to make adjustments when bathing or dressing:  Tips for showering or bathing  · Test the water temperature with a hand or foot that was not affected by the stroke.  · Use grab bars, a shower seat, a hand-held showerhead, and a long-handled brush.  Tips for getting dressed  · Dress while sitting, starting with  the affected side or limb.  · Wear shirts that pull easily over your head. Wear pants or skirts with elastic waistbands.  · Use zippers with loops attached to the pull tabs.  Lifestyle changes  · Take your medicines exactly as directed. Dont skip doses.  · Begin an exercise program. Ask your provider how to get started. Also ask how much activity you should try to get on a daily or weekly basis. You can benefit from simple activities such as walking or gardening.  · Limit alcohol intake. Men should have no more than 2 alcoholic drinks a day. Women should limit themselves to 1 alcoholic drink per day.  · Know your cholesterol level. Follow your providers recommendations about how to keep cholesterol under control.  · If you are a smoker, quit now. Join a stop-smoking program to improve your chances of success. Ask your provider about medicines or other methods to help you quit.  · Learn stress management techniques to help you deal with stress in your home and work life.  Diet  Your healthcare provider will give you information on dietary changes that you may need to make, based on your situation. Your provider may recommend that you see a registered dietitian for help with diet changes. Changes may include:  · Reducing fat and cholesterol intake  · Reducing salt (sodium) intake, especially if you have high blood pressure  · Eating more fresh vegetables and fruits  · Eating more lean proteins, such as fish, poultry, and beans and peas (legumes)  · Eating less red meat and processed meats  · Using low-fat dairy products  · Limiting vegetable oils and nut oils  · Limiting sweets and processed foods such as chips, cookies, and baked goods  Follow-up care  · Keep your medical appointments. Close follow-up is important to stroke rehabilitation and recovery.  · Some medicines require blood tests to check for progress or problems. Keep follow-up appointments for any blood tests ordered by your providers.  When to call  911  Call 911 right away if you have any of the following symptoms of stroke:  · Weakness, tingling, or loss of feeling on one side of your face or body  · Sudden double vision or trouble seeing in one or both eyes  · Sudden trouble talking or slurred speech  · Trouble understanding others  · Sudden, severe headache  · Dizziness, loss of balance, or a sense of falling  · Blackouts or seizures      F.A.S.T. is an easy way to remember the signs of stroke. When you see these signs, you know that you need to call 911 fast.  F.A.S.T. stands for:  · F is for face drooping. One side of the face is drooping or numb. When the person smiles, the smile is uneven.  · A is for arm weakness. One arm is weak or numb. When the person lifts both arms at the same time, one arm may drift downward.  · S is for speech difficulty. You may notice slurred speech or trouble speaking. The person can't repeat a simple sentence correctly when asked.  · T is for time to call 911. If someone shows any of these symptoms, even if they go away, call 911 immediately. Make note of the time the symptoms first appeared.  Date Last Reviewed: 8/26/2017 © 2000-2016 [employer]. [address] [place] [address], [place] [address]. All rights reserved. This information is not intended as a substitute for professional medical care. Always follow your healthcare professional's instructions.

## 2017-05-30 NOTE — PROGRESS NOTES
TN spoke with daughter, made new appointment with [facility] and told daughter via phone, verbalized understanding.  Meek Ibarra MD  Go on 6/29/2017  @1:40pm, 7th floor clinic [address] [address]  [phone]

## 2017-05-30 NOTE — PT/OT/SLP DISCHARGE
Occupational Therapy Discharge Summary    Aleksandr Schultz  MRN: [id number]   Cerebrovascular accident (CVA)   Patient Discharged from acute Occupational Therapy on 5/30/17.  Please refer to prior OT note dated on 5/29/17 for functional status.     Assessment:   Patient appropriate for care in another setting.  GOALS:    Occupational Therapy Goals        Problem: Occupational Therapy Goal    Goal Priority Disciplines Outcome Interventions   Occupational Therapy Goal     OT, PT/OT Ongoing (interventions implemented as appropriate)    Description:  Goals to be met by: 6/27/2017     Patient will increase functional independence with ADLs by performing:    UE Dressing with Modified [place].  LE Dressing with Modified [place].  Grooming while standing at sink with Modified [place].  Toileting from bedside commode with Modified [place] for hygiene and clothing management.   Bathing from  shower chair/bench with Supervision.  Stand pivot transfers with Modified [place].  Toilet transfer to bedside commode with Modified [place].    Pt. Has SPC used for community ambulation & rollator for household ambulation.  Has built-in bench in shower with GB.  Needs BSC to increase independence & safety in toilet T/F.  To benefit from OT/PT in SNF.                  Reasons for Discontinuation of Therapy Services  Transfer to alternate level of care.      Plan:  Patient Discharged to: Home with Home Health Service.      Khadra Martinez, OT  5/30/2017

## 2017-06-13 PROBLEM — E78.00 PURE HYPERCHOLESTEROLEMIA: Status: ACTIVE | Noted: 2017-06-13

## 2017-06-16 ENCOUNTER — OFFICE VISIT (OUTPATIENT)
Dept: CARDIOLOGY | Facility: CLINIC | Age: 82
End: 2017-06-16
Payer: MEDICARE

## 2017-06-16 VITALS
SYSTOLIC BLOOD PRESSURE: 154 MMHG | HEIGHT: 69 IN | DIASTOLIC BLOOD PRESSURE: 70 MMHG | HEART RATE: 53 BPM | WEIGHT: 171.31 LBS | BODY MASS INDEX: 25.37 KG/M2

## 2017-06-16 DIAGNOSIS — I63.9 CEREBROVASCULAR ACCIDENT (CVA), UNSPECIFIED MECHANISM: ICD-10-CM

## 2017-06-16 DIAGNOSIS — I71.40 ABDOMINAL AORTIC ANEURYSM (AAA) WITHOUT RUPTURE: Chronic | ICD-10-CM

## 2017-06-16 DIAGNOSIS — I77.9 BILATERAL CAROTID ARTERY DISEASE: Chronic | ICD-10-CM

## 2017-06-16 DIAGNOSIS — I49.5 SINUS BRADY-TACHY SYNDROME: ICD-10-CM

## 2017-06-16 DIAGNOSIS — E11.40 TYPE 2 DIABETES, CONTROLLED, WITH NEUROPATHY: ICD-10-CM

## 2017-06-16 DIAGNOSIS — G47.33 OSA ON CPAP: ICD-10-CM

## 2017-06-16 DIAGNOSIS — I35.1 NONRHEUMATIC AORTIC VALVE INSUFFICIENCY: ICD-10-CM

## 2017-06-16 DIAGNOSIS — I48.0 PAROXYSMAL ATRIAL FIBRILLATION: ICD-10-CM

## 2017-06-16 DIAGNOSIS — I15.2 HYPERTENSION ASSOCIATED WITH DIABETES: Primary | ICD-10-CM

## 2017-06-16 DIAGNOSIS — I35.0 NONRHEUMATIC AORTIC VALVE STENOSIS: ICD-10-CM

## 2017-06-16 DIAGNOSIS — E78.2 MIXED HYPERLIPIDEMIA: ICD-10-CM

## 2017-06-16 DIAGNOSIS — I73.9 PAD (PERIPHERAL ARTERY DISEASE): ICD-10-CM

## 2017-06-16 DIAGNOSIS — E11.59 HYPERTENSION ASSOCIATED WITH DIABETES: Primary | ICD-10-CM

## 2017-06-16 PROCEDURE — 99999 PR PBB SHADOW E&M-EST. PATIENT-LVL III: CPT | Mod: PBBFAC,,, | Performed by: INTERNAL MEDICINE

## 2017-06-16 PROCEDURE — 1159F MED LIST DOCD IN RCRD: CPT | Mod: S$GLB,,, | Performed by: INTERNAL MEDICINE

## 2017-06-16 PROCEDURE — 99214 OFFICE O/P EST MOD 30 MIN: CPT | Mod: S$GLB,,, | Performed by: INTERNAL MEDICINE

## 2017-06-16 PROCEDURE — 1125F AMNT PAIN NOTED PAIN PRSNT: CPT | Mod: S$GLB,,, | Performed by: INTERNAL MEDICINE

## 2017-06-16 RX ORDER — LISINOPRIL 20 MG/1
20 TABLET ORAL DAILY
COMMUNITY
End: 2017-06-23 | Stop reason: SDUPTHER

## 2017-06-18 PROBLEM — R79.89 POSITIVE D DIMER: Status: RESOLVED | Noted: 2017-05-02 | Resolved: 2017-06-18

## 2017-06-18 NOTE — PROGRESS NOTES
Subjective:   Patient ID:  Aleksandr Schultz is a 89 y.o. male who presents for follow-up of Atrial Fibrillation      HPI: Patient was recently hospitalized initially with atrial fibrillation, and subsequently with CVA. Patient was anticoagulated following initial admission for atrial fibrillation.  He is now on Amiodarone 200mg BID and Eliquis 2.5mhg BID.  Labetalol was changed to Visken and Pletal was discontinued. He has chronic symptoms of fatigue and GONZALEZ.  BP is not at goal and records from home indicate poor BP control.    Patient is sedentary.        Lab Results   Component Value Date     05/29/2017    K 3.8 05/29/2017     05/29/2017    CO2 25 05/29/2017    BUN 18 05/29/2017    CREATININE 1.2 05/29/2017     (H) 05/29/2017    HGBA1C 6.6 (H) 05/26/2017    MG 2.3 05/29/2017    AST 14 05/29/2017    ALT 20 05/29/2017    ALBUMIN 3.2 (L) 05/29/2017    PROT 6.7 05/29/2017    BILITOT 0.4 05/29/2017    WBC 7.65 05/29/2017    HGB 13.4 (L) 05/29/2017    HCT 39.8 (L) 05/29/2017    MCV 92 05/29/2017     05/29/2017    INR 1.0 05/02/2017    PSA 0.65 12/04/2012    TSH 5.370 (H) 05/26/2017    CHOL 167 05/26/2017    HDL 42 05/26/2017    LDLCALC 107.0 05/26/2017    TRIG 90 05/26/2017       Review of Systems   Constitution: Positive for weakness.   HENT: Negative.    Eyes: Negative.    Cardiovascular: Positive for dyspnea on exertion, irregular heartbeat, leg swelling and palpitations. Negative for chest pain, claudication, near-syncope and syncope.   Respiratory: Positive for cough and wheezing. Negative for shortness of breath and snoring.    Endocrine: Negative.  Negative for cold intolerance, heat intolerance, polydipsia, polyphagia and polyuria.   Skin: Negative.    Musculoskeletal: Positive for arthritis, back pain, muscle cramps and muscle weakness.   Gastrointestinal: Negative.    Genitourinary: Negative.    Neurological: Positive for dizziness, light-headedness, loss of balance and numbness.    Psychiatric/Behavioral: The patient is nervous/anxious.        Current Outpatient Prescriptions:     albuterol (PROVENTIL) 2.5 mg /3 mL (0.083 %) nebulizer solution, Take 2.5 mg by nebulization 4 (four) times daily as needed. , Disp: , Rfl:     amiodarone (PACERONE) 200 MG Tab, Take 1 tablet (200 mg total) by mouth 2 (two) times daily. (Patient taking differently: Take 200 mg by mouth once daily. ), Disp: 60 tablet, Rfl: 0    amlodipine (NORVASC) 10 MG tablet, TAKE 1 TABLET ONE TIME DAILY, Disp: 90 tablet, Rfl: 3    apixaban 2.5 mg Tab, Take 1 tablet (2.5 mg total) by mouth 2 (two) times daily., Disp: 60 tablet, Rfl: 0    aspirin (ECOTRIN) 81 MG EC tablet, Take 81 mg by mouth once daily.  , Disp: , Rfl:     atorvastatin (LIPITOR) 40 MG tablet, Take 40 mg by mouth once daily., Disp: , Rfl:     cinnamon bark (CINNAMON) 500 mg capsule, Take 500 mg by mouth once daily., Disp: , Rfl:     cyclobenzaprine (FLEXERIL) 10 MG tablet, Take 10 mg by mouth 3 (three) times daily as needed. Pt taking one pill at night., Disp: , Rfl:     folic acid (FOLVITE) 1 MG tablet, Take 1 mg by mouth once daily., Disp: , Rfl:     furosemide (LASIX) 40 MG tablet, TAKE 1 TABLET ONE TIME DAILY, Disp: 90 tablet, Rfl: 3    garlic 1,000 mg Cap, Take 1 tablet by mouth 2 (two) times daily. , Disp: , Rfl:     glimepiride (AMARYL) 4 MG tablet, Take 4 mg by mouth daily with breakfast. Pt taking one pill twice a day., Disp: , Rfl:     ipratropium (ATROVENT) 0.02 % nebulizer solution, Take 500 mcg by nebulization 4 (four) times daily. PRN, Disp: , Rfl:     lisinopril (PRINIVIL,ZESTRIL) 20 MG tablet, Take 20 mg by mouth once daily., Disp: , Rfl:     loratadine 10 mg Cap, Take 1 tablet by mouth once daily. , Disp: , Rfl:     metformin (GLUCOPHAGE) 500 MG tablet, Take 500 mg by mouth daily with breakfast., Disp: , Rfl:     mometasone (NASONEX) 50 mcg/actuation nasal spray, 2 sprays by Nasal route daily as needed. , Disp: , Rfl:      "nitroGLYCERIN (NITROSTAT) 0.4 MG SL tablet, Place 0.4 mg under the tongue every 5 (five) minutes as needed (PRN). , Disp: , Rfl:     pindolol (VISKEN) 10 MG tablet, Take 1 tablet (10 mg total) by mouth 2 (two) times daily., Disp: 60 tablet, Rfl: 0    potassium chloride (MICRO-K) 10 MEQ CpSR, Take 1 capsule (10 mEq total) by mouth once daily., Disp: 90 capsule, Rfl: 3    ranitidine (ZANTAC) 150 MG tablet, Take 150 mg by mouth 2 (two) times daily. Pt taking prn., Disp: , Rfl:     tamsulosin (FLOMAX) 0.4 mg Cp24, Take 0.4 mg by mouth once daily., Disp: , Rfl:     vitamin D 1000 units Tab, Take 1,000 Units by mouth once daily., Disp: , Rfl:     Objective:   Physical Exam   Constitutional: He is oriented to person, place, and time. He appears well-developed and well-nourished.   BP (!) 154/70   Pulse (!) 53   Ht 5' 9" (1.753 m)   Wt 77.7 kg (171 lb 4.8 oz)   BMI 25.30 kg/m²      HENT:   Head: Normocephalic.   Eyes: Pupils are equal, round, and reactive to light.   Neck: Normal range of motion. Neck supple. Carotid bruit is not present. No thyromegaly present.   Cardiovascular: Normal rate and regular rhythm.  Exam reveals no gallop and no friction rub.    Murmur heard.   Early systolic murmur is present with a grade of 1/6  at the upper right sternal border radiating to the neck  Pulses:       Carotid pulses are 2+ on the right side, and 2+ on the left side.       Radial pulses are 2+ on the right side, and 2+ on the left side.        Femoral pulses are 2+ on the right side, and 2+ on the left side.       Popliteal pulses are 2+ on the right side, and 2+ on the left side.        Dorsalis pedis pulses are 1+ on the right side, and 1+ on the left side.        Posterior tibial pulses are 1+ on the right side, and 1+ on the left side.   Pulmonary/Chest: Effort normal and breath sounds normal. No respiratory distress. He has no wheezes. He has no rales. He exhibits no tenderness.   Abdominal: Soft. Bowel sounds are " normal.   Musculoskeletal: Normal range of motion. He exhibits no edema.   Neurological: He is alert and oriented to person, place, and time.   Skin: Skin is warm and dry.   Psychiatric: He has a normal mood and affect.   Nursing note and vitals reviewed.      Assessment and Plan:     Problem List Items Addressed This Visit        Cardiology Problems    AAA (abdominal aortic aneurysm) (Chronic)    Bilateral carotid artery disease (Chronic)    PAD (peripheral artery disease)    Nonrheumatic aortic valve stenosis    Hyperlipidemia    Paroxysmal atrial fibrillation    Sinus noemy-tachy syndrome    Nonrheumatic aortic valve insufficiency, mild to moderate    Cerebrovascular accident (CVA)    Hypertension associated with diabetes - Primary    Relevant Orders    Basic metabolic panel       Other    Type 2 diabetes, controlled, with neuropathy    MICHELLE on CPAP      Other Visit Diagnoses    None.     Increase Lisinopril and check BMP in 1-2 weeks.  Decrease amiodarone to 200 mg QD.  D/c Fish Oils and Flaxseed oils.  Continue Eliquis and Visken.    Return in about 6 months (around 12/16/2017).

## 2017-06-23 ENCOUNTER — LAB VISIT (OUTPATIENT)
Dept: LAB | Facility: HOSPITAL | Age: 82
End: 2017-06-23
Attending: INTERNAL MEDICINE
Payer: MEDICARE

## 2017-06-23 ENCOUNTER — PATIENT MESSAGE (OUTPATIENT)
Dept: CARDIOLOGY | Facility: CLINIC | Age: 82
End: 2017-06-23

## 2017-06-23 ENCOUNTER — TELEPHONE (OUTPATIENT)
Dept: CARDIOLOGY | Facility: CLINIC | Age: 82
End: 2017-06-23

## 2017-06-23 DIAGNOSIS — I15.2 HYPERTENSION ASSOCIATED WITH DIABETES: Primary | ICD-10-CM

## 2017-06-23 DIAGNOSIS — E11.59 HYPERTENSION ASSOCIATED WITH DIABETES: Primary | ICD-10-CM

## 2017-06-23 DIAGNOSIS — E87.6 HYPOKALEMIA: ICD-10-CM

## 2017-06-23 LAB
ANION GAP SERPL CALC-SCNC: 11 MMOL/L
BUN SERPL-MCNC: 35 MG/DL
CALCIUM SERPL-MCNC: 9.3 MG/DL
CHLORIDE SERPL-SCNC: 106 MMOL/L
CO2 SERPL-SCNC: 24 MMOL/L
CREAT SERPL-MCNC: 1.5 MG/DL
EST. GFR  (AFRICAN AMERICAN): 47 ML/MIN/1.73 M^2
EST. GFR  (NON AFRICAN AMERICAN): 40 ML/MIN/1.73 M^2
GLUCOSE SERPL-MCNC: 136 MG/DL
POTASSIUM SERPL-SCNC: 4.9 MMOL/L
SODIUM SERPL-SCNC: 141 MMOL/L

## 2017-06-23 PROCEDURE — 36415 COLL VENOUS BLD VENIPUNCTURE: CPT

## 2017-06-23 PROCEDURE — 80048 BASIC METABOLIC PNL TOTAL CA: CPT

## 2017-06-23 RX ORDER — LISINOPRIL 20 MG/1
20 TABLET ORAL DAILY
Qty: 90 TABLET | Refills: 3 | Status: SHIPPED | OUTPATIENT
Start: 2017-06-23 | End: 2017-06-26 | Stop reason: DRUGHIGH

## 2017-06-23 NOTE — TELEPHONE ENCOUNTER
----- Message from Lynne Buckner MD sent at 6/23/2017  2:55 PM CDT -----  Blood work indicates worsened kidney function and increased potassium on a higher dose of Lisinopril.  We have to lower it back to 10 mg daily and keep checking BP. If still elevated we may have to change Visken back to Labetalol.

## 2017-06-25 ENCOUNTER — PATIENT MESSAGE (OUTPATIENT)
Dept: CARDIOLOGY | Facility: CLINIC | Age: 82
End: 2017-06-25

## 2017-06-26 ENCOUNTER — TELEPHONE (OUTPATIENT)
Dept: CARDIOLOGY | Facility: CLINIC | Age: 82
End: 2017-06-26

## 2017-06-26 ENCOUNTER — DOCUMENTATION ONLY (OUTPATIENT)
Dept: CARDIOLOGY | Facility: CLINIC | Age: 82
End: 2017-06-26

## 2017-06-26 DIAGNOSIS — E11.59 HYPERTENSION ASSOCIATED WITH DIABETES: ICD-10-CM

## 2017-06-26 DIAGNOSIS — I15.2 HYPERTENSION ASSOCIATED WITH DIABETES: ICD-10-CM

## 2017-06-26 RX ORDER — LISINOPRIL 10 MG/1
10 TABLET ORAL DAILY
COMMUNITY
End: 2017-06-26 | Stop reason: SDUPTHER

## 2017-06-26 RX ORDER — AMIODARONE HYDROCHLORIDE 200 MG/1
200 TABLET ORAL DAILY
Qty: 90 TABLET | Refills: 3 | Status: SHIPPED | OUTPATIENT
Start: 2017-06-26 | End: 2018-01-08 | Stop reason: SDUPTHER

## 2017-06-26 RX ORDER — LISINOPRIL 10 MG/1
TABLET ORAL
Qty: 90 TABLET | Refills: 3 | Status: SHIPPED | OUTPATIENT
Start: 2017-06-26 | End: 2018-01-08 | Stop reason: SDUPTHER

## 2017-06-26 NOTE — TELEPHONE ENCOUNTER
José Miguel stated that pt is not allergic to amioderone and that pt has been taking it w/o a problem.

## 2017-06-26 NOTE — TELEPHONE ENCOUNTER
Pt's daughter called stating taht pt was taking Linsinopril 30mg for a week before he had blood drawn. José Miguel thinks this is why his blood work was abnormal. José Miguel would like to know if you think this caused his kidney functions to be worse. Please advise.

## 2017-07-07 ENCOUNTER — LAB VISIT (OUTPATIENT)
Dept: LAB | Facility: HOSPITAL | Age: 82
End: 2017-07-07
Attending: INTERNAL MEDICINE
Payer: MEDICARE

## 2017-07-07 DIAGNOSIS — E11.59 HYPERTENSION ASSOCIATED WITH DIABETES: ICD-10-CM

## 2017-07-07 DIAGNOSIS — I15.2 HYPERTENSION ASSOCIATED WITH DIABETES: ICD-10-CM

## 2017-07-07 LAB
ANION GAP SERPL CALC-SCNC: 11 MMOL/L
BUN SERPL-MCNC: 33 MG/DL
CALCIUM SERPL-MCNC: 9 MG/DL
CHLORIDE SERPL-SCNC: 106 MMOL/L
CO2 SERPL-SCNC: 24 MMOL/L
CREAT SERPL-MCNC: 1.5 MG/DL
EST. GFR  (AFRICAN AMERICAN): 47 ML/MIN/1.73 M^2
EST. GFR  (NON AFRICAN AMERICAN): 40 ML/MIN/1.73 M^2
GLUCOSE SERPL-MCNC: 90 MG/DL
POTASSIUM SERPL-SCNC: 4.5 MMOL/L
SODIUM SERPL-SCNC: 141 MMOL/L

## 2017-07-07 PROCEDURE — 36415 COLL VENOUS BLD VENIPUNCTURE: CPT

## 2017-07-07 PROCEDURE — 80048 BASIC METABOLIC PNL TOTAL CA: CPT

## 2017-07-10 ENCOUNTER — TELEPHONE (OUTPATIENT)
Dept: CARDIOLOGY | Facility: CLINIC | Age: 82
End: 2017-07-10

## 2017-07-10 DIAGNOSIS — E11.59 HYPERTENSION ASSOCIATED WITH DIABETES: ICD-10-CM

## 2017-07-10 DIAGNOSIS — I15.2 HYPERTENSION ASSOCIATED WITH DIABETES: ICD-10-CM

## 2017-07-24 ENCOUNTER — OFFICE VISIT (OUTPATIENT)
Dept: NEUROLOGY | Facility: CLINIC | Age: 82
End: 2017-07-24
Payer: MEDICARE

## 2017-07-24 VITALS
RESPIRATION RATE: 18 BRPM | HEIGHT: 69 IN | WEIGHT: 172.81 LBS | HEART RATE: 56 BPM | DIASTOLIC BLOOD PRESSURE: 60 MMHG | BODY MASS INDEX: 25.6 KG/M2 | SYSTOLIC BLOOD PRESSURE: 160 MMHG

## 2017-07-24 DIAGNOSIS — E78.5 DYSLIPIDEMIA: ICD-10-CM

## 2017-07-24 DIAGNOSIS — I69.30 LATE EFFECT OF CEREBROVASCULAR ACCIDENT (CVA): Primary | ICD-10-CM

## 2017-07-24 DIAGNOSIS — I48.19 PERSISTENT ATRIAL FIBRILLATION: ICD-10-CM

## 2017-07-24 DIAGNOSIS — I10 ESSENTIAL HYPERTENSION: ICD-10-CM

## 2017-07-24 DIAGNOSIS — E11.40 TYPE 2 DIABETES, CONTROLLED, WITH NEUROPATHY: ICD-10-CM

## 2017-07-24 PROCEDURE — 1125F AMNT PAIN NOTED PAIN PRSNT: CPT | Mod: S$GLB,,, | Performed by: PSYCHIATRY & NEUROLOGY

## 2017-07-24 PROCEDURE — 99204 OFFICE O/P NEW MOD 45 MIN: CPT | Mod: S$GLB,,, | Performed by: PSYCHIATRY & NEUROLOGY

## 2017-07-24 PROCEDURE — 99999 PR PBB SHADOW E&M-EST. PATIENT-LVL III: CPT | Mod: PBBFAC,,, | Performed by: PSYCHIATRY & NEUROLOGY

## 2017-07-24 PROCEDURE — 1159F MED LIST DOCD IN RCRD: CPT | Mod: S$GLB,,, | Performed by: PSYCHIATRY & NEUROLOGY

## 2017-07-24 NOTE — PROGRESS NOTES
"    HPI: Aleksandr Schultz is a 89 y.o. male with history of stroke in 5/2017. Review of chart shows that patient noted right sided numbness and weakness.   Patient denied impaired speech and trouble swallowing.  Patient was transferred to us from [facility] ED to [facility] for MRI and stroke workup which revealed acute left corona radiata stroke. Neurology was consulted and recommended to continue ASA and Eliquis (for Afib) and statin therapy. It is also noted, however, that " discussion with the family and the patient regarding anticoagulation and the decision was made to resume ASA and statin therapy" Statin was started that admission and he was continued on ASA  NOAC was maintained (was only started 2 weeks prior)  He still has some right sided weakness/ numbness.   Some ringing in the ears since the stroke  Note his history of MICHELLE and uses CPAP  He had stroke remotely in 1990- also with right sided weakness.   Patient has a history of chronic lumbar pain treated with OTC meds  The patient was in PT for 6 weeks post CVA  The patient continues local driving since CVA (locally) and is doing well without accidents or incidents.   He follows regularly with cardiology    Review of Systems   Constitutional: Negative for fever.   HENT: Negative for nosebleeds.    Eyes: Negative for double vision.   Respiratory: Negative for hemoptysis.    Cardiovascular: Negative for leg swelling.   Gastrointestinal: Negative for blood in stool.   Musculoskeletal: Positive for back pain.   Skin: Negative for rash.   Neurological: Negative for seizures.   Psychiatric/Behavioral: Negative for memory loss.         I have reviewed all of this patient's past medical and surgical histories as well as family and social histories and active allergies and medications as documented in the electronic medical record.        Exam:  Gen Appearance, well developed/nourished in no apparent distress  CV: 2+ distal pulses with no edema or " swelling  Neuro:  MS: Awake, alert, oriented to place, person, time, situation. Sustains attention. Recent/remote memory intact, Language is full to spontaneous speech/repetition/naming/comprehension. Fund of Knowledge is full  CN: Optic discs are flat with normal vasculature, PERRL, Extraoccular movements and visual fields are full. Normal facial sensation and strength, Hearing symmetric, Tongue and Palate are midline and strong. Shoulder Shrug symmetric and strong.  Motor: Normal bulk, tone, no abnormal movements. 5/5 strength bilateral upper/lower extremities with 2+ reflexes but right pronator drift and no clonus  Sensory: symmetric to light touch, pain, temp, and vibration/proprioception. Romberg negative  Cerebellar: Finger-nose,Heal-shin, Rapid alternating movements intact  Gait: Normal stance, no ataxia but using cane due to mild right hemiparesis.     Imagin2017 MRI brain: Small acute infarct in the left corona radiata.    Changes of chronic small vessel ischemic disease and cerebral volume loss.    Right maxillary sinus disease      2017 Carotid US: 1. Status post right carotid endarterectomy normal velocities.    2.  Atherosclerosis with no evidence of flow-limiting carotid stenosis greater than 50%.    Echo 2017:  1 - No wall motion abnormalities.     2 - Normal left ventricular systolic function (EF 55-60%).     3 - Impaired LV relaxation, normal LAP (grade 1 diastolic dysfunction).     4 - Normal right ventricular systolic function .     5 - The estimated PA systolic pressure is 24 mmHg.     6 - Mild to moderate aortic regurgitation.     7 - No evidence of intracardiac shunt.     Labs: 2017: , B12 normal and A1C less than 7  CKD with creatinine 1.5 2017 noted    Assessment/Plan: Aleksandr Schultz is a 89 y.o. male with afib and small acute left corona radiata CVA in 2017 which resulted in right sided numbness and weakness. Patient had just started NOAC for afib 2 weeks prior to this.      I recommend:   1. For CVA prevention; ASA and NOAC to continue  as well as statin (goal LDL is 100) started at CVA admission as well as HTN medications (goal BP is 130/80). Continue A1C for DM2- A1C at goal less than 7. Continue CPAP for ASA  2. Patient hs completed PT post CVA and he will continue to ambulate with cane  3.  The patient was instructed to dial 911 for any signs or symptoms of stroke such as sudden weakness, numbness, dizziness, speech, gait, or visual changes    RTC 6 months

## 2017-08-03 ENCOUNTER — TELEPHONE (OUTPATIENT)
Dept: NEUROLOGY | Facility: CLINIC | Age: 82
End: 2017-08-03

## 2017-08-03 NOTE — TELEPHONE ENCOUNTER
I was asked for a surgical clearance on this patient by oral surgery.  I would recommend the patient's cardiologist participate in this clearance, given the patient's use of anti-coagulation as followed by those providers.

## 2017-08-03 NOTE — TELEPHONE ENCOUNTER
Notified Michelle with 's office, stated she will get in touch with patient about his cardiologist since her is being followed for his ASA and Eliquis for A-Fib.

## 2017-08-04 ENCOUNTER — TELEPHONE (OUTPATIENT)
Dept: CARDIOLOGY | Facility: CLINIC | Age: 82
End: 2017-08-04

## 2017-08-04 NOTE — TELEPHONE ENCOUNTER
----- Message from Amanda Cedeño sent at 8/4/2017 11:54 AM CDT -----  Contact: Sylvie/Dr. Rashel Mercer has requested a call to discuss receiving a medical clearance for Pt dental procedure. Her call back number is [phone].        Thanks

## 2017-08-04 NOTE — TELEPHONE ENCOUNTER
Sylvie from Dr. Kiser's office called stating pt is having teeth numbers 8 and 9 extracted on 12/11/17 and would like to know if you can give pt clearance now or if pt should wait until it's closer to the extractions get clearance. Sylvie stated that they spoke w/Dr. Gonzalez about clearance and was advised to call you.  Please advise.

## 2017-08-07 ENCOUNTER — PATIENT MESSAGE (OUTPATIENT)
Dept: CARDIOLOGY | Facility: CLINIC | Age: 82
End: 2017-08-07

## 2017-10-31 ENCOUNTER — TELEPHONE (OUTPATIENT)
Dept: CARDIOLOGY | Facility: CLINIC | Age: 82
End: 2017-10-31

## 2017-10-31 DIAGNOSIS — I15.2 HYPERTENSION ASSOCIATED WITH DIABETES: ICD-10-CM

## 2017-10-31 DIAGNOSIS — E11.59 HYPERTENSION ASSOCIATED WITH DIABETES: ICD-10-CM

## 2017-10-31 DIAGNOSIS — E78.2 MIXED HYPERLIPIDEMIA: Primary | ICD-10-CM

## 2017-10-31 DIAGNOSIS — E11.40 TYPE 2 DIABETES, CONTROLLED, WITH NEUROPATHY: ICD-10-CM

## 2017-11-29 DIAGNOSIS — E87.6 HYPOKALEMIA: ICD-10-CM

## 2017-11-29 RX ORDER — POTASSIUM CHLORIDE 750 MG/1
CAPSULE, EXTENDED RELEASE ORAL
Qty: 90 CAPSULE | Refills: 3 | Status: SHIPPED | OUTPATIENT
Start: 2017-11-29 | End: 2019-01-19 | Stop reason: SDUPTHER

## 2017-11-29 RX ORDER — FUROSEMIDE 40 MG/1
TABLET ORAL
Qty: 90 TABLET | Refills: 3 | Status: SHIPPED | OUTPATIENT
Start: 2017-11-29 | End: 2019-01-19 | Stop reason: SDUPTHER

## 2017-12-02 NOTE — SUBJECTIVE & OBJECTIVE
Interval note: feeling well this am, no complaints.     Review of Systems   Constitutional: Negative for chills and fever.   HENT: Positive for hearing loss. Negative for congestion, postnasal drip and sore throat.    Eyes: Negative for photophobia.   Respiratory: Negative for chest tightness and shortness of breath.    Cardiovascular: Negative for chest pain and leg swelling.        Leg edema better     Gastrointestinal: Negative for abdominal distention, abdominal pain, blood in stool and vomiting.   Genitourinary: Negative for dysuria, flank pain and hematuria.   Musculoskeletal: Positive for back pain. Negative for gait problem and myalgias.   Skin: Negative for pallor.   Neurological: Negative for dizziness, seizures, facial asymmetry, speech difficulty and numbness.   Hematological: Does not bruise/bleed easily.   Psychiatric/Behavioral: Negative for agitation and suicidal ideas. The patient is not nervous/anxious.      Objective:     Vital Signs (Most Recent):  Temp: 97.1 °F (36.2 °C) (05/04/17 0719)  Pulse: (!) 56 (05/04/17 1000)  Resp: 16 (05/04/17 0809)  BP: (!) 168/76 (05/04/17 0719)  SpO2: 97 % (05/04/17 0809) Vital Signs (24h Range):  Temp:  [96.5 °F (35.8 °C)-98.3 °F (36.8 °C)] 97.1 °F (36.2 °C)  Pulse:  [48-65] 56  Resp:  [16-18] 16  SpO2:  [92 %-97 %] 97 %  BP: (135-168)/(62-76) 168/76     Weight: 82.6 kg (182 lb 1.6 oz)  Body mass index is 26.89 kg/(m^2).    Physical Exam   Constitutional: He is oriented to person, place, and time. He appears well-developed and well-nourished.   HENT:   Head: Normocephalic and atraumatic.   Nose: Nose normal.   Mouth/Throat: Oropharynx is clear and moist.   Bilateral hearing aids   Eyes: Conjunctivae and EOM are normal. Pupils are equal, round, and reactive to light.   Neck: Normal range of motion. Neck supple. No JVD present. No thyromegaly present.   Cardiovascular: Normal rate and intact distal pulses.    Murmur heard.  Pulmonary/Chest: Effort normal and breath  sounds normal.   Abdominal: Soft. Bowel sounds are normal. He exhibits no distension and no mass. There is no tenderness. There is no guarding.   Musculoskeletal: Normal range of motion. He exhibits no edema.   Lymphadenopathy:     He has no cervical adenopathy.   Neurological: He is alert and oriented to person, place, and time. He has normal reflexes. No cranial nerve deficit.   Skin: Skin is warm and dry. No rash noted. No pallor.   Psychiatric: He has a normal mood and affect.   Nursing note and vitals reviewed.       Significant Labs:   CBC:     Recent Labs  Lab 05/03/17 0318   WBC 4.76   HGB 12.1*   HCT 35.9*        CMP:     Recent Labs  Lab 05/03/17 0318      K 3.6      CO2 24   *   BUN 14   CREATININE 0.9   CALCIUM 8.8   PROT 6.4   ALBUMIN 3.2*   BILITOT 0.4   ALKPHOS 82   AST 14   ALT 17   ANIONGAP 10   EGFRNONAA >60   Mag 2.0  Troponin:     Recent Labs  Lab 05/02/17  1534 05/02/17  2124 05/03/17  0318   TROPONINI 0.006 0.011 0.011     Lab Results   Component Value Date    DDIMER 1.12 (H) 05/02/2017     Lab Results   Component Value Date    INR 1.0 05/02/2017       TSH:     Recent Labs  Lab 05/02/17  0933   TSH 3.204     BNP    Recent Labs  Lab 05/02/17  0932   *     Significant Imaging:   Chest, 1 view: The heart is normal in size.  Calcified atheromatous disease affects the aorta.  There is congestion of the pulmonary vascularity.  No consolidation.  There may be small bilateral pleural effusions.  Age-appropriate degenerative changes affect the skeleton.    4/20 echo      Ref Range & Units 13d ago   4yr ago       EF 55 - 65 60 65R    Diastolic Dysfunction  Yes (A)     Aortic Valve Regurgitation  MILD TO MODERATE (A)     Aortic Valve Stenosis  MILD (A)     Est. PA Systolic Pressure  35.95     Mitral Valve Mobility  NORMAL     Tricuspid Valve Regurgitation  MILD      EKGs reviewed   oral

## 2017-12-11 RX ORDER — AMLODIPINE BESYLATE 10 MG/1
TABLET ORAL
Qty: 90 TABLET | Refills: 3 | Status: SHIPPED | OUTPATIENT
Start: 2017-12-11 | End: 2019-01-19 | Stop reason: SDUPTHER

## 2018-01-03 ENCOUNTER — LAB VISIT (OUTPATIENT)
Dept: LAB | Facility: HOSPITAL | Age: 83
End: 2018-01-03
Attending: INTERNAL MEDICINE
Payer: MEDICARE

## 2018-01-03 DIAGNOSIS — E11.59 HYPERTENSION ASSOCIATED WITH DIABETES: ICD-10-CM

## 2018-01-03 DIAGNOSIS — E11.40 TYPE 2 DIABETES, CONTROLLED, WITH NEUROPATHY: ICD-10-CM

## 2018-01-03 DIAGNOSIS — I15.2 HYPERTENSION ASSOCIATED WITH DIABETES: ICD-10-CM

## 2018-01-03 DIAGNOSIS — E78.2 MIXED HYPERLIPIDEMIA: ICD-10-CM

## 2018-01-03 LAB
ALBUMIN SERPL BCP-MCNC: 3.7 G/DL
ALP SERPL-CCNC: 113 U/L
ALT SERPL W/O P-5'-P-CCNC: 47 U/L
ANION GAP SERPL CALC-SCNC: 11 MMOL/L
AST SERPL-CCNC: 27 U/L
BILIRUB SERPL-MCNC: 0.6 MG/DL
BUN SERPL-MCNC: 25 MG/DL
CALCIUM SERPL-MCNC: 9.2 MG/DL
CHLORIDE SERPL-SCNC: 105 MMOL/L
CHOLEST SERPL-MCNC: 171 MG/DL
CHOLEST/HDLC SERPL: 3.6 {RATIO}
CO2 SERPL-SCNC: 27 MMOL/L
CREAT SERPL-MCNC: 1.6 MG/DL
EST. GFR  (AFRICAN AMERICAN): 43 ML/MIN/1.73 M^2
EST. GFR  (NON AFRICAN AMERICAN): 37 ML/MIN/1.73 M^2
ESTIMATED AVG GLUCOSE: 146 MG/DL
GLUCOSE SERPL-MCNC: 112 MG/DL
HBA1C MFR BLD HPLC: 6.7 %
HDLC SERPL-MCNC: 48 MG/DL
HDLC SERPL: 28.1 %
LDLC SERPL CALC-MCNC: 105 MG/DL
NONHDLC SERPL-MCNC: 123 MG/DL
POTASSIUM SERPL-SCNC: 4.5 MMOL/L
PROT SERPL-MCNC: 7.2 G/DL
SODIUM SERPL-SCNC: 143 MMOL/L
T4 FREE SERPL-MCNC: 1.41 NG/DL
TRIGL SERPL-MCNC: 90 MG/DL
TSH SERPL DL<=0.005 MIU/L-ACNC: 4.04 UIU/ML

## 2018-01-03 PROCEDURE — 36415 COLL VENOUS BLD VENIPUNCTURE: CPT

## 2018-01-03 PROCEDURE — 80061 LIPID PANEL: CPT

## 2018-01-03 PROCEDURE — 80053 COMPREHEN METABOLIC PANEL: CPT

## 2018-01-03 PROCEDURE — 83036 HEMOGLOBIN GLYCOSYLATED A1C: CPT

## 2018-01-03 PROCEDURE — 84443 ASSAY THYROID STIM HORMONE: CPT

## 2018-01-03 PROCEDURE — 84439 ASSAY OF FREE THYROXINE: CPT

## 2018-01-04 ENCOUNTER — TELEPHONE (OUTPATIENT)
Dept: CARDIOLOGY | Facility: CLINIC | Age: 83
End: 2018-01-04

## 2018-01-04 NOTE — TELEPHONE ENCOUNTER
----- Message from Lynne Buckner MD sent at 1/4/2018 11:20 AM CST -----  Please review.  We will discuss the results during your upcoming visit with me. It is essentially unchanged from the prior one.

## 2018-01-08 ENCOUNTER — OFFICE VISIT (OUTPATIENT)
Dept: CARDIOLOGY | Facility: CLINIC | Age: 83
End: 2018-01-08
Payer: MEDICARE

## 2018-01-08 VITALS
BODY MASS INDEX: 25.81 KG/M2 | HEART RATE: 53 BPM | DIASTOLIC BLOOD PRESSURE: 79 MMHG | WEIGHT: 174.25 LBS | HEIGHT: 69 IN | SYSTOLIC BLOOD PRESSURE: 141 MMHG

## 2018-01-08 DIAGNOSIS — I15.2 HYPERTENSION ASSOCIATED WITH DIABETES: ICD-10-CM

## 2018-01-08 DIAGNOSIS — I35.1 NONRHEUMATIC AORTIC VALVE INSUFFICIENCY: Primary | ICD-10-CM

## 2018-01-08 DIAGNOSIS — I63.9 CEREBROVASCULAR ACCIDENT (CVA), UNSPECIFIED MECHANISM: ICD-10-CM

## 2018-01-08 DIAGNOSIS — I35.0 NONRHEUMATIC AORTIC VALVE STENOSIS: ICD-10-CM

## 2018-01-08 DIAGNOSIS — I49.5 SINUS BRADY-TACHY SYNDROME: ICD-10-CM

## 2018-01-08 DIAGNOSIS — I77.9 BILATERAL CAROTID ARTERY DISEASE: Chronic | ICD-10-CM

## 2018-01-08 DIAGNOSIS — I48.0 PAROXYSMAL ATRIAL FIBRILLATION: ICD-10-CM

## 2018-01-08 DIAGNOSIS — I73.9 PAD (PERIPHERAL ARTERY DISEASE): ICD-10-CM

## 2018-01-08 DIAGNOSIS — E11.59 HYPERTENSION ASSOCIATED WITH DIABETES: ICD-10-CM

## 2018-01-08 DIAGNOSIS — J43.8 OTHER EMPHYSEMA: Chronic | ICD-10-CM

## 2018-01-08 DIAGNOSIS — E78.2 MIXED HYPERLIPIDEMIA: ICD-10-CM

## 2018-01-08 DIAGNOSIS — I71.40 ABDOMINAL AORTIC ANEURYSM (AAA) WITHOUT RUPTURE: Chronic | ICD-10-CM

## 2018-01-08 PROCEDURE — 99214 OFFICE O/P EST MOD 30 MIN: CPT | Mod: S$GLB,,, | Performed by: INTERNAL MEDICINE

## 2018-01-08 PROCEDURE — 99999 PR PBB SHADOW E&M-EST. PATIENT-LVL III: CPT | Mod: PBBFAC,,, | Performed by: INTERNAL MEDICINE

## 2018-01-08 RX ORDER — AMIODARONE HYDROCHLORIDE 200 MG/1
200 TABLET ORAL DAILY
Qty: 90 TABLET | Refills: 3 | Status: SHIPPED | OUTPATIENT
Start: 2018-01-08 | End: 2018-04-05 | Stop reason: SDUPTHER

## 2018-01-08 RX ORDER — LISINOPRIL 10 MG/1
TABLET ORAL
Qty: 90 TABLET | Refills: 3 | Status: SHIPPED | OUTPATIENT
Start: 2018-01-08 | End: 2018-04-05 | Stop reason: SDUPTHER

## 2018-01-08 RX ORDER — METOPROLOL TARTRATE 50 MG/1
50 TABLET ORAL DAILY
COMMUNITY
Start: 2017-12-07 | End: 2018-07-25
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
## 2018-02-07 ENCOUNTER — LAB VISIT (OUTPATIENT)
Dept: LAB | Facility: HOSPITAL | Age: 83
End: 2018-02-07
Attending: UROLOGY
Payer: MEDICARE

## 2018-02-07 DIAGNOSIS — C61 PROSTATE CANCER: ICD-10-CM

## 2018-02-07 LAB — COMPLEXED PSA SERPL-MCNC: 0.2 NG/ML

## 2018-02-07 PROCEDURE — 84153 ASSAY OF PSA TOTAL: CPT

## 2018-02-07 PROCEDURE — 36415 COLL VENOUS BLD VENIPUNCTURE: CPT

## 2018-02-14 ENCOUNTER — OFFICE VISIT (OUTPATIENT)
Dept: UROLOGY | Facility: CLINIC | Age: 83
End: 2018-02-14
Payer: MEDICARE

## 2018-02-14 VITALS
BODY MASS INDEX: 26.36 KG/M2 | HEIGHT: 69 IN | HEART RATE: 57 BPM | TEMPERATURE: 98 F | SYSTOLIC BLOOD PRESSURE: 145 MMHG | WEIGHT: 178 LBS | DIASTOLIC BLOOD PRESSURE: 61 MMHG

## 2018-02-14 DIAGNOSIS — N40.1 BENIGN NON-NODULAR PROSTATIC HYPERPLASIA WITH LOWER URINARY TRACT SYMPTOMS: ICD-10-CM

## 2018-02-14 DIAGNOSIS — C61 PROSTATE CANCER: Primary | ICD-10-CM

## 2018-02-14 PROCEDURE — 1159F MED LIST DOCD IN RCRD: CPT | Mod: S$GLB,,, | Performed by: UROLOGY

## 2018-02-14 PROCEDURE — 99213 OFFICE O/P EST LOW 20 MIN: CPT | Mod: S$GLB,,, | Performed by: UROLOGY

## 2018-02-14 PROCEDURE — 1126F AMNT PAIN NOTED NONE PRSNT: CPT | Mod: S$GLB,,, | Performed by: UROLOGY

## 2018-02-14 PROCEDURE — 3008F BODY MASS INDEX DOCD: CPT | Mod: S$GLB,,, | Performed by: UROLOGY

## 2018-02-14 PROCEDURE — 99999 PR PBB SHADOW E&M-EST. PATIENT-LVL III: CPT | Mod: PBBFAC,,, | Performed by: UROLOGY

## 2018-02-14 NOTE — PROGRESS NOTES
Subjective:       Patient ID: Aleksandr Schultz is a 89 y.o. male.    Chief Complaint: prostate cancer    Benign Prostatic Hypertrophy        Mr. Schultz is an 89 yo male with a h/o [place] 3+3 prostate cancer.  He underwent XRT in 2005.  He is here for f/u.  He is continent.  ED is not bothersome.    PSA on 2/7/18 was 0.2.    He feels well and is without complaints today.     Lab Results   Component Value Date    PSA 0.65 12/04/2012    PSA 0.59 11/22/2011    PSA 0.63 12/17/2010    PSA 0.82 11/30/2009    PSA 0.86 12/11/2008    PSA 0.9 07/01/2008    PSA 0.9 12/10/2007    PSA 1.7 05/03/2007    PSA 0.8 04/18/2007    PSA 1.4 11/13/2006    PSA 1.7 05/22/2006    PSA 1.8 11/15/2005    PSA 1.8 07/19/2005    PSA 2.1 04/15/2005    PSA 7.3 (H) 11/12/2004    PSADIAG 0.20 02/07/2018    PSADIAG 0.38 02/03/2017    PSADIAG 0.58 01/08/2016    PSADIAG 0.55 07/07/2015    PSADIAG 1.5 01/06/2015    PSADIAG 0.76 01/07/2014     Has a h/o HTN, MI, and CVA.  Stroke was 1991 and MI 1982.  He is s/p Carotid endarterectomy.  He is s/p PCI for his CAD.  Takes 81 mg aspirin.  No chest pain. Has never taken NTG for chest pain.       Review of Systems    All other systems reviewed and negative except pertinent positives noted in HPI.    Objective:      Physical Exam   Constitutional: He is oriented to person, place, and time. He appears well-developed and well-nourished. No distress.   HENT:   Head: Normocephalic and atraumatic.   Eyes: No scleral icterus.   Neck: No tracheal deviation present.   Pulmonary/Chest: Effort normal. No respiratory distress.   Neurological: He is alert and oriented to person, place, and time.   Psychiatric: He has a normal mood and affect. His behavior is normal. Judgment and thought content normal.       Assessment:       1. Prostate cancer        Plan:       -psa reviewed= lower than before.  Now 13 years out from XRT.  Given his age and clinical course, he can f/u prn.     -I spent 15 minutes with the patient of which  more than half was spent in direct consultation with the patient in regards to our treatment and plan.

## 2018-03-29 NOTE — PROGRESS NOTES
Assessment /Plan     For exam results, see Encounter Report.    Nuclear sclerosis of left eye    Status post cataract extraction and insertion of intraocular lens of right eye    Bilateral dry eyes - Both Eyes    No diabetic retinopathy in both eyes - Both Eyes        Patient with son in law   --> daughter  suddenly 2017 MI playing cards  --> other daughter at [facility] double hit lymphoma --> BM stem cell Rx    Wife 89 yo  Bedridden x 5 years with AD  Caretaker      NSC OS - CE PRN as discussed  + FLOMAX use with fair - poor dilation  Patient happy with Va QOL    PC IOL OD  stable    NIDDM  no BDR or csme  Control    Dry Eye Syndrome: discussed use of warm compresses, preserved & non-preserved artificial tears, gel and PM ointment options.  Also discussed options utilizing medications.         Plan  RTC 1 year for cataract and BDR check, IOP & DFE  RTC Sooner prn with good understanding

## 2018-04-05 DIAGNOSIS — E78.2 MIXED HYPERLIPIDEMIA: ICD-10-CM

## 2018-04-05 DIAGNOSIS — I15.2 HYPERTENSION ASSOCIATED WITH DIABETES: ICD-10-CM

## 2018-04-05 DIAGNOSIS — E11.59 HYPERTENSION ASSOCIATED WITH DIABETES: ICD-10-CM

## 2018-04-05 DIAGNOSIS — I35.1 NONRHEUMATIC AORTIC VALVE INSUFFICIENCY: ICD-10-CM

## 2018-04-06 RX ORDER — AMIODARONE HYDROCHLORIDE 200 MG/1
TABLET ORAL
Qty: 90 TABLET | Refills: 3 | Status: SHIPPED | OUTPATIENT
Start: 2018-04-06 | End: 2019-09-03 | Stop reason: SDUPTHER

## 2018-04-06 RX ORDER — LISINOPRIL 10 MG/1
TABLET ORAL
Qty: 90 TABLET | Refills: 3 | Status: SHIPPED | OUTPATIENT
Start: 2018-04-06 | End: 2018-08-17

## 2018-04-11 ENCOUNTER — OFFICE VISIT (OUTPATIENT)
Dept: OPHTHALMOLOGY | Facility: CLINIC | Age: 83
End: 2018-04-11
Payer: MEDICARE

## 2018-04-11 DIAGNOSIS — H25.12 NUCLEAR SCLEROSIS OF LEFT EYE: Primary | ICD-10-CM

## 2018-04-11 DIAGNOSIS — H04.123 BILATERAL DRY EYES: ICD-10-CM

## 2018-04-11 DIAGNOSIS — Z96.1 STATUS POST CATARACT EXTRACTION AND INSERTION OF INTRAOCULAR LENS OF RIGHT EYE: ICD-10-CM

## 2018-04-11 DIAGNOSIS — Z98.41 STATUS POST CATARACT EXTRACTION AND INSERTION OF INTRAOCULAR LENS OF RIGHT EYE: ICD-10-CM

## 2018-04-11 PROCEDURE — 99999 PR PBB SHADOW E&M-EST. PATIENT-LVL II: CPT | Mod: PBBFAC,,, | Performed by: OPHTHALMOLOGY

## 2018-04-11 PROCEDURE — 92014 COMPRE OPH EXAM EST PT 1/>: CPT | Mod: S$GLB,,, | Performed by: OPHTHALMOLOGY

## 2018-07-18 ENCOUNTER — TELEPHONE (OUTPATIENT)
Dept: CARDIOLOGY | Facility: CLINIC | Age: 83
End: 2018-07-18

## 2018-07-18 ENCOUNTER — LAB VISIT (OUTPATIENT)
Dept: LAB | Facility: HOSPITAL | Age: 83
End: 2018-07-18
Attending: INTERNAL MEDICINE
Payer: MEDICARE

## 2018-07-18 DIAGNOSIS — I35.1 NONRHEUMATIC AORTIC VALVE INSUFFICIENCY: ICD-10-CM

## 2018-07-18 DIAGNOSIS — I15.2 HYPERTENSION ASSOCIATED WITH DIABETES: ICD-10-CM

## 2018-07-18 DIAGNOSIS — E78.2 MIXED HYPERLIPIDEMIA: ICD-10-CM

## 2018-07-18 DIAGNOSIS — E11.59 HYPERTENSION ASSOCIATED WITH DIABETES: ICD-10-CM

## 2018-07-18 LAB
ANION GAP SERPL CALC-SCNC: 9 MMOL/L
BUN SERPL-MCNC: 23 MG/DL
CALCIUM SERPL-MCNC: 9.6 MG/DL
CHLORIDE SERPL-SCNC: 102 MMOL/L
CHOLEST SERPL-MCNC: 145 MG/DL
CHOLEST/HDLC SERPL: 3.4 {RATIO}
CO2 SERPL-SCNC: 31 MMOL/L
CREAT SERPL-MCNC: 1.8 MG/DL
EST. GFR  (AFRICAN AMERICAN): 37 ML/MIN/1.73 M^2
EST. GFR  (NON AFRICAN AMERICAN): 32 ML/MIN/1.73 M^2
ESTIMATED AVG GLUCOSE: 137 MG/DL
GLUCOSE SERPL-MCNC: 76 MG/DL
HBA1C MFR BLD HPLC: 6.4 %
HDLC SERPL-MCNC: 43 MG/DL
HDLC SERPL: 29.7 %
LDLC SERPL CALC-MCNC: 85 MG/DL
NONHDLC SERPL-MCNC: 102 MG/DL
POTASSIUM SERPL-SCNC: 4.8 MMOL/L
SODIUM SERPL-SCNC: 142 MMOL/L
T4 FREE SERPL-MCNC: 1.45 NG/DL
TRIGL SERPL-MCNC: 85 MG/DL
TSH SERPL DL<=0.005 MIU/L-ACNC: 4.07 UIU/ML

## 2018-07-18 PROCEDURE — 84439 ASSAY OF FREE THYROXINE: CPT

## 2018-07-18 PROCEDURE — 83036 HEMOGLOBIN GLYCOSYLATED A1C: CPT

## 2018-07-18 PROCEDURE — 36415 COLL VENOUS BLD VENIPUNCTURE: CPT

## 2018-07-18 PROCEDURE — 80061 LIPID PANEL: CPT

## 2018-07-18 PROCEDURE — 84443 ASSAY THYROID STIM HORMONE: CPT

## 2018-07-18 PROCEDURE — 80048 BASIC METABOLIC PNL TOTAL CA: CPT

## 2018-07-18 NOTE — TELEPHONE ENCOUNTER
----- Message from Lynne Buckner MD sent at 7/18/2018 11:29 AM CDT -----  Please review.  We will discuss the results during your upcoming visit with me. Except for stable CKD it looks good.

## 2018-07-25 ENCOUNTER — OFFICE VISIT (OUTPATIENT)
Dept: CARDIOLOGY | Facility: CLINIC | Age: 83
End: 2018-07-25
Payer: MEDICARE

## 2018-07-25 VITALS
DIASTOLIC BLOOD PRESSURE: 67 MMHG | HEART RATE: 51 BPM | SYSTOLIC BLOOD PRESSURE: 143 MMHG | WEIGHT: 172.19 LBS | HEIGHT: 69 IN | BODY MASS INDEX: 25.5 KG/M2

## 2018-07-25 DIAGNOSIS — I48.0 PAROXYSMAL ATRIAL FIBRILLATION: ICD-10-CM

## 2018-07-25 DIAGNOSIS — E11.59 HYPERTENSION ASSOCIATED WITH DIABETES: Primary | ICD-10-CM

## 2018-07-25 DIAGNOSIS — I77.9 BILATERAL CAROTID ARTERY DISEASE: Chronic | ICD-10-CM

## 2018-07-25 DIAGNOSIS — I15.2 HYPERTENSION ASSOCIATED WITH DIABETES: Primary | ICD-10-CM

## 2018-07-25 DIAGNOSIS — I35.1 NONRHEUMATIC AORTIC VALVE INSUFFICIENCY: ICD-10-CM

## 2018-07-25 DIAGNOSIS — G47.33 OSA ON CPAP: ICD-10-CM

## 2018-07-25 DIAGNOSIS — E11.40 TYPE 2 DIABETES, CONTROLLED, WITH NEUROPATHY: ICD-10-CM

## 2018-07-25 DIAGNOSIS — E78.2 MIXED HYPERLIPIDEMIA: ICD-10-CM

## 2018-07-25 DIAGNOSIS — I35.0 NONRHEUMATIC AORTIC VALVE STENOSIS: ICD-10-CM

## 2018-07-25 DIAGNOSIS — I49.5 SINUS BRADY-TACHY SYNDROME: ICD-10-CM

## 2018-07-25 DIAGNOSIS — I71.40 ABDOMINAL AORTIC ANEURYSM (AAA) WITHOUT RUPTURE: Chronic | ICD-10-CM

## 2018-07-25 DIAGNOSIS — I67.2 CEREBRAL ATHEROSCLEROSIS: ICD-10-CM

## 2018-07-25 DIAGNOSIS — I73.9 PAD (PERIPHERAL ARTERY DISEASE): ICD-10-CM

## 2018-07-25 PROCEDURE — 99999 PR PBB SHADOW E&M-EST. PATIENT-LVL III: CPT | Mod: PBBFAC,,, | Performed by: INTERNAL MEDICINE

## 2018-07-25 PROCEDURE — 99214 OFFICE O/P EST MOD 30 MIN: CPT | Mod: S$GLB,,, | Performed by: INTERNAL MEDICINE

## 2018-07-25 RX ORDER — PENICILLIN V POTASSIUM 500 MG/1
500 TABLET, FILM COATED ORAL 4 TIMES DAILY
COMMUNITY
Start: 2018-07-24 | End: 2019-02-25

## 2018-07-25 RX ORDER — METOPROLOL TARTRATE 50 MG/1
50 TABLET ORAL 2 TIMES DAILY
COMMUNITY
End: 2022-04-13

## 2018-07-25 RX ORDER — FISH OIL/DHA/EPA 1200-144MG
1 CAPSULE ORAL DAILY
COMMUNITY
End: 2019-02-25

## 2018-07-25 NOTE — PROGRESS NOTES
"Subjective:   Patient ID:  Aleksandr Schultz is a 89 y.o. male who presents for follow-up of Hypertension      HPI: Recent history: taking medications as instructed, no medication side effects noted, no TIA's, no chest pain on exertion, no dyspnea on exertion and no swelling of ankles. Patient's symptoms have been unchanged. No medication side effects.  Lipids are at goal.  CKD stable.      Lab Results   Component Value Date     07/18/2018    K 4.8 07/18/2018     07/18/2018    CO2 31 (H) 07/18/2018    BUN 23 07/18/2018    CREATININE 1.8 (H) 07/18/2018    GLU 76 07/18/2018    HGBA1C 6.4 (H) 07/18/2018    MG 2.3 05/29/2017    AST 27 01/03/2018    ALT 47 (H) 01/03/2018    ALBUMIN 3.7 01/03/2018    PROT 7.2 01/03/2018    BILITOT 0.6 01/03/2018    WBC 7.65 05/29/2017    HGB 13.4 (L) 05/29/2017    HCT 39.8 (L) 05/29/2017    MCV 92 05/29/2017     05/29/2017    INR 1.0 05/02/2017    PSA 0.65 12/04/2012    TSH 4.069 (H) 07/18/2018    CHOL 145 07/18/2018    HDL 43 07/18/2018    LDLCALC 85.0 07/18/2018    TRIG 85 07/18/2018       Review of Systems   HENT: Negative.    Eyes: Negative.    Cardiovascular: Positive for dyspnea on exertion. Negative for chest pain, claudication, irregular heartbeat, near-syncope and syncope.   Respiratory: Positive for cough. Negative for shortness of breath and snoring.    Endocrine: Negative.  Negative for cold intolerance, heat intolerance, polydipsia, polyphagia and polyuria.   Skin: Negative.    Musculoskeletal: Positive for arthritis, back pain, muscle cramps and muscle weakness.   Gastrointestinal: Negative.    Genitourinary: Negative.    Psychiatric/Behavioral: Negative.        Objective:   Physical Exam   Constitutional: He is oriented to person, place, and time. He appears well-developed and well-nourished.   BP (!) 143/67   Pulse (!) 51   Ht 5' 9" (1.753 m)   Wt 78.1 kg (172 lb 2.9 oz)   BMI 25.43 kg/m²      HENT:   Head: Normocephalic.   Eyes: Pupils are equal, " round, and reactive to light.   Neck: Normal range of motion. Neck supple. Carotid bruit is not present. No thyromegaly present.   Cardiovascular: Normal rate, regular rhythm, normal heart sounds and intact distal pulses.  Exam reveals no gallop and no friction rub.    No murmur heard.  Pulses:       Carotid pulses are 2+ on the right side, and 2+ on the left side.       Radial pulses are 2+ on the right side, and 2+ on the left side.        Femoral pulses are 2+ on the right side, and 2+ on the left side.       Popliteal pulses are 2+ on the right side, and 2+ on the left side.        Dorsalis pedis pulses are 2+ on the right side, and 2+ on the left side.        Posterior tibial pulses are 2+ on the right side, and 2+ on the left side.   Pulmonary/Chest: Effort normal and breath sounds normal. No respiratory distress. He has no wheezes. He has no rales. He exhibits no tenderness.   Abdominal: Soft. Bowel sounds are normal.   Musculoskeletal: Normal range of motion. He exhibits no edema.   Neurological: He is alert and oriented to person, place, and time.   Skin: Skin is warm and dry.   Psychiatric: He has a normal mood and affect.   Nursing note and vitals reviewed.        Assessment and Plan:     Problem List Items Addressed This Visit        Cardiology Problems    AAA (abdominal aortic aneurysm) (Chronic)    Relevant Orders    Hemoglobin A1c    TSH    Comprehensive metabolic panel    Lipid panel    Bilateral carotid artery disease (Chronic)    Relevant Orders    CAR Ultrasound doppler carotid bilateral    Hemoglobin A1c    TSH    Comprehensive metabolic panel    Lipid panel    PAD (peripheral artery disease)    Relevant Orders    Hemoglobin A1c    TSH    Comprehensive metabolic panel    Lipid panel    Nonrheumatic aortic valve stenosis    Relevant Orders    Hemoglobin A1c    TSH    Comprehensive metabolic panel    Lipid panel    Hyperlipidemia    Relevant Orders    Hemoglobin A1c    TSH    Comprehensive  metabolic panel    Lipid panel    Paroxysmal atrial fibrillation    Relevant Orders    Hemoglobin A1c    TSH    Comprehensive metabolic panel    Lipid panel    Sinus noemy-tachy syndrome    Relevant Orders    Hemoglobin A1c    TSH    Comprehensive metabolic panel    Lipid panel    Nonrheumatic aortic valve insufficiency, mild to moderate    Relevant Orders    Hemoglobin A1c    TSH    Comprehensive metabolic panel    Lipid panel    Hypertension associated with diabetes - Primary    Relevant Orders    Hemoglobin A1c    TSH    Comprehensive metabolic panel    Lipid panel       Other    Type 2 diabetes, controlled, with neuropathy    Relevant Orders    Hemoglobin A1c    TSH    Comprehensive metabolic panel    Lipid panel    MICHELLE on CPAP    Relevant Orders    Hemoglobin A1c    TSH    Comprehensive metabolic panel    Lipid panel      Other Visit Diagnoses     Cerebral atherosclerosis         Relevant Orders    CAR Ultrasound doppler carotid bilateral    Hemoglobin A1c    TSH    Comprehensive metabolic panel    Lipid panel          Patient's Medications   New Prescriptions    No medications on file   Previous Medications    ALBUTEROL (PROVENTIL) 2.5 MG /3 ML (0.083 %) NEBULIZER SOLUTION    Take 2.5 mg by nebulization 4 (four) times daily as needed.     AMIODARONE (PACERONE) 200 MG TAB    TAKE 1 TABLET ONE TIME DAILY. TAKE AT NIGHT PER DR. LIPSCOMB ON 6/23/17.    AMLODIPINE (NORVASC) 10 MG TABLET    TAKE 1 TABLET ONE TIME DAILY    APIXABAN 2.5 MG TAB    Take 1 tablet (2.5 mg total) by mouth 2 (two) times daily.    ASPIRIN (ECOTRIN) 81 MG EC TABLET    Take 81 mg by mouth once daily.      ATORVASTATIN (LIPITOR) 40 MG TABLET    Take 40 mg by mouth once daily.    CINNAMON BARK (CINNAMON) 500 MG CAPSULE    Take 500 mg by mouth once daily.    CYCLOBENZAPRINE (FLEXERIL) 10 MG TABLET    Take 10 mg by mouth 3 (three) times daily as needed. Pt taking one pill at night.    FISH OIL-DHA-EPA 1,200-144-216 MG CAP    Take 1 capsule by  mouth once daily.    FOLIC ACID (FOLVITE) 1 MG TABLET    Take 1 mg by mouth once daily.    FUROSEMIDE (LASIX) 40 MG TABLET    TAKE 1 TABLET EVERY DAY    GARLIC 1,000 MG CAP    Take 1 tablet by mouth 2 (two) times daily.     GLIMEPIRIDE (AMARYL) 4 MG TABLET    Take 4 mg by mouth 2 (two) times daily.     IPRATROPIUM (ATROVENT) 0.02 % NEBULIZER SOLUTION    Take 500 mcg by nebulization 4 (four) times daily. PRN    LISINOPRIL 10 MG TABLET    TAKE 1 TABLET EVERY MORNING    LORATADINE 10 MG CAP    Take 1 tablet by mouth once daily.     METFORMIN (GLUCOPHAGE) 500 MG TABLET    Take 500 mg by mouth daily with breakfast.    METOPROLOL TARTRATE (LOPRESSOR) 50 MG TABLET    Take 50 mg by mouth 2 (two) times daily.    MOMETASONE (NASONEX) 50 MCG/ACTUATION NASAL SPRAY    2 sprays by Nasal route daily as needed.     NITROGLYCERIN (NITROSTAT) 0.4 MG SL TABLET    Place 0.4 mg under the tongue every 5 (five) minutes as needed (PRN).     PENICILLIN V POTASSIUM (VEETID) 500 MG TABLET    Take 500 mg by mouth 4 (four) times daily.    POTASSIUM CHLORIDE (MICRO-K) 10 MEQ CPSR    TAKE 1 CAPSULE ONE TIME DAILY    RANITIDINE (ZANTAC) 150 MG TABLET    Take 150 mg by mouth 2 (two) times daily. Pt taking prn.    TAMSULOSIN (FLOMAX) 0.4 MG CP24    Take 0.8 mg by mouth every evening.     UNABLE TO FIND    Take by mouth once daily. Advanced Colon Care Pt taking one pill daily.    VITAMIN D 1000 UNITS TAB    Take 1,000 Units by mouth once daily.   Modified Medications    No medications on file   Discontinued Medications    METOPROLOL TARTRATE (LOPRESSOR) 50 MG TABLET    Take 50 mg by mouth once daily. Pt has not started taking as of 1/8/18.    PINDOLOL (VISKEN) 10 MG TABLET    Take 1 tablet (10 mg total) by mouth 2 (two) times daily.     May d/c Fish Oils.  Continue on the present regimen.    Follow-up in about 6 months (around 1/25/2019).

## 2018-08-09 ENCOUNTER — TELEPHONE (OUTPATIENT)
Dept: CARDIOLOGY | Facility: CLINIC | Age: 83
End: 2018-08-09

## 2018-08-09 ENCOUNTER — CLINICAL SUPPORT (OUTPATIENT)
Dept: CARDIOLOGY | Facility: CLINIC | Age: 83
End: 2018-08-09
Attending: INTERNAL MEDICINE
Payer: MEDICARE

## 2018-08-09 DIAGNOSIS — I65.21 CAROTID ARTERY STENOSIS, ASYMPTOMATIC, RIGHT: Primary | ICD-10-CM

## 2018-08-09 DIAGNOSIS — I67.2 CEREBRAL ATHEROSCLEROSIS: ICD-10-CM

## 2018-08-09 DIAGNOSIS — I77.9 BILATERAL CAROTID ARTERY DISEASE: Chronic | ICD-10-CM

## 2018-08-09 LAB — INTERNAL CAROTID STENOSIS: ABNORMAL

## 2018-08-09 PROCEDURE — 93880 EXTRACRANIAL BILAT STUDY: CPT | Mod: S$GLB,,, | Performed by: INTERNAL MEDICINE

## 2018-08-09 NOTE — TELEPHONE ENCOUNTER
----- Message from Lynne Buckner MD sent at 8/9/2018  4:53 PM CDT -----  Mr. Schultz.  Please review results of your carotid duplex scan. It shows that blockage on the right side has progressed to the severe level and on the left side remains mild and stable.  I think you need to be seen by a vascular surgeon,. I will refer you to Dr. Ace to discuss your options.

## 2018-08-10 ENCOUNTER — TELEPHONE (OUTPATIENT)
Dept: VASCULAR SURGERY | Facility: CLINIC | Age: 83
End: 2018-08-10

## 2018-08-10 NOTE — TELEPHONE ENCOUNTER
Contacted patient's daughter José Miguel to schedule appt with Dr. Ace from referral. Appt scheduled, pt's daughter verified. Appt letter placed in mail. ----- Message from MINE Ace III, MD sent at 8/10/2018  9:13 AM CDT -----  Thanks for the referral    Zuly please set this pt up for a clinic visit.  No further studies needed  ----- Message -----  From: Lynne Buckner MD  Sent: 8/9/2018   4:53 PM  To: MINE Ace III, MD, #    Mr. Marion.  Please review results of your carotid duplex scan. It shows that blockage on the right side has progressed to the severe level and on the left side remains mild and stable.  I think you need to be seen by a vascular surgeon,. I will refer you to Dr. Ace to discuss your options.

## 2018-08-14 ENCOUNTER — OFFICE VISIT (OUTPATIENT)
Dept: VASCULAR SURGERY | Facility: CLINIC | Age: 83
End: 2018-08-14
Payer: MEDICARE

## 2018-08-14 VITALS
SYSTOLIC BLOOD PRESSURE: 155 MMHG | BODY MASS INDEX: 25.73 KG/M2 | WEIGHT: 169.75 LBS | HEART RATE: 52 BPM | DIASTOLIC BLOOD PRESSURE: 65 MMHG | HEIGHT: 68 IN | TEMPERATURE: 98 F

## 2018-08-14 DIAGNOSIS — I77.9 BILATERAL CAROTID ARTERY DISEASE: Primary | Chronic | ICD-10-CM

## 2018-08-14 PROCEDURE — 99205 OFFICE O/P NEW HI 60 MIN: CPT | Mod: S$GLB,,, | Performed by: SURGERY

## 2018-08-14 PROCEDURE — 99999 PR PBB SHADOW E&M-EST. PATIENT-LVL V: CPT | Mod: PBBFAC,,, | Performed by: SURGERY

## 2018-08-14 RX ORDER — PIOGLITAZONEHYDROCHLORIDE 15 MG/1
15 TABLET ORAL DAILY
COMMUNITY

## 2018-08-14 NOTE — Clinical Note
August 14, 2018      Lynne Buckner MD  [address] [facility]  8th [address]  [place] [address]           [facility] - Vascular Surgery  [address]  [place] [address]  Phone: [phone]  Fax: [phone]          Patient: Aleksandr Schultz   MR Number: [id number]   YOB: 1926   Date of Visit: 8/14/2018       Dear Dr. Lynne Buckner:    Thank you for referring Aleksandr Schultz to me for evaluation. Attached you will find relevant portions of my assessment and plan of care.    If you have questions, please do not hesitate to call me. I look forward to following Aleksandr Schultz along with you.    Sincerely,    MINE Ace III, MD    Enclosure  CC:  No Recipients    If you would like to receive this communication electronically, please contact [email or web address] or [phone] to request more information on [facility] access.    For providers and/or their staff who would like to refer a patient to [facility], please contact us through our one-stop-shop provider referral line, [facility], at [phone].    If you feel you have received this communication in error or would no longer like to receive these types of communications, please e-mail [email or web address]

## 2018-08-14 NOTE — PROGRESS NOTES
REFERRING PHYSICIAN:  Lynne Buckner M.D.    HISTORY OF PRESENT ILLNESS:  A 89-year-old male with multiple significant   medical comorbidities including AFib, on Eliquis, CKD, advanced stage III,   almost IV (GFR of 32), COPD, who presents for evaluation of a right greater than   80% carotid stenosis.  This is significantly progressive with no significant   stenosis on the right just a year ago.    He has never had a right hemispheric stroke, TIA or amaurosis.    However, he had a left hemispheric stroke in May 2017, which is presumed to have   been a cardioembolic event as he was started on Eliquis after this.  He has   some mild residual right hand weakness.    He is a limited ambulator walking around the home with a rolling walker.  He is   the sole caretaker for his wife at home who has advanced dementia and is in   hospice.    Importantly, he is also status post right carotid endarterectomy in 1990.    PAST MEDICAL HISTORY:  1.  COPD.  He gets dyspnea on exertion easily.  2.  Atrial fibrillation, on Pradaxa.  3.  Hypertension.  4.  Aortic valve stenosis, mild to moderate.    PAST SURGICAL HISTORY:  Back surgery, right carotid endarterectomy in 1990 as   above.    FAMILY HISTORY:  Positive for coronary artery disease.    SOCIAL HISTORY:  He is a former smoker, quit in 1997.    MEDICATIONS:  Include Eliquis, aspirin and a statin.  See EPIC for full list.    ALLERGIES:  IODINE.    REVIEW OF SYSTEMS:  CARDIOVASCULAR:  Denies postprandial pain or DVT.  All other systems including eyes, ENT, respiratory, musculoskeletal,   psychiatric, lymph, allergy and immune are negative.    PHYSICAL EXAMINATION:  VITAL SIGNS:  See nursing note.  GENERAL:  He appears every part of his 89 years of age.  He is in no acute   distress.  RESPIRATORY:  Normal effort.  Clear to auscultation.  CARDIOVASCULAR:  Regular rate and rhythm, nondisplaced PMI, no murmur.  VASCULAR:  2+ radial, brachial, femoral pulses.  ABDOMEN:  No masses or  tenderness.  No hepatosplenomegaly.  Aorta cannot be   palpated.  EYES:  Normal conjunctivae and lids.  ENT:  Good dentition.  NECK:  No JVD or thyromegaly.  MUSCULOSKELETAL:  No kyphosis or scoliosis.  EXTREMITIES:  Without clubbing or cyanosis.  SKIN:  Warm and dry.  NEUROLOGIC:  Alert and oriented x3.  Normal mood and affect.  Midline tongue.    No speech difficulty or hoarseness, 5/5 motor strength in all extremities.    IMAGING:  Recent carotid duplex reveals a greater than 80% right carotid   stenosis.  Peak systolic velocity of 490 and end-diastolic of 208 and ratio   greater than 16.  This is rapidly progressive from one year ago.  He has no   significant left carotid stenosis.    Creatinine of 1.8 and GFR of 32.    ASSESSMENT:  Right greater than 80% carotid stenosis, rapidly progressive,   neurologically asymptomatic, recurrent from endarterectomy in 1990.    He would be a very high surgical risk candidate for redo right carotid   endarterectomy, given his deconditioned state, advanced age, COPD, and the   recurrent nature of this stenosis.    Likewise, he would be a very high risk candidate for any consideration of   carotid stenting given his advanced age and his severe renal insufficiency.    Given that this is a neurologically asymptomatic stenosis, even though it is   rapidly progressive, I believe that the natural history of this is likely better   than his periprocedural risk for either carotid endarterectomy or carotid   stenting.    I had a long and detailed discussion with him and his only daughter.  They   understand this and are comfortable with the decision for continued medical   treatment.    RECOMMENDATIONS:  Continue current aggressive medical treatment.      NURA/JESSICA  dd: 08/14/2018 16:03:04 (CDT)  td: 08/15/2018 07:32:05 (CDT)  Doc ID   #[id number]  Job ID #[id number]    CC: Lynne Buckner MD

## 2018-08-17 ENCOUNTER — TELEPHONE (OUTPATIENT)
Dept: CARDIOLOGY | Facility: CLINIC | Age: 83
End: 2018-08-17

## 2018-08-17 NOTE — TELEPHONE ENCOUNTER
----- Message from Reina Bryson sent at 8/17/2018 11:53 AM CDT -----  Contact: PT;'S DAUGHTER  Pt was in the ER with swelling to face and lips possibly due to  lisinopril 10 MG tablet and wants to know if there is something he can take to replace this drug.  Pt's daughter José Miguel can be reached at [phone].    Thank you

## 2018-08-17 NOTE — TELEPHONE ENCOUNTER
José Miguel called stating that pt had facial swelling this am and went to ER in [place] and was told that he has angioedema from Lisinopril and to call and see if he should be on another medication. José Miguel stated that the swelling is better and was discharged. José Miguel stated that pt took Lisinopril today and was told to stop taking the medication. Please advise.

## 2018-08-19 ENCOUNTER — PATIENT MESSAGE (OUTPATIENT)
Dept: CARDIOLOGY | Facility: CLINIC | Age: 83
End: 2018-08-19

## 2018-08-20 NOTE — TELEPHONE ENCOUNTER
OK. Please monitor BP and let us know within a week the records. Hopefully by that time edema resolves and we will be able to add another medication

## 2018-08-20 NOTE — TELEPHONE ENCOUNTER
José Miguel notified, verbalized understanding. José Miguel stated that Dr. Ace advised them that he is not going to perform carotid surgery on pt and advised them that pt is in stage 4 kidney disease. Please advise.

## 2018-08-20 NOTE — TELEPHONE ENCOUNTER
José Miguel called stating that pt's systolic b/p has been in the 140's. José Miguel stated that pt continued to have facial/lip swelling over the weekend and the hospital called to check on the pt and she told them that pt was still having facial/lip edema and was told that it may be a residual effect from Lisinopril. Pt was also given penicillin in case he had an abscess that was causing the facial edema a week before this happened but the hospital said that this was due to Lisinopril. Pt stated that she spoke w/pt's morning sitter and she stated that pt does not have any facial edema today. José Miguel would like to know if pt should start on another medication for his b/p. Please advise.

## 2018-09-06 ENCOUNTER — TELEPHONE (OUTPATIENT)
Dept: CARDIOLOGY | Facility: CLINIC | Age: 83
End: 2018-09-06

## 2018-09-06 DIAGNOSIS — E11.59 HYPERTENSION ASSOCIATED WITH DIABETES: Primary | ICD-10-CM

## 2018-09-06 DIAGNOSIS — I15.2 HYPERTENSION ASSOCIATED WITH DIABETES: Primary | ICD-10-CM

## 2018-09-06 RX ORDER — HYDRALAZINE HYDROCHLORIDE 25 MG/1
25 TABLET, FILM COATED ORAL EVERY 8 HOURS
COMMUNITY
End: 2018-09-06 | Stop reason: SDUPTHER

## 2018-09-06 NOTE — TELEPHONE ENCOUNTER
----- Message from Lexie León MA sent at 9/6/2018  8:37 AM CDT -----  Contact: daughter called  Priscilla please call the patient daughter José Miguel she need  To talk  to you about his blood pressure and his medication. Please call [phone]. Thank you.

## 2018-09-06 NOTE — TELEPHONE ENCOUNTER
José Miguel called stating that pt's b/p has been in the 140's-160's. José Miguel stated that she tries to monitor pt's salt intake and that pt hasn't changed his diet. José Miguel stated that pt denies edema and dizziness, but has had 2 falls in the past week. José Miguel wants to know if pt should be on another medication since he had to stop Lisinopril due to angioedema. Please advise.

## 2018-09-07 RX ORDER — HYDRALAZINE HYDROCHLORIDE 25 MG/1
25 TABLET, FILM COATED ORAL EVERY 8 HOURS
Qty: 90 TABLET | Refills: 6 | Status: SHIPPED | OUTPATIENT
Start: 2018-09-07 | End: 2018-09-10 | Stop reason: SDUPTHER

## 2018-09-10 DIAGNOSIS — I15.2 HYPERTENSION ASSOCIATED WITH DIABETES: ICD-10-CM

## 2018-09-10 DIAGNOSIS — E11.59 HYPERTENSION ASSOCIATED WITH DIABETES: ICD-10-CM

## 2018-09-10 RX ORDER — HYDRALAZINE HYDROCHLORIDE 25 MG/1
25 TABLET, FILM COATED ORAL EVERY 8 HOURS
Qty: 90 TABLET | Refills: 6 | Status: SHIPPED | OUTPATIENT
Start: 2018-09-10 | End: 2018-11-06 | Stop reason: SDUPTHER

## 2018-11-06 DIAGNOSIS — I15.2 HYPERTENSION ASSOCIATED WITH DIABETES: ICD-10-CM

## 2018-11-06 DIAGNOSIS — E11.59 HYPERTENSION ASSOCIATED WITH DIABETES: ICD-10-CM

## 2018-11-06 RX ORDER — HYDRALAZINE HYDROCHLORIDE 25 MG/1
25 TABLET, FILM COATED ORAL EVERY 8 HOURS
Qty: 270 TABLET | Refills: 2 | Status: SHIPPED | OUTPATIENT
Start: 2018-11-06 | End: 2019-08-13 | Stop reason: SDUPTHER

## 2019-01-17 ENCOUNTER — TELEPHONE (OUTPATIENT)
Dept: CARDIOLOGY | Facility: CLINIC | Age: 84
End: 2019-01-17

## 2019-01-17 DIAGNOSIS — I35.1 NONRHEUMATIC AORTIC VALVE INSUFFICIENCY: ICD-10-CM

## 2019-01-17 DIAGNOSIS — E11.59 HYPERTENSION ASSOCIATED WITH DIABETES: ICD-10-CM

## 2019-01-17 DIAGNOSIS — E78.2 MIXED HYPERLIPIDEMIA: ICD-10-CM

## 2019-01-17 DIAGNOSIS — I15.2 HYPERTENSION ASSOCIATED WITH DIABETES: ICD-10-CM

## 2019-01-17 NOTE — TELEPHONE ENCOUNTER
----- Message from Margarita Angela sent at 1/17/2019 12:02 PM CST -----  Contact: Patient's daughter José Miguel  The Pt's daughter is calling to speak with you regarding [facility] on why he is taking apixaban 2.5 mg Tab and he will need a script to be sent with it so the patient will not have to pay any out of pocket expenses  for the medication. Please call her back @ [phone]. Thanks, Margarita

## 2019-01-17 NOTE — TELEPHONE ENCOUNTER
José Miguel notified, verbalized understanding. This information and pt's prescription was mailed to José Miguel on 1/17/19 per José Miguel's request.

## 2019-01-17 NOTE — TELEPHONE ENCOUNTER
José Miguel called stating that Eliquis is expensive and that pt can get the medication from [facility] cheaper than [employer]. José Miguel stated that she needs a printed prescription from the Eliquis and a letter stating why pt has to take this medication. Please advise.

## 2019-01-18 RX ORDER — APIXABAN 2.5 MG/1
TABLET, FILM COATED ORAL
Qty: 180 TABLET | Refills: 3 | Status: SHIPPED | OUTPATIENT
Start: 2019-01-18 | End: 2020-01-27

## 2019-01-19 DIAGNOSIS — E87.6 HYPOKALEMIA: ICD-10-CM

## 2019-01-21 RX ORDER — AMLODIPINE BESYLATE 10 MG/1
TABLET ORAL
Qty: 90 TABLET | Refills: 3 | Status: SHIPPED | OUTPATIENT
Start: 2019-01-21 | End: 2020-01-27

## 2019-01-21 RX ORDER — FUROSEMIDE 40 MG/1
TABLET ORAL
Qty: 90 TABLET | Refills: 3 | Status: ON HOLD | OUTPATIENT
Start: 2019-01-21 | End: 2019-03-02 | Stop reason: HOSPADM

## 2019-01-21 RX ORDER — POTASSIUM CHLORIDE 750 MG/1
CAPSULE, EXTENDED RELEASE ORAL
Qty: 90 CAPSULE | Refills: 3 | Status: ON HOLD | OUTPATIENT
Start: 2019-01-21 | End: 2019-03-02 | Stop reason: HOSPADM

## 2019-02-22 ENCOUNTER — TELEPHONE (OUTPATIENT)
Dept: CARDIOLOGY | Facility: CLINIC | Age: 84
End: 2019-02-22

## 2019-02-22 ENCOUNTER — LAB VISIT (OUTPATIENT)
Dept: LAB | Facility: HOSPITAL | Age: 84
End: 2019-02-22
Attending: INTERNAL MEDICINE
Payer: MEDICARE

## 2019-02-22 DIAGNOSIS — I73.9 PAD (PERIPHERAL ARTERY DISEASE): ICD-10-CM

## 2019-02-22 DIAGNOSIS — I48.0 PAROXYSMAL ATRIAL FIBRILLATION: ICD-10-CM

## 2019-02-22 DIAGNOSIS — I15.2 HYPERTENSION ASSOCIATED WITH DIABETES: ICD-10-CM

## 2019-02-22 DIAGNOSIS — G47.33 OSA ON CPAP: ICD-10-CM

## 2019-02-22 DIAGNOSIS — I77.9 BILATERAL CAROTID ARTERY DISEASE: Chronic | ICD-10-CM

## 2019-02-22 DIAGNOSIS — I35.1 NONRHEUMATIC AORTIC VALVE INSUFFICIENCY: ICD-10-CM

## 2019-02-22 DIAGNOSIS — I49.5 SINUS BRADY-TACHY SYNDROME: ICD-10-CM

## 2019-02-22 DIAGNOSIS — I71.40 ABDOMINAL AORTIC ANEURYSM (AAA) WITHOUT RUPTURE: Chronic | ICD-10-CM

## 2019-02-22 DIAGNOSIS — E11.59 HYPERTENSION ASSOCIATED WITH DIABETES: ICD-10-CM

## 2019-02-22 DIAGNOSIS — I67.2 CEREBRAL ATHEROSCLEROSIS: ICD-10-CM

## 2019-02-22 DIAGNOSIS — I35.0 NONRHEUMATIC AORTIC VALVE STENOSIS: ICD-10-CM

## 2019-02-22 DIAGNOSIS — E78.2 MIXED HYPERLIPIDEMIA: ICD-10-CM

## 2019-02-22 DIAGNOSIS — E11.40 TYPE 2 DIABETES, CONTROLLED, WITH NEUROPATHY: ICD-10-CM

## 2019-02-22 LAB
ALBUMIN SERPL BCP-MCNC: 3.4 G/DL
ALP SERPL-CCNC: 102 U/L
ALT SERPL W/O P-5'-P-CCNC: 29 U/L
ANION GAP SERPL CALC-SCNC: 9 MMOL/L
AST SERPL-CCNC: 25 U/L
BILIRUB SERPL-MCNC: 0.5 MG/DL
BUN SERPL-MCNC: 25 MG/DL
CALCIUM SERPL-MCNC: 9.2 MG/DL
CHLORIDE SERPL-SCNC: 105 MMOL/L
CHOLEST SERPL-MCNC: 146 MG/DL
CHOLEST/HDLC SERPL: 3 {RATIO}
CO2 SERPL-SCNC: 24 MMOL/L
CREAT SERPL-MCNC: 1.5 MG/DL
EST. GFR  (AFRICAN AMERICAN): 46 ML/MIN/1.73 M^2
EST. GFR  (NON AFRICAN AMERICAN): 40 ML/MIN/1.73 M^2
ESTIMATED AVG GLUCOSE: 148 MG/DL
GLUCOSE SERPL-MCNC: 82 MG/DL
HBA1C MFR BLD HPLC: 6.8 %
HDLC SERPL-MCNC: 49 MG/DL
HDLC SERPL: 33.6 %
LDLC SERPL CALC-MCNC: 80.2 MG/DL
NONHDLC SERPL-MCNC: 97 MG/DL
POTASSIUM SERPL-SCNC: 4.5 MMOL/L
PROT SERPL-MCNC: 6.8 G/DL
SODIUM SERPL-SCNC: 138 MMOL/L
T4 FREE SERPL-MCNC: 1.48 NG/DL
TRIGL SERPL-MCNC: 84 MG/DL
TSH SERPL DL<=0.005 MIU/L-ACNC: 4.36 UIU/ML

## 2019-02-22 PROCEDURE — 80061 LIPID PANEL: CPT

## 2019-02-22 PROCEDURE — 84439 ASSAY OF FREE THYROXINE: CPT

## 2019-02-22 PROCEDURE — 83036 HEMOGLOBIN GLYCOSYLATED A1C: CPT

## 2019-02-22 PROCEDURE — 80053 COMPREHEN METABOLIC PANEL: CPT

## 2019-02-22 PROCEDURE — 36415 COLL VENOUS BLD VENIPUNCTURE: CPT

## 2019-02-22 PROCEDURE — 84443 ASSAY THYROID STIM HORMONE: CPT

## 2019-02-22 NOTE — TELEPHONE ENCOUNTER
----- Message from Lynne Buckner MD sent at 2/22/2019 11:16 AM CST -----  Please review.  We will discuss the results during your upcoming visit with me. It looks good.  CKD is stable

## 2019-02-25 ENCOUNTER — OFFICE VISIT (OUTPATIENT)
Dept: CARDIOLOGY | Facility: CLINIC | Age: 84
End: 2019-02-25
Payer: MEDICARE

## 2019-02-25 VITALS
SYSTOLIC BLOOD PRESSURE: 161 MMHG | WEIGHT: 179.38 LBS | HEART RATE: 56 BPM | BODY MASS INDEX: 26.57 KG/M2 | HEIGHT: 69 IN | OXYGEN SATURATION: 94 % | DIASTOLIC BLOOD PRESSURE: 71 MMHG

## 2019-02-25 DIAGNOSIS — I35.1 NONRHEUMATIC AORTIC VALVE INSUFFICIENCY: ICD-10-CM

## 2019-02-25 DIAGNOSIS — G47.33 OSA ON CPAP: ICD-10-CM

## 2019-02-25 DIAGNOSIS — I63.9 CEREBROVASCULAR ACCIDENT (CVA), UNSPECIFIED MECHANISM: ICD-10-CM

## 2019-02-25 DIAGNOSIS — E78.2 MIXED HYPERLIPIDEMIA: ICD-10-CM

## 2019-02-25 DIAGNOSIS — I15.2 HYPERTENSION ASSOCIATED WITH DIABETES: Primary | ICD-10-CM

## 2019-02-25 DIAGNOSIS — I73.9 PAD (PERIPHERAL ARTERY DISEASE): ICD-10-CM

## 2019-02-25 DIAGNOSIS — I35.0 NONRHEUMATIC AORTIC VALVE STENOSIS: ICD-10-CM

## 2019-02-25 DIAGNOSIS — I65.23 BILATERAL CAROTID ARTERY STENOSIS: Chronic | ICD-10-CM

## 2019-02-25 DIAGNOSIS — N18.30 CKD (CHRONIC KIDNEY DISEASE) STAGE 3, GFR 30-59 ML/MIN: ICD-10-CM

## 2019-02-25 DIAGNOSIS — I71.40 ABDOMINAL AORTIC ANEURYSM (AAA) WITHOUT RUPTURE: Chronic | ICD-10-CM

## 2019-02-25 DIAGNOSIS — J43.8 OTHER EMPHYSEMA: Chronic | ICD-10-CM

## 2019-02-25 DIAGNOSIS — I48.0 PAROXYSMAL ATRIAL FIBRILLATION: ICD-10-CM

## 2019-02-25 DIAGNOSIS — E11.59 HYPERTENSION ASSOCIATED WITH DIABETES: Primary | ICD-10-CM

## 2019-02-25 DIAGNOSIS — I49.5 SINUS BRADY-TACHY SYNDROME: ICD-10-CM

## 2019-02-25 PROCEDURE — 1100F PR PT FALLS ASSESS DOC 2+ FALLS/FALL W/INJURY/YR: ICD-10-PCS | Mod: CPTII,S$GLB,, | Performed by: INTERNAL MEDICINE

## 2019-02-25 PROCEDURE — 3288F FALL RISK ASSESSMENT DOCD: CPT | Mod: CPTII,S$GLB,, | Performed by: INTERNAL MEDICINE

## 2019-02-25 PROCEDURE — 1100F PTFALLS ASSESS-DOCD GE2>/YR: CPT | Mod: CPTII,S$GLB,, | Performed by: INTERNAL MEDICINE

## 2019-02-25 PROCEDURE — 99214 OFFICE O/P EST MOD 30 MIN: CPT | Mod: S$GLB,,, | Performed by: INTERNAL MEDICINE

## 2019-02-25 PROCEDURE — 99214 PR OFFICE/OUTPT VISIT, EST, LEVL IV, 30-39 MIN: ICD-10-PCS | Mod: S$GLB,,, | Performed by: INTERNAL MEDICINE

## 2019-02-25 PROCEDURE — 99999 PR PBB SHADOW E&M-EST. PATIENT-LVL III: CPT | Mod: PBBFAC,,, | Performed by: INTERNAL MEDICINE

## 2019-02-25 PROCEDURE — 99999 PR PBB SHADOW E&M-EST. PATIENT-LVL III: ICD-10-PCS | Mod: PBBFAC,,, | Performed by: INTERNAL MEDICINE

## 2019-02-25 PROCEDURE — 3288F PR FALLS RISK ASSESSMENT DOCUMENTED: ICD-10-PCS | Mod: CPTII,S$GLB,, | Performed by: INTERNAL MEDICINE

## 2019-02-25 NOTE — PROGRESS NOTES
Subjective:   Patient ID:  Aleksandr Schultz is a 89 y.o. male who presents for follow-up of Hypertension      HPI: Patient attended birthday party on Saturday. He ate salty food. ON Sunday he felt weak and short of breath.His daughter took him to ER where he was checked, but except for O2 sats of 91-91 they found no abnormalities.  Medications were no changed.  He is feeling better today, but still  More short of breath than usual. Had salty food for lunch.    Lab Results   Component Value Date     02/22/2019    K 4.5 02/22/2019     02/22/2019    CO2 24 02/22/2019    BUN 25 02/22/2019    CREATININE 1.5 (H) 02/22/2019    GLU 82 02/22/2019    HGBA1C 6.8 (H) 02/22/2019    MG 2.3 05/29/2017    AST 25 02/22/2019    ALT 29 02/22/2019    ALBUMIN 3.4 (L) 02/22/2019    PROT 6.8 02/22/2019    BILITOT 0.5 02/22/2019    WBC 7.65 05/29/2017    HGB 13.4 (L) 05/29/2017    HCT 39.8 (L) 05/29/2017    MCV 92 05/29/2017     05/29/2017    INR 1.0 05/02/2017    PSA 0.65 12/04/2012    TSH 4.356 (H) 02/22/2019    CHOL 146 02/22/2019    HDL 49 02/22/2019    LDLCALC 80.2 02/22/2019    TRIG 84 02/22/2019       Review of Systems   Constitution: Positive for malaise/fatigue and weight gain.   HENT: Negative.    Eyes: Negative.    Cardiovascular: Positive for dyspnea on exertion, leg swelling and palpitations. Negative for chest pain, claudication, irregular heartbeat, near-syncope and syncope.   Respiratory: Positive for cough, sputum production and wheezing. Negative for shortness of breath and snoring.    Endocrine: Negative.  Negative for cold intolerance, heat intolerance, polydipsia, polyphagia and polyuria.   Skin: Negative.    Musculoskeletal: Positive for arthritis, back pain and muscle weakness.   Gastrointestinal: Negative.    Genitourinary: Negative.    Neurological: Positive for light-headedness, loss of balance, paresthesias and seizures.   Psychiatric/Behavioral: The patient is nervous/anxious.        Objective:  "  Physical Exam   Constitutional: He is oriented to person, place, and time. He appears well-developed and well-nourished.   BP (!) 161/71   Pulse (!) 56   Ht 5' 9" (1.753 m)   Wt 81.4 kg (179 lb 5.5 oz)   SpO2 (!) 94%   BMI 26.48 kg/m²      HENT:   Head: Normocephalic.   Eyes: Pupils are equal, round, and reactive to light.   Neck: Normal range of motion. Neck supple. Carotid bruit is not present. No thyromegaly present.   Cardiovascular: Normal rate, regular rhythm and intact distal pulses. Exam reveals no gallop and no friction rub.   Murmur heard.   Midsystolic murmur is present with a grade of 2/6 at the upper right sternal border.  Pulses:       Carotid pulses are 2+ on the right side, and 2+ on the left side.       Radial pulses are 2+ on the right side, and 2+ on the left side.        Femoral pulses are 2+ on the right side, and 2+ on the left side.       Popliteal pulses are 2+ on the right side, and 2+ on the left side.        Dorsalis pedis pulses are 2+ on the right side, and 2+ on the left side.        Posterior tibial pulses are 2+ on the right side, and 2+ on the left side.   Pulmonary/Chest: Effort normal and breath sounds normal. No respiratory distress. He has no wheezes. He has no rales. He exhibits no tenderness.   Abdominal: Soft. Bowel sounds are normal.   Musculoskeletal: Normal range of motion. He exhibits edema.   2 + bilaterally   Neurological: He is alert and oriented to person, place, and time.   Skin: Skin is warm and dry.   Psychiatric: He has a normal mood and affect.   Nursing note and vitals reviewed.        Assessment and Plan:     Problem List Items Addressed This Visit        Cardiology Problems    AAA (abdominal aortic aneurysm) (Chronic)    Bilateral carotid artery disease (Chronic)    PAD (peripheral artery disease)    Nonrheumatic aortic valve stenosis    Hyperlipidemia    Paroxysmal atrial fibrillation    Sinus noemy-tachy syndrome    Nonrheumatic aortic valve " insufficiency, mild to moderate    Cerebrovascular accident (CVA)    Hypertension associated with diabetes - Primary       Other    COPD (chronic obstructive pulmonary disease) (Chronic)    MICHELLE on CPAP    CKD (chronic kidney disease) stage 3, GFR 30-59 ml/min          Patient's Medications   New Prescriptions    No medications on file   Previous Medications    ALBUTEROL (PROVENTIL) 2.5 MG /3 ML (0.083 %) NEBULIZER SOLUTION    Take 2.5 mg by nebulization 4 (four) times daily as needed.     AMIODARONE (PACERONE) 200 MG TAB    TAKE 1 TABLET ONE TIME DAILY. TAKE AT NIGHT PER DR. LIPSCOMB ON 6/23/17.    AMLODIPINE (NORVASC) 10 MG TABLET    TAKE 1 TABLET EVERY DAY    ASPIRIN (ECOTRIN) 81 MG EC TABLET    Take 81 mg by mouth once daily.      ATORVASTATIN (LIPITOR) 40 MG TABLET    Take 40 mg by mouth once daily.    CYCLOBENZAPRINE (FLEXERIL) 10 MG TABLET    Take 10 mg by mouth 3 (three) times daily as needed. Pt taking one pill at night.    ELIQUIS 2.5 MG TAB    TAKE 1 TABLET TWICE DAILY    FUROSEMIDE (LASIX) 40 MG TABLET    TAKE 1 TABLET EVERY DAY    GLIMEPIRIDE (AMARYL) 4 MG TABLET    Take 4 mg by mouth 2 (two) times daily.     HYDRALAZINE (APRESOLINE) 25 MG TABLET    Take 1 tablet (25 mg total) by mouth every 8 (eight) hours.    IPRATROPIUM (ATROVENT) 0.02 % NEBULIZER SOLUTION    Take 500 mcg by nebulization 4 (four) times daily. PRN    LORATADINE 10 MG CAP    Take 1 tablet by mouth once daily.     METOPROLOL TARTRATE (LOPRESSOR) 50 MG TABLET    Take 50 mg by mouth 2 (two) times daily.    MOMETASONE (NASONEX) 50 MCG/ACTUATION NASAL SPRAY    2 sprays by Nasal route daily as needed.     NITROGLYCERIN (NITROSTAT) 0.4 MG SL TABLET    Place 0.4 mg under the tongue every 5 (five) minutes as needed (PRN).     PIOGLITAZONE (ACTOS) 15 MG TABLET    Take 15 mg by mouth once daily.    POTASSIUM CHLORIDE (MICRO-K) 10 MEQ CPSR    TAKE 1 CAPSULE EVERY DAY    RANITIDINE (ZANTAC) 150 MG TABLET    Take 150 mg by mouth 2 (two) times daily.  Pt taking prn.    TAMSULOSIN (FLOMAX) 0.4 MG CP24    Take 0.8 mg by mouth every evening.     VITAMIN D 1000 UNITS TAB    Take 1,000 Units by mouth once daily.   Modified Medications    No medications on file   Discontinued Medications    CINNAMON BARK (CINNAMON) 500 MG CAPSULE    Take 500 mg by mouth once daily.    FISH OIL-DHA-EPA 1,200-144-216 MG CAP    Take 1 capsule by mouth once daily.    FOLIC ACID (FOLVITE) 1 MG TABLET    Take 1 mg by mouth once daily.    GARLIC 1,000 MG CAP    Take 1 tablet by mouth 2 (two) times daily.     PENICILLIN V POTASSIUM (VEETID) 500 MG TABLET    Take 500 mg by mouth 4 (four) times daily.    UNABLE TO FIND    Take by mouth once daily. Advanced Colon Care Pt taking one pill daily.     Suspect dietary non-compliance and HFpEF.  Increase Lasix to BID until Wednesday.  Will call on Wednesday to reassess. If no better may need to be admitted.  Dietary compliance reinforced.    Follow-up in about 6 months (around 8/25/2019).

## 2019-02-26 ENCOUNTER — TELEPHONE (OUTPATIENT)
Dept: CARDIOLOGY | Facility: CLINIC | Age: 84
End: 2019-02-26

## 2019-02-26 ENCOUNTER — HOSPITAL ENCOUNTER (INPATIENT)
Facility: HOSPITAL | Age: 84
LOS: 4 days | Discharge: HOME HEALTH | DRG: 291 | End: 2019-03-02
Attending: EMERGENCY MEDICINE | Admitting: HOSPITALIST
Payer: MEDICARE

## 2019-02-26 DIAGNOSIS — J18.9 LEFT LOWER LOBE PNEUMONIA: ICD-10-CM

## 2019-02-26 DIAGNOSIS — R74.8 ELEVATED LIVER ENZYMES: ICD-10-CM

## 2019-02-26 DIAGNOSIS — I50.9 CHF EXACERBATION: ICD-10-CM

## 2019-02-26 DIAGNOSIS — E87.1 HYPONATREMIA: ICD-10-CM

## 2019-02-26 DIAGNOSIS — R07.9 CHEST PAIN: ICD-10-CM

## 2019-02-26 DIAGNOSIS — I50.9 ACUTE ON CHRONIC CONGESTIVE HEART FAILURE, UNSPECIFIED HEART FAILURE TYPE: Primary | ICD-10-CM

## 2019-02-26 LAB
ALBUMIN SERPL BCP-MCNC: 3.5 G/DL
ALP SERPL-CCNC: 123 U/L
ALT SERPL W/O P-5'-P-CCNC: 100 U/L
ANION GAP SERPL CALC-SCNC: 13 MMOL/L
AST SERPL-CCNC: 95 U/L
BASOPHILS # BLD AUTO: 0.01 K/UL
BASOPHILS NFR BLD: 0.1 %
BILIRUB SERPL-MCNC: 1.3 MG/DL
BNP SERPL-MCNC: 416 PG/ML
BUN SERPL-MCNC: 20 MG/DL
CALCIUM SERPL-MCNC: 8.5 MG/DL
CHLORIDE SERPL-SCNC: 92 MMOL/L
CO2 SERPL-SCNC: 23 MMOL/L
CREAT SERPL-MCNC: 1.3 MG/DL
DIFFERENTIAL METHOD: ABNORMAL
EOSINOPHIL # BLD AUTO: 0 K/UL
EOSINOPHIL NFR BLD: 0.2 %
ERYTHROCYTE [DISTWIDTH] IN BLOOD BY AUTOMATED COUNT: 14.1 %
EST. GFR  (AFRICAN AMERICAN): 55 ML/MIN/1.73 M^2
EST. GFR  (NON AFRICAN AMERICAN): 47 ML/MIN/1.73 M^2
GLUCOSE SERPL-MCNC: 97 MG/DL
HCT VFR BLD AUTO: 34.7 %
HGB BLD-MCNC: 11.7 G/DL
LYMPHOCYTES # BLD AUTO: 1.7 K/UL
LYMPHOCYTES NFR BLD: 13.4 %
MCH RBC QN AUTO: 31.5 PG
MCHC RBC AUTO-ENTMCNC: 33.7 G/DL
MCV RBC AUTO: 94 FL
MONOCYTES # BLD AUTO: 1.6 K/UL
MONOCYTES NFR BLD: 12.6 %
NEUTROPHILS # BLD AUTO: 9.4 K/UL
NEUTROPHILS NFR BLD: 73 %
PLATELET # BLD AUTO: 246 K/UL
PMV BLD AUTO: 9.5 FL
POTASSIUM SERPL-SCNC: 3.6 MMOL/L
PROT SERPL-MCNC: 7 G/DL
RBC # BLD AUTO: 3.71 M/UL
SODIUM SERPL-SCNC: 128 MMOL/L
TROPONIN I SERPL DL<=0.01 NG/ML-MCNC: <0.006 NG/ML
WBC # BLD AUTO: 12.8 K/UL

## 2019-02-26 PROCEDURE — 93010 EKG 12-LEAD: ICD-10-PCS | Mod: ,,, | Performed by: STUDENT IN AN ORGANIZED HEALTH CARE EDUCATION/TRAINING PROGRAM

## 2019-02-26 PROCEDURE — 96375 TX/PRO/DX INJ NEW DRUG ADDON: CPT

## 2019-02-26 PROCEDURE — 12000002 HC ACUTE/MED SURGE SEMI-PRIVATE ROOM

## 2019-02-26 PROCEDURE — 96376 TX/PRO/DX INJ SAME DRUG ADON: CPT

## 2019-02-26 PROCEDURE — 63600175 PHARM REV CODE 636 W HCPCS: Performed by: PHYSICIAN ASSISTANT

## 2019-02-26 PROCEDURE — 93005 ELECTROCARDIOGRAM TRACING: CPT

## 2019-02-26 PROCEDURE — 80053 COMPREHEN METABOLIC PANEL: CPT

## 2019-02-26 PROCEDURE — 93010 ELECTROCARDIOGRAM REPORT: CPT | Mod: ,,, | Performed by: STUDENT IN AN ORGANIZED HEALTH CARE EDUCATION/TRAINING PROGRAM

## 2019-02-26 PROCEDURE — 83880 ASSAY OF NATRIURETIC PEPTIDE: CPT

## 2019-02-26 PROCEDURE — 84484 ASSAY OF TROPONIN QUANT: CPT

## 2019-02-26 PROCEDURE — 96374 THER/PROPH/DIAG INJ IV PUSH: CPT

## 2019-02-26 PROCEDURE — 85025 COMPLETE CBC W/AUTO DIFF WBC: CPT

## 2019-02-26 PROCEDURE — 99285 EMERGENCY DEPT VISIT HI MDM: CPT | Mod: 25

## 2019-02-26 RX ORDER — IBUPROFEN 200 MG
16 TABLET ORAL
Status: DISCONTINUED | OUTPATIENT
Start: 2019-02-26 | End: 2019-03-02 | Stop reason: HOSPADM

## 2019-02-26 RX ORDER — INSULIN ASPART 100 [IU]/ML
0-5 INJECTION, SOLUTION INTRAVENOUS; SUBCUTANEOUS
Status: DISCONTINUED | OUTPATIENT
Start: 2019-02-26 | End: 2019-03-02 | Stop reason: HOSPADM

## 2019-02-26 RX ORDER — IPRATROPIUM BROMIDE AND ALBUTEROL SULFATE 2.5; .5 MG/3ML; MG/3ML
3 SOLUTION RESPIRATORY (INHALATION) EVERY 4 HOURS PRN
Status: DISCONTINUED | OUTPATIENT
Start: 2019-02-26 | End: 2019-03-02 | Stop reason: HOSPADM

## 2019-02-26 RX ORDER — FUROSEMIDE 10 MG/ML
80 INJECTION INTRAMUSCULAR; INTRAVENOUS
Status: COMPLETED | OUTPATIENT
Start: 2019-02-26 | End: 2019-02-26

## 2019-02-26 RX ORDER — ONDANSETRON 2 MG/ML
4 INJECTION INTRAMUSCULAR; INTRAVENOUS EVERY 8 HOURS PRN
Status: DISCONTINUED | OUTPATIENT
Start: 2019-02-26 | End: 2019-03-02 | Stop reason: HOSPADM

## 2019-02-26 RX ORDER — IBUPROFEN 200 MG
24 TABLET ORAL
Status: DISCONTINUED | OUTPATIENT
Start: 2019-02-26 | End: 2019-03-02 | Stop reason: HOSPADM

## 2019-02-26 RX ORDER — ENOXAPARIN SODIUM 100 MG/ML
40 INJECTION SUBCUTANEOUS EVERY 24 HOURS
Status: DISCONTINUED | OUTPATIENT
Start: 2019-02-26 | End: 2019-02-26

## 2019-02-26 RX ORDER — SODIUM CHLORIDE 0.9 % (FLUSH) 0.9 %
5 SYRINGE (ML) INJECTION
Status: DISCONTINUED | OUTPATIENT
Start: 2019-02-26 | End: 2019-03-02 | Stop reason: HOSPADM

## 2019-02-26 RX ORDER — ACETAMINOPHEN 325 MG/1
650 TABLET ORAL EVERY 4 HOURS PRN
Status: DISCONTINUED | OUTPATIENT
Start: 2019-02-26 | End: 2019-03-02 | Stop reason: HOSPADM

## 2019-02-26 RX ORDER — GLUCAGON 1 MG
1 KIT INJECTION
Status: DISCONTINUED | OUTPATIENT
Start: 2019-02-26 | End: 2019-03-02 | Stop reason: HOSPADM

## 2019-02-26 RX ADMIN — FUROSEMIDE 80 MG: 10 INJECTION, SOLUTION INTRAVENOUS at 08:02

## 2019-02-26 NOTE — TELEPHONE ENCOUNTER
José Miguel notified, verbalized understanding and stated that she is going to take pt to [facility].

## 2019-02-26 NOTE — TELEPHONE ENCOUNTER
Records reviewed. Patient was seen with shortness of breath and cough.  ECG, CXR and blood work -no acute abnormalities found. Sats were normal. Patient was discharged to out patient follow up after his symptoms improved

## 2019-02-26 NOTE — TELEPHONE ENCOUNTER
José Miguel called stating that pt is confused, has not had any urine output, facial edema, SpO2 91-95% and having shallow breathing that may be due to sleep apnea. José Miguel would like to know what if she should take pt to the local ER. I advised José Miguel if she feels pt needs immediate assistance she should call 911 or take pt to the ER. José Miguel verbalized understanding. Please advise.

## 2019-02-27 PROBLEM — I50.31 ACUTE DIASTOLIC CONGESTIVE HEART FAILURE: Status: ACTIVE | Noted: 2019-02-26

## 2019-02-27 PROBLEM — H10.33 ACUTE CONJUNCTIVITIS OF BOTH EYES: Status: ACTIVE | Noted: 2019-02-27

## 2019-02-27 PROBLEM — J69.0 ASPIRATION PNEUMONIA: Status: ACTIVE | Noted: 2019-02-27

## 2019-02-27 LAB
ANION GAP SERPL CALC-SCNC: 10 MMOL/L
AORTIC ROOT ANNULUS: 3.61 CM
AORTIC VALVE CUSP SEPERATION: 1.36 CM
AV INDEX (PROSTH): 0.53
AV MEAN GRADIENT: 11.27 MMHG
AV PEAK GRADIENT: 17.14 MMHG
AV VELOCITY RATIO: 0.67
BASOPHILS # BLD AUTO: 0.01 K/UL
BASOPHILS NFR BLD: 0.1 %
BILIRUB UR QL STRIP: NEGATIVE
BSA FOR ECHO PROCEDURE: 1.98 M2
BUN SERPL-MCNC: 19 MG/DL
CALCIUM SERPL-MCNC: 8.7 MG/DL
CHLORIDE SERPL-SCNC: 94 MMOL/L
CLARITY UR: CLEAR
CO2 SERPL-SCNC: 27 MMOL/L
COLOR UR: YELLOW
CREAT SERPL-MCNC: 1.4 MG/DL
CV ECHO LV RWT: 0.25 CM
DIFFERENTIAL METHOD: ABNORMAL
DOP CALC AO PEAK VEL: 2.07 M/S
DOP CALC AO VTI: 43.95 CM
DOP CALC LVOT PEAK VEL: 1.38 M/S
DOP CALCLVOT PEAK VEL VTI: 23.38 CM
E WAVE DECELERATION TIME: 264.97 MSEC
E/A RATIO: 0.83
ECHO LV POSTERIOR WALL: 0.62 CM (ref 0.6–1.1)
EOSINOPHIL # BLD AUTO: 0 K/UL
EOSINOPHIL NFR BLD: 0.1 %
ERYTHROCYTE [DISTWIDTH] IN BLOOD BY AUTOMATED COUNT: 14 %
EST. GFR  (AFRICAN AMERICAN): 50 ML/MIN/1.73 M^2
EST. GFR  (NON AFRICAN AMERICAN): 43 ML/MIN/1.73 M^2
FRACTIONAL SHORTENING: 43 % (ref 28–44)
GLUCOSE SERPL-MCNC: 139 MG/DL
GLUCOSE UR QL STRIP: NEGATIVE
HCT VFR BLD AUTO: 34.7 %
HGB BLD-MCNC: 11.6 G/DL
HGB UR QL STRIP: NEGATIVE
INTERVENTRICULAR SEPTUM: 0.77 CM (ref 0.6–1.1)
KETONES UR QL STRIP: NEGATIVE
LA MAJOR: 4.82 CM
LA MINOR: 5.29 CM
LA WIDTH: 2.9 CM
LACTATE SERPL-SCNC: 0.9 MMOL/L
LEFT ATRIUM SIZE: 3.84 CM
LEFT ATRIUM VOLUME INDEX: 24.4 ML/M2
LEFT ATRIUM VOLUME: 47.75 CM3
LEFT INTERNAL DIMENSION IN SYSTOLE: 2.76 CM (ref 2.1–4)
LEFT VENTRICLE DIASTOLIC VOLUME INDEX: 56.84 ML/M2
LEFT VENTRICLE DIASTOLIC VOLUME: 111.09 ML
LEFT VENTRICLE MASS INDEX: 55.6 G/M2
LEFT VENTRICLE SYSTOLIC VOLUME INDEX: 14.6 ML/M2
LEFT VENTRICLE SYSTOLIC VOLUME: 28.49 ML
LEFT VENTRICULAR INTERNAL DIMENSION IN DIASTOLE: 4.87 CM (ref 3.5–6)
LEFT VENTRICULAR MASS: 108.6 G
LEUKOCYTE ESTERASE UR QL STRIP: NEGATIVE
LV LATERAL E/E' RATIO: 8.85
LYMPHOCYTES # BLD AUTO: 0.6 K/UL
LYMPHOCYTES NFR BLD: 5.5 %
MCH RBC QN AUTO: 30.9 PG
MCHC RBC AUTO-ENTMCNC: 33.4 G/DL
MCV RBC AUTO: 93 FL
MONOCYTES # BLD AUTO: 1.1 K/UL
MONOCYTES NFR BLD: 10.9 %
MV PEAK A VEL: 1.39 M/S
MV PEAK E VEL: 1.15 M/S
NEUTROPHILS # BLD AUTO: 8.3 K/UL
NEUTROPHILS NFR BLD: 82.8 %
NITRITE UR QL STRIP: NEGATIVE
PH UR STRIP: 6 [PH] (ref 5–8)
PISA TR MAX VEL: 2.51 M/S
PLATELET # BLD AUTO: 242 K/UL
PMV BLD AUTO: 9.6 FL
POCT GLUCOSE: 135 MG/DL (ref 70–110)
POCT GLUCOSE: 160 MG/DL (ref 70–110)
POCT GLUCOSE: 180 MG/DL (ref 70–110)
POCT GLUCOSE: 50 MG/DL (ref 70–110)
POCT GLUCOSE: 52 MG/DL (ref 70–110)
POCT GLUCOSE: 83 MG/DL (ref 70–110)
POCT GLUCOSE: 88 MG/DL (ref 70–110)
POCT GLUCOSE: 88 MG/DL (ref 70–110)
POTASSIUM SERPL-SCNC: 3.1 MMOL/L
PROT UR QL STRIP: NEGATIVE
PV PEAK VELOCITY: 1.31 CM/S
RA MAJOR: 4.41 CM
RA PRESSURE: 8 MMHG
RBC # BLD AUTO: 3.75 M/UL
SODIUM SERPL-SCNC: 131 MMOL/L
SP GR UR STRIP: <=1.005 (ref 1–1.03)
TDI LATERAL: 0.13
TR MAX PG: 25.2 MMHG
TROPONIN I SERPL DL<=0.01 NG/ML-MCNC: 0.02 NG/ML
TV REST PULMONARY ARTERY PRESSURE: 33 MMHG
URN SPEC COLLECT METH UR: ABNORMAL
UROBILINOGEN UR STRIP-ACNC: NEGATIVE EU/DL
WBC # BLD AUTO: 9.99 K/UL

## 2019-02-27 PROCEDURE — 36415 COLL VENOUS BLD VENIPUNCTURE: CPT

## 2019-02-27 PROCEDURE — 80048 BASIC METABOLIC PNL TOTAL CA: CPT

## 2019-02-27 PROCEDURE — 63600175 PHARM REV CODE 636 W HCPCS: Performed by: FAMILY MEDICINE

## 2019-02-27 PROCEDURE — 99223 1ST HOSP IP/OBS HIGH 75: CPT | Mod: 25,,, | Performed by: STUDENT IN AN ORGANIZED HEALTH CARE EDUCATION/TRAINING PROGRAM

## 2019-02-27 PROCEDURE — 25000003 PHARM REV CODE 250: Performed by: NURSE PRACTITIONER

## 2019-02-27 PROCEDURE — 63600175 PHARM REV CODE 636 W HCPCS: Performed by: NURSE PRACTITIONER

## 2019-02-27 PROCEDURE — 99223 PR INITIAL HOSPITAL CARE,LEVL III: ICD-10-PCS | Mod: 25,,, | Performed by: STUDENT IN AN ORGANIZED HEALTH CARE EDUCATION/TRAINING PROGRAM

## 2019-02-27 PROCEDURE — 27000221 HC OXYGEN, UP TO 24 HOURS

## 2019-02-27 PROCEDURE — 25000003 PHARM REV CODE 250: Performed by: FAMILY MEDICINE

## 2019-02-27 PROCEDURE — 94660 CPAP INITIATION&MGMT: CPT

## 2019-02-27 PROCEDURE — 81003 URINALYSIS AUTO W/O SCOPE: CPT

## 2019-02-27 PROCEDURE — 85025 COMPLETE CBC W/AUTO DIFF WBC: CPT

## 2019-02-27 PROCEDURE — 21400001 HC TELEMETRY ROOM

## 2019-02-27 PROCEDURE — 87040 BLOOD CULTURE FOR BACTERIA: CPT

## 2019-02-27 PROCEDURE — 83605 ASSAY OF LACTIC ACID: CPT

## 2019-02-27 PROCEDURE — S0030 INJECTION, METRONIDAZOLE: HCPCS | Performed by: FAMILY MEDICINE

## 2019-02-27 PROCEDURE — 94761 N-INVAS EAR/PLS OXIMETRY MLT: CPT

## 2019-02-27 PROCEDURE — 84484 ASSAY OF TROPONIN QUANT: CPT

## 2019-02-27 RX ORDER — FUROSEMIDE 10 MG/ML
40 INJECTION INTRAMUSCULAR; INTRAVENOUS 2 TIMES DAILY
Status: DISCONTINUED | OUTPATIENT
Start: 2019-02-27 | End: 2019-03-01

## 2019-02-27 RX ORDER — ERYTHROMYCIN 5 MG/G
OINTMENT OPHTHALMIC NIGHTLY
Status: DISCONTINUED | OUTPATIENT
Start: 2019-02-27 | End: 2019-03-02 | Stop reason: HOSPADM

## 2019-02-27 RX ORDER — ACETAMINOPHEN 650 MG/1
650 SUPPOSITORY RECTAL EVERY 8 HOURS PRN
Status: DISCONTINUED | OUTPATIENT
Start: 2019-02-27 | End: 2019-03-02 | Stop reason: HOSPADM

## 2019-02-27 RX ORDER — AMIODARONE HYDROCHLORIDE 200 MG/1
200 TABLET ORAL NIGHTLY
Status: DISCONTINUED | OUTPATIENT
Start: 2019-02-27 | End: 2019-03-02 | Stop reason: HOSPADM

## 2019-02-27 RX ORDER — ASPIRIN 81 MG/1
81 TABLET ORAL DAILY
Status: DISCONTINUED | OUTPATIENT
Start: 2019-02-27 | End: 2019-03-02 | Stop reason: HOSPADM

## 2019-02-27 RX ORDER — HYDRALAZINE HYDROCHLORIDE 25 MG/1
25 TABLET, FILM COATED ORAL EVERY 8 HOURS
Status: DISCONTINUED | OUTPATIENT
Start: 2019-02-27 | End: 2019-03-02 | Stop reason: HOSPADM

## 2019-02-27 RX ORDER — AMLODIPINE BESYLATE 5 MG/1
10 TABLET ORAL DAILY
Status: DISCONTINUED | OUTPATIENT
Start: 2019-02-27 | End: 2019-03-02 | Stop reason: HOSPADM

## 2019-02-27 RX ORDER — FAMOTIDINE 20 MG/1
20 TABLET, FILM COATED ORAL 2 TIMES DAILY
Status: DISCONTINUED | OUTPATIENT
Start: 2019-02-27 | End: 2019-03-02 | Stop reason: HOSPADM

## 2019-02-27 RX ORDER — METOPROLOL TARTRATE 50 MG/1
50 TABLET ORAL 2 TIMES DAILY
Status: DISCONTINUED | OUTPATIENT
Start: 2019-02-27 | End: 2019-03-02 | Stop reason: HOSPADM

## 2019-02-27 RX ORDER — POTASSIUM CHLORIDE 20 MEQ/15ML
20 SOLUTION ORAL DAILY
Status: DISCONTINUED | OUTPATIENT
Start: 2019-02-27 | End: 2019-02-28

## 2019-02-27 RX ORDER — CETIRIZINE HYDROCHLORIDE 10 MG/1
10 TABLET ORAL DAILY
Status: DISCONTINUED | OUTPATIENT
Start: 2019-02-27 | End: 2019-03-02 | Stop reason: HOSPADM

## 2019-02-27 RX ORDER — HYDRALAZINE HYDROCHLORIDE 20 MG/ML
10 INJECTION INTRAMUSCULAR; INTRAVENOUS EVERY 8 HOURS PRN
Status: DISCONTINUED | OUTPATIENT
Start: 2019-02-27 | End: 2019-03-02 | Stop reason: HOSPADM

## 2019-02-27 RX ORDER — ATORVASTATIN CALCIUM 40 MG/1
40 TABLET, FILM COATED ORAL DAILY
Status: DISCONTINUED | OUTPATIENT
Start: 2019-02-27 | End: 2019-03-02 | Stop reason: HOSPADM

## 2019-02-27 RX ORDER — METRONIDAZOLE 500 MG/100ML
500 INJECTION, SOLUTION INTRAVENOUS
Status: DISCONTINUED | OUTPATIENT
Start: 2019-02-27 | End: 2019-03-02

## 2019-02-27 RX ORDER — TAMSULOSIN HYDROCHLORIDE 0.4 MG/1
0.8 CAPSULE ORAL NIGHTLY
Status: DISCONTINUED | OUTPATIENT
Start: 2019-02-27 | End: 2019-03-02 | Stop reason: HOSPADM

## 2019-02-27 RX ADMIN — AMLODIPINE BESYLATE 10 MG: 5 TABLET ORAL at 08:02

## 2019-02-27 RX ADMIN — FAMOTIDINE 20 MG: 20 TABLET ORAL at 09:02

## 2019-02-27 RX ADMIN — AMIODARONE HYDROCHLORIDE 200 MG: 200 TABLET ORAL at 09:02

## 2019-02-27 RX ADMIN — DEXTROSE MONOHYDRATE 25 G: 25 INJECTION, SOLUTION INTRAVENOUS at 05:02

## 2019-02-27 RX ADMIN — ACETAMINOPHEN 650 MG: 325 TABLET ORAL at 03:02

## 2019-02-27 RX ADMIN — APIXABAN 2.5 MG: 2.5 TABLET, FILM COATED ORAL at 09:02

## 2019-02-27 RX ADMIN — APIXABAN 2.5 MG: 2.5 TABLET, FILM COATED ORAL at 08:02

## 2019-02-27 RX ADMIN — CEFTRIAXONE 1 G: 1 INJECTION, SOLUTION INTRAVENOUS at 09:02

## 2019-02-27 RX ADMIN — FAMOTIDINE 20 MG: 20 TABLET ORAL at 08:02

## 2019-02-27 RX ADMIN — DEXTROSE MONOHYDRATE 12.5 G: 25 INJECTION, SOLUTION INTRAVENOUS at 01:02

## 2019-02-27 RX ADMIN — FUROSEMIDE 40 MG: 10 INJECTION, SOLUTION INTRAVENOUS at 09:02

## 2019-02-27 RX ADMIN — METOPROLOL TARTRATE 50 MG: 50 TABLET ORAL at 08:02

## 2019-02-27 RX ADMIN — HYDRALAZINE HYDROCHLORIDE 10 MG: 20 INJECTION INTRAMUSCULAR; INTRAVENOUS at 11:02

## 2019-02-27 RX ADMIN — HYDRALAZINE HYDROCHLORIDE 25 MG: 25 TABLET, FILM COATED ORAL at 11:02

## 2019-02-27 RX ADMIN — HYDRALAZINE HYDROCHLORIDE 25 MG: 25 TABLET, FILM COATED ORAL at 01:02

## 2019-02-27 RX ADMIN — POTASSIUM CHLORIDE 20 MEQ: 20 SOLUTION ORAL at 11:02

## 2019-02-27 RX ADMIN — TAMSULOSIN HYDROCHLORIDE 0.8 MG: 0.4 CAPSULE ORAL at 09:02

## 2019-02-27 RX ADMIN — METRONIDAZOLE 500 MG: 500 INJECTION, SOLUTION INTRAVENOUS at 06:02

## 2019-02-27 RX ADMIN — METOPROLOL TARTRATE 50 MG: 50 TABLET ORAL at 09:02

## 2019-02-27 RX ADMIN — CETIRIZINE HYDROCHLORIDE 10 MG: 10 TABLET, FILM COATED ORAL at 08:02

## 2019-02-27 RX ADMIN — FUROSEMIDE 40 MG: 10 INJECTION, SOLUTION INTRAVENOUS at 08:02

## 2019-02-27 RX ADMIN — ATORVASTATIN CALCIUM 40 MG: 40 TABLET, FILM COATED ORAL at 09:02

## 2019-02-27 RX ADMIN — ASPIRIN 81 MG: 81 TABLET, COATED ORAL at 08:02

## 2019-02-27 RX ADMIN — ERYTHROMYCIN 1 INCH: 5 OINTMENT OPHTHALMIC at 11:02

## 2019-02-27 NOTE — ED NOTES
Patients pants, underwear, and socks removed and bagged. Compression socks placed per verbal from Dr. Clayton, yellow slip socks placed, and condom catheter placed for urine collection.

## 2019-02-27 NOTE — H&P
"[facility]  History & Physical    Patient Name: Aleksandr Schultz  MRN: [id number]  Admission Date: 2/26/2019  Attending Physician: No att. providers found   Primary Care Provider: Ascencion Cedeño MD         Patient information was obtained from patient, past medical records and ER records.     Subjective:     Principal Problem:<principal problem not specified>    Chief Complaint:   Chief Complaint   Patient presents with    Shortness of Breath     Was seen at clinic and was told he is having CHF exacerbation. Pitting edema to BLE. Pt's daughter reports confusion and more fatigued.         HPI: Aleksandr Schultz (Jimmy) is a 89 yo male with HTN, HLD, CAD, chronic diastolic  Dysfunction, bilateral carotid artery disease, CVA, AAA, PAD, paroxysmal atrial fibrillation, type 2 diabetes mellitus, nonrheumatic aortic valve stenosis, sinus noemy-tachy syndrome and obstructive sleep apnea. Per daughter at bedside patient  AAOx3 at baseline, very active and still drives. He lives in [place], [place]. His primary care physician is Dr. Ascencion Cedeño. His cardiologist is Dr. Lynne Buckner.  He presented to [facility] 2/26/19 with complaint of worsening SOB/GONZALEZ and bilateral lower extremity edema over the past 3 days. He attended a birthday party on Saturday, 2/23 . He ate salty food. On Sunday, 2/24, he felt weak and short of breath.His daughter took him to ER where he was checked, but except for O2 sats of 91-93% they found no abnormalities.  Medications were not changed at the time. The patient followed up with his cardiologist on 2/25. His lasix was increased to 40 mg BID at the time. Per daughter at bedside patient with ongoing SOB, worse than baseline, lethargy and lower extremity  edema despite increased po lasix. He has also had decreased urinary output, non-productive cough and very little sleep due to SOB when lying down.   Labs in ED remarkable for WBC (12.80), na (128), AST (95), ALT " (100), BNP (416). Troponin negative. CXR shows course asymmetric opacities seen within the left lower lung zone.  This may represent chronic change or atelectasis although difficult to exclude potential aspiration or developing pneumonia in the right clinical setting.  No focal consolidation seen. Low grade temp (99.8) on presentation, BP stable. Patient received 80 mg IV lasix in ED. Patient admitted to [facility] for further care.       Past Medical History:   Diagnosis Date    Aortic aneurysm     Asthma, chronic     Chronic kidney disease     CKD (chronic kidney disease)     COPD (chronic obstructive pulmonary disease)     Coronary artery disease     Diabetes mellitus     Glaucoma (increased eye pressure)     Hypertension     Peripheral vascular disease     Prostate cancer     Sleep apnea     cpap    Small bowel obstruction     Status post balloon angioplasty of pulmonary artery branches     1980    Stroke     1991       Past Surgical History:   Procedure Laterality Date    BACK SURGERY      x2 lumbar    CAROTID ARTERY ANGIOPLASTY      COLONOSCOPY N/A 3/25/2013    Performed by [facility] at [facility] (4[facility] FLR)    HERNIA REPAIR      PI OU      laser PI OU    SHOULDER SURGERY      rotator bilateral       Review of patient's allergies indicates:   Allergen Reactions    Iodinated contrast- oral and iv dye     Lisinopril Swelling       No current facility-administered medications on file prior to encounter.      Current Outpatient Medications on File Prior to Encounter   Medication Sig    albuterol (PROVENTIL) 2.5 mg /3 mL (0.083 %) nebulizer solution Take 2.5 mg by nebulization 4 (four) times daily as needed.     amiodarone (PACERONE) 200 MG Tab TAKE 1 TABLET ONE TIME DAILY. TAKE AT NIGHT PER DR. LIPSCOMB ON 6/23/17.    amLODIPine (NORVASC) 10 MG tablet TAKE 1 TABLET EVERY DAY    aspirin (ECOTRIN) 81 MG EC tablet Take 81 mg by mouth once daily.      atorvastatin  (LIPITOR) 40 MG tablet Take 40 mg by mouth once daily.    cyclobenzaprine (FLEXERIL) 10 MG tablet Take 10 mg by mouth 3 (three) times daily as needed. Pt taking one pill at night.    ELIQUIS 2.5 mg Tab TAKE 1 TABLET TWICE DAILY    furosemide (LASIX) 40 MG tablet TAKE 1 TABLET EVERY DAY    glimepiride (AMARYL) 4 MG tablet Take 4 mg by mouth 2 (two) times daily.     hydrALAZINE (APRESOLINE) 25 MG tablet Take 1 tablet (25 mg total) by mouth every 8 (eight) hours.    ipratropium (ATROVENT) 0.02 % nebulizer solution Take 500 mcg by nebulization 4 (four) times daily. PRN    loratadine 10 mg Cap Take 1 tablet by mouth once daily.     metoprolol tartrate (LOPRESSOR) 50 MG tablet Take 50 mg by mouth 2 (two) times daily.    mometasone (NASONEX) 50 mcg/actuation nasal spray 2 sprays by Nasal route daily as needed.     nitroGLYCERIN (NITROSTAT) 0.4 MG SL tablet Place 0.4 mg under the tongue every 5 (five) minutes as needed (PRN).     pioglitazone (ACTOS) 15 MG tablet Take 15 mg by mouth once daily.    potassium chloride (MICRO-K) 10 MEQ CpSR TAKE 1 CAPSULE EVERY DAY    ranitidine (ZANTAC) 150 MG tablet Take 150 mg by mouth 2 (two) times daily. Pt taking prn.    tamsulosin (FLOMAX) 0.4 mg Cp24 Take 0.8 mg by mouth every evening.     vitamin D 1000 units Tab Take 1,000 Units by mouth once daily.     Family History     Problem Relation (Age of Onset)    Heart attack Father    Heart disease Father        Tobacco Use    Smoking status: Former Smoker     Types: Cigarettes     Last attempt to quit: 10/11/1977     Years since quittin.4    Smokeless tobacco: Never Used   Substance and Sexual Activity    Alcohol use: No    Drug use: No    Sexual activity: Not Currently     Review of Systems   Constitutional: Negative for chills and fever.   Eyes: Negative for photophobia.   Respiratory: Positive for cough and shortness of breath. Negative for chest tightness and wheezing.    Cardiovascular: Positive for leg  swelling. Negative for chest pain and palpitations.   Gastrointestinal: Negative for abdominal pain, diarrhea, nausea and vomiting.   Genitourinary: Negative for dysuria, flank pain and hematuria.   Musculoskeletal: Negative for back pain, myalgias and neck pain.   Skin: Negative for rash and wound.   Neurological: Positive for weakness. Negative for dizziness, syncope and headaches.   Psychiatric/Behavioral: Negative for agitation.     Objective:     Vital Signs (Most Recent):  Temp: 98.9 °F (37.2 °C) (02/27/19 0601)  Pulse: 70 (02/27/19 0601)  Resp: 18 (02/27/19 0601)  BP: (!) 155/68 (02/27/19 0601)  SpO2: 95 % (02/27/19 0601) Vital Signs (24h Range):  Temp:  [98.3 °F (36.8 °C)-99.7 °F (37.6 °C)] 98.9 °F (37.2 °C)  Pulse:  [60-70] 70  Resp:  [17-36] 18  SpO2:  [95 %-97 %] 95 %  BP: (155-188)/(68-83) 155/68     Weight: 81.6 kg (180 lb)  Body mass index is 27.37 kg/m².    Physical Exam   Constitutional: He is oriented to person, place, and time. He appears well-developed and well-nourished. No distress.   HENT:   Head: Normocephalic and atraumatic.   Eyes: Conjunctivae are normal. Pupils are equal, round, and reactive to light.   Neck: Normal range of motion. Neck supple. No JVD present.   Cardiovascular: Normal rate, regular rhythm and intact distal pulses.   Murmur heard.  Pulmonary/Chest: Effort normal and breath sounds normal. No respiratory distress. He has no wheezes.   Abdominal: Soft. Bowel sounds are normal. He exhibits distension. There is no tenderness. There is no guarding.   Musculoskeletal: Normal range of motion. He exhibits edema (1+ bilateral lower extremities ). He exhibits no tenderness.   Neurological: He is alert and oriented to person, place, and time.   Skin: Skin is warm and dry. Capillary refill takes less than 2 seconds. No erythema.   Psychiatric: He has a normal mood and affect. His behavior is normal.         CRANIAL NERVES     CN III, IV, VI   Pupils are equal, round, and reactive to  light.       Significant Labs:   BMP:   Recent Labs   Lab 02/26/19  1811   GLU 97   *   K 3.6   CL 92*   CO2 23   BUN 20   CREATININE 1.3   CALCIUM 8.5*     CMP:   Recent Labs   Lab 02/26/19  1811   *   K 3.6   CL 92*   CO2 23   GLU 97   BUN 20   CREATININE 1.3   CALCIUM 8.5*   PROT 7.0   ALBUMIN 3.5   BILITOT 1.3*   ALKPHOS 123   AST 95*   *   ANIONGAP 13   EGFRNONAA 47*     Troponin:   Recent Labs   Lab 02/26/19  1811   TROPONINI <0.006       Significant Imaging: I have reviewed all pertinent imaging results/findings within the past 24 hours.    Assessment/Plan:     Acute diastolic congestive heart failure    -continue lasix 40 mg BID   -strict intake/output and daily weights   -cardiac  Diet   -echo  -cardiology consulted per ED  -monitor tele        CKD (chronic kidney disease) stage 3, GFR 30-59 ml/min    BUN/CR 20/1.3   Monitor renal function   Avoid nephrotoxic meds        Hypertension associated with diabetes    Hyperlipidemia   PAD    -188   Continue amlodipine, metoprolol, ASA/statin      MICHELLE on CPAP    CPAP per home settings        Cerebrovascular accident (CVA)    Continue asa/statin        Paroxysmal atrial fibrillation    Continue  Amiodarone and eliquis  Monitor tele        Type 2 diabetes, controlled, with neuropathy    A1C 6.8   Hold po medications   SSI   Low dose SSI   Accucheck AC&HS        Nonrheumatic aortic valve stenosis    Chronic        COPD (chronic obstructive pulmonary disease)    Chronic   Continue supplemental O2, wean as tolerated   Pt not on home O2        Bilateral carotid artery disease    Chronic   Taking ASA/statin        AAA (abdominal aortic aneurysm)    Chronic         VTE Risk Mitigation (From admission, onward)        Ordered     apixaban tablet 2.5 mg  2 times daily      02/27/19 0603     IP VTE HIGH RISK PATIENT  Once      02/26/19 2116             Flora Emmanuel NP  Department of Hospital Medicine   [facility]

## 2019-02-27 NOTE — ED NOTES
Recd report and assumed care of patient at this time. Pt resting quietly on stretcher in NAD. Awaiting room assignment.Resp even unlabored. Pt on O22L NC.

## 2019-02-27 NOTE — SUBJECTIVE & OBJECTIVE
Past Medical History:   Diagnosis Date    Aortic aneurysm     Asthma, chronic     Chronic kidney disease     CKD (chronic kidney disease)     COPD (chronic obstructive pulmonary disease)     Coronary artery disease     Diabetes mellitus     Glaucoma (increased eye pressure)     Hypertension     Peripheral vascular disease     Prostate cancer     Sleep apnea     cpap    Small bowel obstruction     Status post balloon angioplasty of pulmonary artery branches     1980    Stroke     1991       Past Surgical History:   Procedure Laterality Date    BACK SURGERY      x2 lumbar    CAROTID ARTERY ANGIOPLASTY      COLONOSCOPY N/A 3/25/2013    Performed by Wiliam [facility] MD at [facility] END[facility] (4TH FLR)    HERNIA REPAIR      PI OU      laser PI OU    SHOULDER SURGERY      rotator bilateral       Review of patient's allergies indicates:   Allergen Reactions    Iodinated contrast- oral and iv dye     Lisinopril Swelling       No current facility-administered medications on file prior to encounter.      Current Outpatient Medications on File Prior to Encounter   Medication Sig    albuterol (PROVENTIL) 2.5 mg /3 mL (0.083 %) nebulizer solution Take 2.5 mg by nebulization 4 (four) times daily as needed.     amiodarone (PACERONE) 200 MG Tab TAKE 1 TABLET ONE TIME DAILY. TAKE AT NIGHT PER DR. LIPSCOMB ON 6/23/17.    amLODIPine (NORVASC) 10 MG tablet TAKE 1 TABLET EVERY DAY    aspirin (ECOTRIN) 81 MG EC tablet Take 81 mg by mouth once daily.      atorvastatin (LIPITOR) 40 MG tablet Take 40 mg by mouth once daily.    cyclobenzaprine (FLEXERIL) 10 MG tablet Take 10 mg by mouth 3 (three) times daily as needed. Pt taking one pill at night.    ELIQUIS 2.5 mg Tab TAKE 1 TABLET TWICE DAILY    furosemide (LASIX) 40 MG tablet TAKE 1 TABLET EVERY DAY    glimepiride (AMARYL) 4 MG tablet Take 4 mg by mouth 2 (two) times daily.     hydrALAZINE (APRESOLINE) 25 MG tablet Take 1 tablet (25 mg total) by mouth every 8  (eight) hours.    ipratropium (ATROVENT) 0.02 % nebulizer solution Take 500 mcg by nebulization 4 (four) times daily. PRN    loratadine 10 mg Cap Take 1 tablet by mouth once daily.     metoprolol tartrate (LOPRESSOR) 50 MG tablet Take 50 mg by mouth 2 (two) times daily.    mometasone (NASONEX) 50 mcg/actuation nasal spray 2 sprays by Nasal route daily as needed.     nitroGLYCERIN (NITROSTAT) 0.4 MG SL tablet Place 0.4 mg under the tongue every 5 (five) minutes as needed (PRN).     pioglitazone (ACTOS) 15 MG tablet Take 15 mg by mouth once daily.    potassium chloride (MICRO-K) 10 MEQ CpSR TAKE 1 CAPSULE EVERY DAY    ranitidine (ZANTAC) 150 MG tablet Take 150 mg by mouth 2 (two) times daily. Pt taking prn.    tamsulosin (FLOMAX) 0.4 mg Cp24 Take 0.8 mg by mouth every evening.     vitamin D 1000 units Tab Take 1,000 Units by mouth once daily.     Family History     Problem Relation (Age of Onset)    Heart attack Father    Heart disease Father        Tobacco Use    Smoking status: Former Smoker     Types: Cigarettes     Last attempt to quit: 10/11/1977     Years since quittin.4    Smokeless tobacco: Never Used   Substance and Sexual Activity    Alcohol use: No    Drug use: No    Sexual activity: Not Currently     Review of Systems   Constitutional: Negative for chills and fever.   Eyes: Negative for photophobia.   Respiratory: Positive for cough and shortness of breath. Negative for chest tightness and wheezing.    Cardiovascular: Positive for leg swelling. Negative for chest pain and palpitations.   Gastrointestinal: Negative for abdominal pain, diarrhea, nausea and vomiting.   Genitourinary: Negative for dysuria, flank pain and hematuria.   Musculoskeletal: Negative for back pain, myalgias and neck pain.   Skin: Negative for rash and wound.   Neurological: Positive for weakness. Negative for dizziness, syncope and headaches.   Psychiatric/Behavioral: Negative for agitation.     Objective:      Vital Signs (Most Recent):  Temp: 98.9 °F (37.2 °C) (02/27/19 0601)  Pulse: 70 (02/27/19 0601)  Resp: 18 (02/27/19 0601)  BP: (!) 155/68 (02/27/19 0601)  SpO2: 95 % (02/27/19 0601) Vital Signs (24h Range):  Temp:  [98.3 °F (36.8 °C)-99.7 °F (37.6 °C)] 98.9 °F (37.2 °C)  Pulse:  [60-70] 70  Resp:  [17-36] 18  SpO2:  [95 %-97 %] 95 %  BP: (155-188)/(68-83) 155/68     Weight: 81.6 kg (180 lb)  Body mass index is 27.37 kg/m².    Physical Exam   Constitutional: He is oriented to person, place, and time. He appears well-developed and well-nourished. No distress.   HENT:   Head: Normocephalic and atraumatic.   Eyes: Conjunctivae are normal. Pupils are equal, round, and reactive to light.   Neck: Normal range of motion. Neck supple. No JVD present.   Cardiovascular: Normal rate, regular rhythm and intact distal pulses.   Murmur heard.  Pulmonary/Chest: Effort normal and breath sounds normal. No respiratory distress. He has no wheezes.   Abdominal: Soft. Bowel sounds are normal. He exhibits distension. There is no tenderness. There is no guarding.   Musculoskeletal: Normal range of motion. He exhibits edema (1+ bilateral lower extremities ). He exhibits no tenderness.   Neurological: He is alert and oriented to person, place, and time.   Skin: Skin is warm and dry. Capillary refill takes less than 2 seconds. No erythema.   Psychiatric: He has a normal mood and affect. His behavior is normal.         CRANIAL NERVES     CN III, IV, VI   Pupils are equal, round, and reactive to light.       Significant Labs:   BMP:   Recent Labs   Lab 02/26/19  1811   GLU 97   *   K 3.6   CL 92*   CO2 23   BUN 20   CREATININE 1.3   CALCIUM 8.5*     CMP:   Recent Labs   Lab 02/26/19  1811   *   K 3.6   CL 92*   CO2 23   GLU 97   BUN 20   CREATININE 1.3   CALCIUM 8.5*   PROT 7.0   ALBUMIN 3.5   BILITOT 1.3*   ALKPHOS 123   AST 95*   *   ANIONGAP 13   EGFRNONAA 47*     Troponin:   Recent Labs   Lab 02/26/19  1811   TROPONINI  <0.006       Significant Imaging: I have reviewed all pertinent imaging results/findings within the past 24 hours.

## 2019-02-27 NOTE — HPI
"Aleksandr Schultz (Jimmy) is a 89 yo male with HTN, HLD, CAD, chronic diastolic  Dysfunction, bilateral carotid artery disease, CVA, AAA, PAD, paroxysmal atrial fibrillation, type 2 diabetes mellitus, nonrheumatic aortic valve stenosis, sinus noemy-tachy syndrome and obstructive sleep apnea. Per daughter at bedside patient  AAOx3 at baseline, very active and still drives. He lives in [place], [place]. His primary care physician is Dr. Ascencion Cedeño. His cardiologist is Dr. Lynne Buckner.  He presented to [facility] 2/26/19 with complaint of worsening SOB/GONZALEZ and bilateral lower extremity edema over the past 3 days. He attended a birthday party on Saturday, 2/23 . He ate salty food. On Sunday, 2/24, he felt weak and short of breath.His daughter took him to ER where he was checked, but except for O2 sats of 91-93% they found no abnormalities.  Medications were not changed at the time. The patient followed up with his cardiologist on 2/25. His lasix was increased to 40 mg BID at the time. Per daughter at bedside patient with ongoing SOB, worse than baseline, lethargy and lower extremity  edema despite increased po lasix. He has also had decreased urinary output, non-productive cough and very little sleep due to SOB when lying down.   Labs in ED remarkable for WBC (12.80), na (128), AST (95), ALT (100), BNP (416). Troponin negative. CXR shows course asymmetric opacities seen within the left lower lung zone.  This may represent chronic change or atelectasis although difficult to exclude potential aspiration or developing pneumonia in the right clinical setting.  No focal consolidation seen. Low grade temp (99.8) on presentation, BP stable. Patient received 80 mg IV lasix in ED. Patient admitted to [facility] for further care.     "

## 2019-02-27 NOTE — SUBJECTIVE & OBJECTIVE
Past Medical History:   Diagnosis Date    Aortic aneurysm     Asthma, chronic     Chronic kidney disease     CKD (chronic kidney disease)     COPD (chronic obstructive pulmonary disease)     Coronary artery disease     Diabetes mellitus     Glaucoma (increased eye pressure)     Hypertension     Peripheral vascular disease     Prostate cancer     Sleep apnea     cpap    Small bowel obstruction     Status post balloon angioplasty of pulmonary artery branches     1980    Stroke     1991       Past Surgical History:   Procedure Laterality Date    BACK SURGERY      x2 lumbar    CAROTID ARTERY ANGIOPLASTY      COLONOSCOPY N/A 3/25/2013    Performed by [facility] at [facility] (4TH FLR)    HERNIA REPAIR      PI OU      laser PI OU    SHOULDER SURGERY      rotator bilateral       Review of patient's allergies indicates:   Allergen Reactions    Iodinated contrast- oral and iv dye     Lisinopril Swelling       No current facility-administered medications on file prior to encounter.      Current Outpatient Medications on File Prior to Encounter   Medication Sig    albuterol (PROVENTIL) 2.5 mg /3 mL (0.083 %) nebulizer solution Take 2.5 mg by nebulization 4 (four) times daily as needed.     amiodarone (PACERONE) 200 MG Tab TAKE 1 TABLET ONE TIME DAILY. TAKE AT NIGHT PER DR. LIPSCOMB ON 6/23/17.    amLODIPine (NORVASC) 10 MG tablet TAKE 1 TABLET EVERY DAY    aspirin (ECOTRIN) 81 MG EC tablet Take 81 mg by mouth once daily.      atorvastatin (LIPITOR) 40 MG tablet Take 40 mg by mouth once daily.    cyclobenzaprine (FLEXERIL) 10 MG tablet Take 10 mg by mouth 3 (three) times daily as needed. Pt taking one pill at night.    ELIQUIS 2.5 mg Tab TAKE 1 TABLET TWICE DAILY    furosemide (LASIX) 40 MG tablet TAKE 1 TABLET EVERY DAY    glimepiride (AMARYL) 4 MG tablet Take 4 mg by mouth 2 (two) times daily.     hydrALAZINE (APRESOLINE) 25 MG tablet Take 1 tablet (25 mg total) by mouth every 8  (eight) hours.    ipratropium (ATROVENT) 0.02 % nebulizer solution Take 500 mcg by nebulization 4 (four) times daily. PRN    loratadine 10 mg Cap Take 1 tablet by mouth once daily.     metoprolol tartrate (LOPRESSOR) 50 MG tablet Take 50 mg by mouth 2 (two) times daily.    mometasone (NASONEX) 50 mcg/actuation nasal spray 2 sprays by Nasal route daily as needed.     nitroGLYCERIN (NITROSTAT) 0.4 MG SL tablet Place 0.4 mg under the tongue every 5 (five) minutes as needed (PRN).     pioglitazone (ACTOS) 15 MG tablet Take 15 mg by mouth once daily.    potassium chloride (MICRO-K) 10 MEQ CpSR TAKE 1 CAPSULE EVERY DAY    ranitidine (ZANTAC) 150 MG tablet Take 150 mg by mouth 2 (two) times daily. Pt taking prn.    tamsulosin (FLOMAX) 0.4 mg Cp24 Take 0.8 mg by mouth every evening.     vitamin D 1000 units Tab Take 1,000 Units by mouth once daily.     Family History     Problem Relation (Age of Onset)    Heart attack Father    Heart disease Father        Tobacco Use    Smoking status: Former Smoker     Types: Cigarettes     Last attempt to quit: 10/11/1977     Years since quittin.4    Smokeless tobacco: Never Used   Substance and Sexual Activity    Alcohol use: No    Drug use: No    Sexual activity: Not Currently     Review of Systems   Constitution: Negative for chills, decreased appetite, diaphoresis, fever and weakness.   Cardiovascular: Positive for dyspnea on exertion, orthopnea and paroxysmal nocturnal dyspnea. Negative for chest pain, claudication, cyanosis, irregular heartbeat, leg swelling, near-syncope, palpitations and syncope.   Respiratory: Negative for cough, hemoptysis, shortness of breath and wheezing.    Gastrointestinal: Negative for bloating, abdominal pain, constipation, diarrhea, melena, nausea and vomiting.   Neurological: Negative for dizziness.     Objective:     Vital Signs (Most Recent):  Temp: 98.9 °F (37.2 °C) (19 0601)  Pulse: 72 (19 1300)  Resp: (!) 26 (19  1300)  BP: (!) 148/73 (02/27/19 1300)  SpO2: (!) 93 % (02/27/19 1300) Vital Signs (24h Range):  Temp:  [98.3 °F (36.8 °C)-99.7 °F (37.6 °C)] 98.9 °F (37.2 °C)  Pulse:  [60-76] 72  Resp:  [17-36] 26  SpO2:  [92 %-97 %] 93 %  BP: (148-211)/(68-92) 148/73     Weight: 81.6 kg (180 lb)  Body mass index is 27.37 kg/m².    SpO2: (!) 93 %  O2 Device (Oxygen Therapy): room air      Intake/Output Summary (Last 24 hours) at 2/27/2019 1434  Last data filed at 2/27/2019 1347  Gross per 24 hour   Intake 370 ml   Output 2530 ml   Net -2160 ml       Lines/Drains/Airways     Peripheral Intravenous Line                 Peripheral IV - Single Lumen 02/26/19 1812 Left Forearm less than 1 day                Physical Exam   Constitutional: He is oriented to person, place, and time. He appears well-developed and well-nourished. No distress.   Neck: JVD present.   Cardiovascular: Normal rate and regular rhythm. Exam reveals no gallop.   No murmur heard.  Pulmonary/Chest: Effort normal. No respiratory distress. He has decreased breath sounds. He has no wheezes.   Abdominal: Soft. Bowel sounds are normal. He exhibits no distension. There is no tenderness.   Neurological: He is alert and oriented to person, place, and time.   Skin: Skin is warm and dry.       Significant Labs:     Recent Labs   Lab 02/27/19  1151   TROPONINI 0.020     Recent Labs   Lab 02/27/19  0640   WBC 9.99   RBC 3.75*   HGB 11.6*   HCT 34.7*      MCV 93   MCH 30.9   MCHC 33.4     Recent Labs   Lab 02/26/19  1811 02/27/19  0640   CALCIUM 8.5* 8.7   PROT 7.0  --    * 131*   K 3.6 3.1*   CO2 23 27   CL 92* 94*   BUN 20 19   CREATININE 1.3 1.4   ALKPHOS 123  --    *  --    AST 95*  --    BILITOT 1.3*  --        Significant Imaging: Echocardiogram:   Repeat TTE 2/27/2019 with pending results   2D echo with color flow doppler:   Results for orders placed or performed in visit on 04/20/17   2D echo with color flow doppler   Result Value Ref Range    QEF 60  55 - 65    Diastolic Dysfunction Yes (A)     Aortic Valve Regurgitation MILD TO MODERATE (A)     Aortic Valve Stenosis MILD (A)     Est. PA Systolic Pressure 35.95     Mitral Valve Mobility NORMAL     Tricuspid Valve Regurgitation MILD

## 2019-02-27 NOTE — ASSESSMENT & PLAN NOTE
-creatinine 1.4 GFR 43  -baseline creatinine 1.1-1.8 GFR 37 to >60  -continue to monitor closely with aggressive diuresis

## 2019-02-27 NOTE — PROGRESS NOTES
Patient does not wish to wear CPAP at this time, but says he may want to later. CPAP on standby.

## 2019-02-27 NOTE — ED PROVIDER NOTES
Encounter Date: 2/26/2019       History     Chief Complaint   Patient presents with    Shortness of Breath     Was seen at clinic and was told he is having CHF exacerbation. Pitting edema to BLE. Pt's daughter reports confusion and more fatigued.      89-year-old male with PMH of CKD, CAD, COPD, DM, HTN presents the ED with his daughter with complaints of worsening shortness of breath and pedal edema times 5-6 days.  Patient's daughter provides history stating that she took him to an outside hospital and cough [place] 3 days ago where he was diagnosed with fluid on his lungs discharge. She followed up with his cardiologist 2 days ago who placed him on 40 mg Lasix t.i.d., but the daughter states that he has not had any increase in urination and his symptoms have worsened.  She states today he has been lethargic and appeared confused.  Also admits to cough. denies abdominal pain, chest pain, fever      The history is provided by the patient. No  was used.     Review of patient's allergies indicates:   Allergen Reactions    Iodinated contrast- oral and iv dye     Lisinopril Swelling     Past Medical History:   Diagnosis Date    Aortic aneurysm     Asthma, chronic     Chronic kidney disease     CKD (chronic kidney disease)     COPD (chronic obstructive pulmonary disease)     Coronary artery disease     Diabetes mellitus     Glaucoma (increased eye pressure)     Hypertension     Peripheral vascular disease     Prostate cancer     Sleep apnea     cpap    Small bowel obstruction     Status post balloon angioplasty of pulmonary artery branches     1980    Stroke     1991     Past Surgical History:   Procedure Laterality Date    BACK SURGERY      x2 lumbar    CAROTID ARTERY ANGIOPLASTY      COLONOSCOPY N/A 3/25/2013    Performed by [facility]am [facility] at [facility] (4TH FLR)    HERNIA REPAIR      PI OU      laser PI OU    SHOULDER SURGERY      rotator bilateral     Family  History   Problem Relation Age of Onset    Heart attack Father     Heart disease Father     Prostate cancer Neg Hx     Kidney disease Neg Hx     Heart failure Neg Hx     Hyperlipidemia Neg Hx     Stroke Neg Hx     Hypertension Neg Hx      Social History     Tobacco Use    Smoking status: Former Smoker     Types: Cigarettes     Last attempt to quit: 10/11/1977     Years since quittin.4    Smokeless tobacco: Never Used   Substance Use Topics    Alcohol use: No    Drug use: No     Review of Systems   Constitutional: Negative for chills and fever.   Respiratory: Positive for cough and shortness of breath.    Cardiovascular: Positive for leg swelling. Negative for chest pain.   Gastrointestinal: Negative for abdominal pain, constipation, diarrhea, nausea and vomiting.   Genitourinary: Negative for flank pain and frequency.   Psychiatric/Behavioral: Positive for confusion.   All other systems reviewed and are negative.      Physical Exam     Initial Vitals [19 1539]   BP Pulse Resp Temp SpO2   (!) 188/78 66 (!) 24 99.7 °F (37.6 °C) 95 %      MAP       --         Physical Exam    Nursing note and vitals reviewed.  Constitutional: He appears well-developed and well-nourished. No distress.   HENT:   Head: Normocephalic and atraumatic.   Nose: Nose normal.   Eyes: Conjunctivae are normal.   Neck: Normal range of motion.   Cardiovascular: Normal rate, regular rhythm and normal heart sounds.   3+ pitting edema to just below the knees bilaterally   Pulmonary/Chest: Effort normal. He has rales in the right lower field.   Abdominal: Soft. Bowel sounds are normal. He exhibits distension (Mild) and fluid wave. There is no tenderness. There is no rigidity, no guarding and no CVA tenderness.   Musculoskeletal: Normal range of motion.   Neurological: He is alert and oriented to person, place, and time.   Skin: Skin is warm and dry.   Psychiatric: He has a normal mood and affect. His speech is normal and behavior  is normal. Judgment and thought content normal. Cognition and memory are normal.   Patient is able to answer basic questions regarding symptoms, daughter states he is usually talkative with a good memory         ED Course   Procedures  Labs Reviewed   CBC W/ AUTO DIFFERENTIAL - Abnormal; Notable for the following components:       Result Value    WBC 12.80 (*)     RBC 3.71 (*)     Hemoglobin 11.7 (*)     Hematocrit 34.7 (*)     MCH 31.5 (*)     Gran # (ANC) 9.4 (*)     Mono # 1.6 (*)     Lymph% 13.4 (*)     All other components within normal limits   COMPREHENSIVE METABOLIC PANEL - Abnormal; Notable for the following components:    Sodium 128 (*)     Chloride 92 (*)     Calcium 8.5 (*)     Total Bilirubin 1.3 (*)     AST 95 (*)      (*)     eGFR if  55 (*)     eGFR if non  47 (*)     All other components within normal limits   B-TYPE NATRIURETIC PEPTIDE - Abnormal; Notable for the following components:     (*)     All other components within normal limits   TROPONIN I          Imaging Results          X-Ray Chest AP Portable (Edited Result - FINAL)  Result time 02/26/19 18:18:03    Addendum 1 of 1 by Ragini Samuel MD (02/26/19 18:18:03)      Future radiographic follow-up may be obtained to assess for resolution/interval clearing.      Electronically signed by: Ragini Samuel MD  Date:    02/26/2019  Time:    18:18               Final result by Ragini Samuel MD (02/26/19 18:14:43)                 Impression:      Course asymmetric opacities seen within the left lower lung zone.  This may represent chronic change or atelectasis although difficult to exclude potential aspiration or developing pneumonia in the right clinical setting.  No focal consolidation seen.      Electronically signed by: Ragini Samuel MD  Date:    02/26/2019  Time:    18:14             Narrative:    EXAMINATION:  XR CHEST AP PORTABLE    CLINICAL HISTORY:  Chest Pain;    TECHNIQUE:  Single  frontal view of the chest was performed.    COMPARISON:  May 2017.    FINDINGS:  Heart is stable in size.  There is elevation of the right hemidiaphragm resulting in asymmetric volume loss on the right.  Coarse opacities are seen within the left lower lung zone.  This could reflect chronic change although difficult to exclude aspiration or developing pneumonia in the right clinical setting.  No evidence of confluent focal consolidation.  No evidence of pneumothorax or large effusion.  No acute osseous abnormality identified.  Postsurgical changes are seen involving the bilateral shoulders.                                 Medical Decision Making:   Initial Assessment:   89 year old male with 5-6 days of worsening shortness of breath and pedal edema despite 2 days of 40 mg Lasix t.i.d.   Differential Diagnosis:   Differential Diagnosis includes, but is not limited to:  PE, MI/ACS, pneumothorax, pericardial effusion/tamonade, pneumonia, lung abscess, pericarditis/myocarditis, pleural effusion, lung mass, CHF exacerbation, asthma exacerbation, COPD exacerbation, aspirated/ingested foreign body, airway obstruction, CO poisoning, anemia, metabolic derangement, allergy/atopy, influenza, viral URI, viral syndrome.    Clinical Tests:   Lab Tests: Ordered and Reviewed  Radiological Study: Ordered and Reviewed  ED Management:  BNP elevated, hyponatremia, elevated liver enzymes, Chest x-ray abnormal with opacities in the left lower lobe, WBC mildly elevated.   8:06 PM  Discussed patient with Dr Morejon, Cardiology, who suggests to trial the patient on IV lasix 80mg and admit to inpatient.   [facility] paged.  8:13 PM  Patient will be admitted to [facility]                      Clinical Impression:       ICD-10-CM ICD-9-CM   1. Acute on chronic congestive heart failure, unspecified heart failure type I50.9 428.0   2. Chest pain R07.9 786.50   3. Hyponatremia E87.1 276.1   4. Elevated liver enzymes R74.8  790.5   5. Left lower lobe pneumonia J18.1 486   6. CHF exacerbation I50.9 428.0                                Zuly Delgado PA-C  02/26/19 6749

## 2019-02-27 NOTE — ASSESSMENT & PLAN NOTE
-continue lasix 40 mg BID   -strict intake/output and daily weights   -cardiac  Diet   -echo  -cardiology consulted per ED  -monitor tele

## 2019-02-27 NOTE — HPI
89yo male with history of CAD s/p PCI, AAA, carotid stenosis, COPD, PAD, AI, HLP, DMII, PAF, HFpEF, MICHELLE and CKD stage III who presents to the ER with complaints of BLE swelling and SOB. Mr. Schultz is accompanied by his daughter who helps provide his history. She reports he is very independent and helps care for his wife on hospice with dementia. He continues to drive and went to a basketball game last week. His daughter noted him to be more SOB with LE swelling prompting him to present to the ER at [facility]. He had labs and was observed and was sent home with no new medications. He reports eating more salt than usual after attending a birthday party and eating a fried seafood platter. He was seen by Dr. Buckner on Monday with increase in his Lasix from daily to TID for volume removal. His daughter reports he did this Monday evening and Tuesday morning but she noted his swelling to be worsening and noted him to be SOB at rest prompting him to present to the ER.

## 2019-02-27 NOTE — ED NOTES
Patient resting in bed with daughter at the bedside. Patient sleeping but wakes to verbal stimuli. Breath sounds clear bilaterally. Skin is warm and dry. VSS. SR up x 2, call light in reach, and bed in lowest position. Information board has been updated and patient/daughter instructed to use call light for assistance.

## 2019-02-27 NOTE — HOSPITAL COURSE
CXR reports possible aspiration Pneumonia. Start Rocephin and Flagyl       2/28 TSH has been chronically low since 2017 with no meds on record. Start synthroid. Repeat CXR in AM, PT/OT  3/1 switch Lasix to PO, replace K for possible d/c tomorrow  3/2 discharge today

## 2019-02-27 NOTE — PLAN OF CARE
VN cued into pt's room for introduction with pt's permission. Daughter José Miguel at bedside and states pt is [place]. Family requested VN to come back d/t patient setting up to eat his dinner tray. Will try back and cont to be available as needed.

## 2019-02-27 NOTE — ED NOTES
Pt c/o SOB and edema to BLE, worse today. Daughter states pt was recently seen at a hospital in [facility] [place], then was seen at clinic and told he was having a CHF exacerbation. Reports pt just had his lasix dose increased from 40mg BID to 40mg TID, but that swelling to his legs has worsened significantly since this morning. 4+ pitting edema noted bilaterally. Respirations unlabored, but pt appears SOB on exertion. Daughter states pt has also been weak and fatigued since about Friday. Pt c/o chronic back pain and of pain to his chest only when coughing. Denies recent fever.

## 2019-02-27 NOTE — ASSESSMENT & PLAN NOTE
-related to dietary indiscretion of salt intake  -; baseline 113; BLE edema and JVD on exam  -on IV Lasix BID with 2 liters out so far; negative 1.9 liters since admission; feel additional volume removal needed therefore will continue IV Lasix BID  -on Norvasc, Hydralaizne and Metoprolol with decent afterload reduction; will continue  -continue with strict I&Os and daily weights; will re assess volume status in AM and determine if additional IV diuresis needed; reviewed importance of strict adherence to low salt diet-will consult dietian

## 2019-02-27 NOTE — CONSULTS
[facility]  Cardiology  Consult Note    Patient Name: Aleksandr Schultz  MRN: [id number]  Admission Date: 2/26/2019  Hospital Length of Stay: 1 days  Code Status: Full Code   Attending Provider: Robyn Pacheco*   Consulting Provider: ADRIAN Knott, SLADE  Primary Care Physician: Ascencion Cedeño MD  Principal Problem:<principal problem not specified>    Patient information was obtained from patient, relative(s) and past medical records.     Inpatient consult to Cardiology  Consult performed by: ADRIAN Rao, SLADE  Consult ordered by: Zuly Delgado PA-C  Reason for consult: HFpEF         Subjective:     Chief Complaint:  SOB and BLE edema      HPI:   89yo male with history of CAD s/p PCI, AAA, carotid stenosis, COPD, PAD, AI, HLP, DMII, PAF, HFpEF, MICHELLE and CKD stage III who presents to the ER with complaints of BLE swelling and SOB. Mr. Schultz is accompanied by his daughter who helps provide his history. She reports he is very independent and helps care for his wife on hospice with dementia. He continues to drive and went to a basketball game last week. His daughter noted him to be more SOB with LE swelling prompting him to present to the ER at [facility]. He had labs and was observed and was sent home with no new medications. He reports eating more salt than usual after attending a birthday party and eating a fried seafood platter. He was seen by Dr. Buckner on Monday with increase in his Lasix from daily to TID for volume removal. His daughter reports he did this Monday evening and Tuesday morning but she noted his swelling to be worsening and noted him to be SOB at rest prompting him to present to the ER.     Hospital Course:    2/26/2019 Presented to the ER with complaints of BLE swelling and SOB.  baseline 113. HR and BP stable. Troponin <.006. Admitted to [facility] with ADHF. Started on IV Lasix BID for volume removal   2/27/2019 Creatinine 1.4 K+  3.1. Remains on IV Lasix BID with 2.0 liters out so far and negative 1.9 liters since admission. SOB at rest resolves but remains with minimal exertion ie moving in bed. HR and BP stable.     Past Medical History:   Diagnosis Date    Aortic aneurysm     Asthma, chronic     Chronic kidney disease     CKD (chronic kidney disease)     COPD (chronic obstructive pulmonary disease)     Coronary artery disease     Diabetes mellitus     Glaucoma (increased eye pressure)     Hypertension     Peripheral vascular disease     Prostate cancer     Sleep apnea     cpap    Small bowel obstruction     Status post balloon angioplasty of pulmonary artery branches     1980    Stroke     1991       Past Surgical History:   Procedure Laterality Date    BACK SURGERY      x2 lumbar    CAROTID ARTERY ANGIOPLASTY      COLONOSCOPY N/A 3/25/2013    Performed by [facility] at [facility] (4TH FLR)    HERNIA REPAIR      PI OU      laser PI OU    SHOULDER SURGERY      rotator bilateral       Review of patient's allergies indicates:   Allergen Reactions    Iodinated contrast- oral and iv dye     Lisinopril Swelling       No current facility-administered medications on file prior to encounter.      Current Outpatient Medications on File Prior to Encounter   Medication Sig    albuterol (PROVENTIL) 2.5 mg /3 mL (0.083 %) nebulizer solution Take 2.5 mg by nebulization 4 (four) times daily as needed.     amiodarone (PACERONE) 200 MG Tab TAKE 1 TABLET ONE TIME DAILY. TAKE AT NIGHT PER DR. LIPSCOMB ON 6/23/17.    amLODIPine (NORVASC) 10 MG tablet TAKE 1 TABLET EVERY DAY    aspirin (ECOTRIN) 81 MG EC tablet Take 81 mg by mouth once daily.      atorvastatin (LIPITOR) 40 MG tablet Take 40 mg by mouth once daily.    cyclobenzaprine (FLEXERIL) 10 MG tablet Take 10 mg by mouth 3 (three) times daily as needed. Pt taking one pill at night.    ELIQUIS 2.5 mg Tab TAKE 1 TABLET TWICE DAILY    furosemide (LASIX) 40 MG tablet  TAKE 1 TABLET EVERY DAY    glimepiride (AMARYL) 4 MG tablet Take 4 mg by mouth 2 (two) times daily.     hydrALAZINE (APRESOLINE) 25 MG tablet Take 1 tablet (25 mg total) by mouth every 8 (eight) hours.    ipratropium (ATROVENT) 0.02 % nebulizer solution Take 500 mcg by nebulization 4 (four) times daily. PRN    loratadine 10 mg Cap Take 1 tablet by mouth once daily.     metoprolol tartrate (LOPRESSOR) 50 MG tablet Take 50 mg by mouth 2 (two) times daily.    mometasone (NASONEX) 50 mcg/actuation nasal spray 2 sprays by Nasal route daily as needed.     nitroGLYCERIN (NITROSTAT) 0.4 MG SL tablet Place 0.4 mg under the tongue every 5 (five) minutes as needed (PRN).     pioglitazone (ACTOS) 15 MG tablet Take 15 mg by mouth once daily.    potassium chloride (MICRO-K) 10 MEQ CpSR TAKE 1 CAPSULE EVERY DAY    ranitidine (ZANTAC) 150 MG tablet Take 150 mg by mouth 2 (two) times daily. Pt taking prn.    tamsulosin (FLOMAX) 0.4 mg Cp24 Take 0.8 mg by mouth every evening.     vitamin D 1000 units Tab Take 1,000 Units by mouth once daily.     Family History     Problem Relation (Age of Onset)    Heart attack Father    Heart disease Father        Tobacco Use    Smoking status: Former Smoker     Types: Cigarettes     Last attempt to quit: 10/11/1977     Years since quittin.4    Smokeless tobacco: Never Used   Substance and Sexual Activity    Alcohol use: No    Drug use: No    Sexual activity: Not Currently     Review of Systems   Constitution: Negative for chills, decreased appetite, diaphoresis, fever and weakness.   Cardiovascular: Positive for dyspnea on exertion, orthopnea and paroxysmal nocturnal dyspnea. Negative for chest pain, claudication, cyanosis, irregular heartbeat, leg swelling, near-syncope, palpitations and syncope.   Respiratory: Negative for cough, hemoptysis, shortness of breath and wheezing.    Gastrointestinal: Negative for bloating, abdominal pain, constipation, diarrhea, melena, nausea  and vomiting.   Neurological: Negative for dizziness.     Objective:     Vital Signs (Most Recent):  Temp: 98.9 °F (37.2 °C) (02/27/19 0601)  Pulse: 72 (02/27/19 1300)  Resp: (!) 26 (02/27/19 1300)  BP: (!) 148/73 (02/27/19 1300)  SpO2: (!) 93 % (02/27/19 1300) Vital Signs (24h Range):  Temp:  [98.3 °F (36.8 °C)-99.7 °F (37.6 °C)] 98.9 °F (37.2 °C)  Pulse:  [60-76] 72  Resp:  [17-36] 26  SpO2:  [92 %-97 %] 93 %  BP: (148-211)/(68-92) 148/73     Weight: 81.6 kg (180 lb)  Body mass index is 27.37 kg/m².    SpO2: (!) 93 %  O2 Device (Oxygen Therapy): room air      Intake/Output Summary (Last 24 hours) at 2/27/2019 1434  Last data filed at 2/27/2019 1347  Gross per 24 hour   Intake 370 ml   Output 2530 ml   Net -2160 ml       Lines/Drains/Airways     Peripheral Intravenous Line                 Peripheral IV - Single Lumen 02/26/19 1812 Left Forearm less than 1 day                Physical Exam   Constitutional: He is oriented to person, place, and time. He appears well-developed and well-nourished. No distress.   Neck: JVD present.   Cardiovascular: Normal rate and regular rhythm. Exam reveals no gallop.   No murmur heard.  Pulmonary/Chest: Effort normal. No respiratory distress. He has decreased breath sounds. He has no wheezes.   Abdominal: Soft. Bowel sounds are normal. He exhibits no distension. There is no tenderness.   Neurological: He is alert and oriented to person, place, and time.   Skin: Skin is warm and dry.       Significant Labs:     Recent Labs   Lab 02/27/19  1151   TROPONINI 0.020     Recent Labs   Lab 02/27/19  0640   WBC 9.99   RBC 3.75*   HGB 11.6*   HCT 34.7*      MCV 93   MCH 30.9   MCHC 33.4     Recent Labs   Lab 02/26/19  1811 02/27/19  0640   CALCIUM 8.5* 8.7   PROT 7.0  --    * 131*   K 3.6 3.1*   CO2 23 27   CL 92* 94*   BUN 20 19   CREATININE 1.3 1.4   ALKPHOS 123  --    *  --    AST 95*  --    BILITOT 1.3*  --        Significant Imaging: Echocardiogram:   Repeat TTE  2/27/2019 with pending results   2D echo with color flow doppler:   Results for orders placed or performed in visit on 04/20/17   2D echo with color flow doppler   Result Value Ref Range    QEF 60 55 - 65    Diastolic Dysfunction Yes (A)     Aortic Valve Regurgitation MILD TO MODERATE (A)     Aortic Valve Stenosis MILD (A)     Est. PA Systolic Pressure 35.95     Mitral Valve Mobility NORMAL     Tricuspid Valve Regurgitation MILD      Assessment and Plan:     Acute diastolic congestive heart failure    -related to dietary indiscretion of salt intake  -; baseline 113; BLE edema and JVD on exam  -on IV Lasix BID with 2 liters out so far; negative 1.9 liters since admission; feel additional volume removal needed therefore will continue IV Lasix BID  -on Norvasc, Hydralaizne and Metoprolol with decent afterload reduction; will continue  -continue with strict I&Os and daily weights; will re assess volume status in AM and determine if additional IV diuresis needed; reviewed importance of strict adherence to low salt diet-will consult dietian      CKD (chronic kidney disease) stage 3, GFR 30-59 ml/min    -creatinine 1.4 GFR 43  -baseline creatinine 1.1-1.8 GFR 37 to >60  -continue to monitor closely with aggressive diuresis      Hypertension associated with diabetes    -stable  -continue Norvasc, Hydralazine and Metoprolol  -monitor BP and adjust meds prn      Paroxysmal atrial fibrillation    -currently NSR; admit EKG NSR  -continue Amiodarone, BB and Eliquis          VTE Risk Mitigation (From admission, onward)        Ordered     apixaban tablet 2.5 mg  2 times daily      02/27/19 0603     IP VTE HIGH RISK PATIENT  Once      02/26/19 2116          Thank you for your consult. I will follow-up with patient. Please contact us if you have any additional questions.    ADRIAN Knott, ANP  Cardiology   [facility]

## 2019-02-27 NOTE — ED NOTES
Recd report. Assumed care of pt at this time. Pt moved to ED 16. Pt resting quietly on stretcher with eyes closed resp even unlabored. O22L NC in use. Pt on CM and continuous pulse ox. NAD at this time. Daughter at bedside.

## 2019-02-27 NOTE — HOSPITAL COURSE
2/26/2019 Presented to the ER with complaints of BLE swelling and SOB.  baseline 113. HR and BP stable. Troponin <.006. Admitted to [facility] with ADHF. Started on IV Lasix BID for volume removal   2/27/2019 Creatinine 1.4 K+ 3.1. Remains on IV Lasix BID with 2.0 liters out so far and negative 1.9 liters since admission. SOB at rest resolves but remains with minimal exertion ie moving in bed. HR and BP stable.   2/28/2019 Creatinine 1.4 K+ 3.0. On IV Lasix BID with 3.8 liters out overnight. Negative 3.7 liters since admission. LE edema improving. Continues to complain of SOB with exertion and JVD remains. Temp this AM of 101.3 and will continue with IV Lasix for continued volume removal   3/1/2019 2.7 liters out overnight. Negative 5.2 liters since discharge. Developed fever about 48 hours ago with management per primary team. Complains of cough and ill feeling. Reports SOB and LE edema improved since admission. K+ 3.4 creatinine 1.3 HR and BP stable. IV Lasix de escalated to oral Lasix per primary team

## 2019-02-28 PROBLEM — E03.9 ACQUIRED HYPOTHYROIDISM: Status: ACTIVE | Noted: 2019-02-28

## 2019-02-28 PROBLEM — E87.6 HYPOKALEMIA: Status: ACTIVE | Noted: 2019-02-28

## 2019-02-28 LAB
ANION GAP SERPL CALC-SCNC: 6 MMOL/L
BASOPHILS # BLD AUTO: 0.02 K/UL
BASOPHILS NFR BLD: 0.2 %
BUN SERPL-MCNC: 20 MG/DL
CALCIUM SERPL-MCNC: 8.5 MG/DL
CHLORIDE SERPL-SCNC: 96 MMOL/L
CO2 SERPL-SCNC: 31 MMOL/L
CREAT SERPL-MCNC: 1.4 MG/DL
DIFFERENTIAL METHOD: ABNORMAL
EOSINOPHIL # BLD AUTO: 0 K/UL
EOSINOPHIL NFR BLD: 0 %
ERYTHROCYTE [DISTWIDTH] IN BLOOD BY AUTOMATED COUNT: 14.2 %
EST. GFR  (AFRICAN AMERICAN): 50 ML/MIN/1.73 M^2
EST. GFR  (NON AFRICAN AMERICAN): 43 ML/MIN/1.73 M^2
GLUCOSE SERPL-MCNC: 160 MG/DL
HCT VFR BLD AUTO: 32.6 %
HGB BLD-MCNC: 10.9 G/DL
LYMPHOCYTES # BLD AUTO: 0.9 K/UL
LYMPHOCYTES NFR BLD: 7.9 %
MCH RBC QN AUTO: 31.2 PG
MCHC RBC AUTO-ENTMCNC: 33.4 G/DL
MCV RBC AUTO: 93 FL
MONOCYTES # BLD AUTO: 1.3 K/UL
MONOCYTES NFR BLD: 11.4 %
NEUTROPHILS # BLD AUTO: 8.8 K/UL
NEUTROPHILS NFR BLD: 79.9 %
PLATELET # BLD AUTO: 230 K/UL
PMV BLD AUTO: 9.4 FL
POCT GLUCOSE: 123 MG/DL (ref 70–110)
POCT GLUCOSE: 173 MG/DL (ref 70–110)
POCT GLUCOSE: 48 MG/DL (ref 70–110)
POCT GLUCOSE: 96 MG/DL (ref 70–110)
POCT GLUCOSE: 99 MG/DL (ref 70–110)
POTASSIUM SERPL-SCNC: 3 MMOL/L
RBC # BLD AUTO: 3.49 M/UL
SODIUM SERPL-SCNC: 133 MMOL/L
WBC # BLD AUTO: 10.96 K/UL

## 2019-02-28 PROCEDURE — 27000190 HC CPAP FULL FACE MASK W/VALVE

## 2019-02-28 PROCEDURE — 92610 EVALUATE SWALLOWING FUNCTION: CPT

## 2019-02-28 PROCEDURE — 99900035 HC TECH TIME PER 15 MIN (STAT)

## 2019-02-28 PROCEDURE — 94660 CPAP INITIATION&MGMT: CPT

## 2019-02-28 PROCEDURE — 36415 COLL VENOUS BLD VENIPUNCTURE: CPT

## 2019-02-28 PROCEDURE — 85025 COMPLETE CBC W/AUTO DIFF WBC: CPT

## 2019-02-28 PROCEDURE — 25000003 PHARM REV CODE 250: Performed by: NURSE PRACTITIONER

## 2019-02-28 PROCEDURE — 63600175 PHARM REV CODE 636 W HCPCS: Performed by: NURSE PRACTITIONER

## 2019-02-28 PROCEDURE — 94761 N-INVAS EAR/PLS OXIMETRY MLT: CPT

## 2019-02-28 PROCEDURE — S0030 INJECTION, METRONIDAZOLE: HCPCS | Performed by: FAMILY MEDICINE

## 2019-02-28 PROCEDURE — 25000003 PHARM REV CODE 250: Performed by: FAMILY MEDICINE

## 2019-02-28 PROCEDURE — 99232 SBSQ HOSP IP/OBS MODERATE 35: CPT | Mod: ,,, | Performed by: STUDENT IN AN ORGANIZED HEALTH CARE EDUCATION/TRAINING PROGRAM

## 2019-02-28 PROCEDURE — 99232 PR SUBSEQUENT HOSPITAL CARE,LEVL II: ICD-10-PCS | Mod: ,,, | Performed by: STUDENT IN AN ORGANIZED HEALTH CARE EDUCATION/TRAINING PROGRAM

## 2019-02-28 PROCEDURE — 63600175 PHARM REV CODE 636 W HCPCS: Performed by: FAMILY MEDICINE

## 2019-02-28 PROCEDURE — 80048 BASIC METABOLIC PNL TOTAL CA: CPT

## 2019-02-28 PROCEDURE — 21400001 HC TELEMETRY ROOM

## 2019-02-28 PROCEDURE — 97535 SELF CARE MNGMENT TRAINING: CPT

## 2019-02-28 PROCEDURE — 25000003 PHARM REV CODE 250: Performed by: HOSPITALIST

## 2019-02-28 RX ORDER — POTASSIUM CHLORIDE 20 MEQ/15ML
40 SOLUTION ORAL DAILY
Status: DISCONTINUED | OUTPATIENT
Start: 2019-03-01 | End: 2019-03-01

## 2019-02-28 RX ORDER — LEVOTHYROXINE SODIUM 50 UG/1
50 TABLET ORAL
Status: DISCONTINUED | OUTPATIENT
Start: 2019-03-01 | End: 2019-03-02 | Stop reason: HOSPADM

## 2019-02-28 RX ORDER — DOCUSATE SODIUM 100 MG/1
100 CAPSULE, LIQUID FILLED ORAL 2 TIMES DAILY
Status: DISCONTINUED | OUTPATIENT
Start: 2019-02-28 | End: 2019-03-02 | Stop reason: HOSPADM

## 2019-02-28 RX ADMIN — METOPROLOL TARTRATE 50 MG: 50 TABLET ORAL at 08:02

## 2019-02-28 RX ADMIN — FAMOTIDINE 20 MG: 20 TABLET ORAL at 09:02

## 2019-02-28 RX ADMIN — HYDRALAZINE HYDROCHLORIDE 25 MG: 25 TABLET, FILM COATED ORAL at 06:02

## 2019-02-28 RX ADMIN — APIXABAN 2.5 MG: 2.5 TABLET, FILM COATED ORAL at 08:02

## 2019-02-28 RX ADMIN — CEFTRIAXONE 1 G: 1 INJECTION, SOLUTION INTRAVENOUS at 05:02

## 2019-02-28 RX ADMIN — HYDRALAZINE HYDROCHLORIDE 25 MG: 25 TABLET, FILM COATED ORAL at 03:02

## 2019-02-28 RX ADMIN — ASPIRIN 81 MG: 81 TABLET, COATED ORAL at 08:02

## 2019-02-28 RX ADMIN — FUROSEMIDE 40 MG: 10 INJECTION, SOLUTION INTRAVENOUS at 06:02

## 2019-02-28 RX ADMIN — FUROSEMIDE 40 MG: 10 INJECTION, SOLUTION INTRAVENOUS at 08:02

## 2019-02-28 RX ADMIN — HYDRALAZINE HYDROCHLORIDE 25 MG: 25 TABLET, FILM COATED ORAL at 11:02

## 2019-02-28 RX ADMIN — TAMSULOSIN HYDROCHLORIDE 0.8 MG: 0.4 CAPSULE ORAL at 09:02

## 2019-02-28 RX ADMIN — METOPROLOL TARTRATE 50 MG: 50 TABLET ORAL at 09:02

## 2019-02-28 RX ADMIN — APIXABAN 2.5 MG: 2.5 TABLET, FILM COATED ORAL at 09:02

## 2019-02-28 RX ADMIN — POTASSIUM CHLORIDE 20 MEQ: 20 SOLUTION ORAL at 08:02

## 2019-02-28 RX ADMIN — AMLODIPINE BESYLATE 10 MG: 5 TABLET ORAL at 08:02

## 2019-02-28 RX ADMIN — ACETAMINOPHEN 650 MG: 650 SUPPOSITORY RECTAL at 05:02

## 2019-02-28 RX ADMIN — ATORVASTATIN CALCIUM 40 MG: 40 TABLET, FILM COATED ORAL at 08:02

## 2019-02-28 RX ADMIN — ACETAMINOPHEN 650 MG: 650 SUPPOSITORY RECTAL at 09:02

## 2019-02-28 RX ADMIN — CETIRIZINE HYDROCHLORIDE 10 MG: 10 TABLET, FILM COATED ORAL at 08:02

## 2019-02-28 RX ADMIN — FAMOTIDINE 20 MG: 20 TABLET ORAL at 08:02

## 2019-02-28 RX ADMIN — METRONIDAZOLE 500 MG: 500 INJECTION, SOLUTION INTRAVENOUS at 10:02

## 2019-02-28 RX ADMIN — DOCUSATE SODIUM 100 MG: 100 CAPSULE, LIQUID FILLED ORAL at 09:02

## 2019-02-28 RX ADMIN — METRONIDAZOLE 500 MG: 500 INJECTION, SOLUTION INTRAVENOUS at 03:02

## 2019-02-28 RX ADMIN — METRONIDAZOLE 500 MG: 500 INJECTION, SOLUTION INTRAVENOUS at 06:02

## 2019-02-28 RX ADMIN — ERYTHROMYCIN 1 INCH: 5 OINTMENT OPHTHALMIC at 09:02

## 2019-02-28 RX ADMIN — DEXTROSE MONOHYDRATE 25 G: 25 INJECTION, SOLUTION INTRAVENOUS at 06:02

## 2019-02-28 RX ADMIN — AMIODARONE HYDROCHLORIDE 200 MG: 200 TABLET ORAL at 09:02

## 2019-02-28 NOTE — CONSULTS
Education:   RD Consulted for education on low sodium diet.     Diet Education: Low Sodium, Consistent Carbohydrate   Time Spent: 15 minutes     Learners: Pt and Daughter  Nutrition Education Provided with Handouts: Heart Healthy Consistent Carbohydrate     Summary:   Pt was hard of hearing and drowsy. Daughter was in the room and was willing to listen to the education on his behalf. Daughter states her and her siblings often provide food for the pt. Pt cooks for himself occasionally at home. Spoke to pt's daughter about sodium restrictions, 140 mg per serving of food/beverage. Approximately 1 teaspoon of salt all day (2000 mg). Discussed salt-free seasoning blends and how to add flavor to foods with garlic, onions, and fresh herbs. Educated pt's daughter on how to read food labels of pre-packaged foods. Discussed watching simple CHO intake, as well, to prevent blood sugar spikes. Pt's daughter displayed interest and understanding. Pt seemed nonchalant about the information.     All Questions and Concerns Answered.   Dietitians contact information provided.   Thanks!   Nallely Lee Dietetic Intern --     I certify that I directed the dietetic intern in service delivery and guided them using my skilled judgment. As the cosigning dietitian, I have reviewed the dietetic interns documentation and am responsible for the treatment, assessment, and plan. Tracie Weil Cavalier, NAYELYN, LDN, [facility]    Ruth Garcia

## 2019-02-28 NOTE — SUBJECTIVE & OBJECTIVE
Review of Systems   Constitution: Negative for chills, decreased appetite, diaphoresis, fever and weakness.   Cardiovascular: Positive for dyspnea on exertion. Negative for chest pain, claudication, cyanosis, irregular heartbeat, leg swelling, near-syncope, orthopnea, palpitations, paroxysmal nocturnal dyspnea and syncope.   Respiratory: Negative for cough, hemoptysis, shortness of breath and wheezing.    Gastrointestinal: Negative for bloating, abdominal pain, constipation, diarrhea, melena, nausea and vomiting.   Neurological: Negative for dizziness.     Objective:     Vital Signs (Most Recent):  Temp: 97.1 °F (36.2 °C) (02/28/19 1224)  Pulse: 60 (02/28/19 1224)  Resp: 18 (02/28/19 1224)  BP: (!) 146/67 (02/28/19 1224)  SpO2: 95 % (02/28/19 1224) Vital Signs (24h Range):  Temp:  [97.1 °F (36.2 °C)-103.3 °F (39.6 °C)] 97.1 °F (36.2 °C)  Pulse:  [60-71] 60  Resp:  [14-33] 18  SpO2:  [93 %-96 %] 95 %  BP: (130-164)/(61-73) 146/67     Weight: 83.8 kg (184 lb 11.9 oz)  Body mass index is 28.09 kg/m².     SpO2: 95 %  O2 Device (Oxygen Therapy): room air      Intake/Output Summary (Last 24 hours) at 2/28/2019 1407  Last data filed at 2/28/2019 1000  Gross per 24 hour   Intake 580 ml   Output 2000 ml   Net -1420 ml       Lines/Drains/Airways     Drain            Male External Urinary Catheter 02/27/19 1500 less than 1 day          Peripheral Intravenous Line                 Peripheral IV - Single Lumen 02/26/19 1812 Left Forearm 1 day                Physical Exam   Constitutional: He is oriented to person, place, and time. He appears well-developed and well-nourished. No distress.   Neck: JVD present.   Cardiovascular: Normal rate and regular rhythm. Exam reveals no gallop.   No murmur heard.  Pulmonary/Chest: Effort normal and breath sounds normal. No respiratory distress. He has no wheezes.   Abdominal: Soft. Bowel sounds are normal. He exhibits no distension. There is no tenderness.   Musculoskeletal: He exhibits  edema.   Neurological: He is alert and oriented to person, place, and time.   Skin: Skin is warm and dry.       Significant Labs:     Recent Labs   Lab 02/28/19  0734   CALCIUM 8.5*   *   K 3.0*   CO2 31*   CL 96   BUN 20   CREATININE 1.4     Recent Labs   Lab 02/28/19  0734   WBC 10.96   RBC 3.49*   HGB 10.9*   HCT 32.6*      MCV 93   MCH 31.2*   MCHC 33.4         Significant Imaging:   TTE 2/27/2019  · Normal left ventricular systolic function. The estimated ejection fraction is 55%  · Grade I (mild) left ventricular diastolic dysfunction consistent with impaired relaxation.  · Normal right ventricular systolic function.  · Normal left atrial pressure.  · Mild aortic regurgitation.  · The estimated PA systolic pressure is 33 mm Hg  · Intermediate central venous pressure (8 mm Hg).

## 2019-02-28 NOTE — PT/OT/SLP EVAL
"Speech Language Pathology Evaluation  Bedside Swallow    Patient Name:  Aleksandr Schultz   MRN:  [id number]  Admitting Diagnosis: Aspiration pneumonia    Recommendations:                 General Recommendations:  Follow-up not indicated  Diet recommendations:  Regular, Thin   Aspiration Precautions: 1 bite/sip at a time, Alternating bites/sips, Double swallow with each bite/sip, Eliminate distractions, Feed only when awake/alert, HOB to 90 degrees, Meds whole 1 at a time, Remain upright 30 minutes post meal, Small bites/sips and Standard aspiration precautions   General Precautions: Standard, fall, contact  Communication strategies:  hearing aids and [place]    History:   Chief Complaint:        Chief Complaint   Patient presents with    Shortness of Breath       Was seen at clinic and was told he is having CHF exacerbation. Pitting edema to BLE. Pt's daughter reports confusion and more fatigued.          HPI: Aleksandr Schultz (Jimmy) is a 89 yo male with HTN, HLD, CAD, chronic diastolic  Dysfunction, bilateral carotid artery disease, CVA, AAA, PAD, paroxysmal atrial fibrillation, type 2 diabetes mellitus, nonrheumatic aortic valve stenosis, sinus noemy-tachy syndrome and obstructive sleep apnea. Per daughter at bedside patient  AAOx3 at baseline, very active and still drives. He lives in [place], [place]. His primary care physician is Dr. Ascencion Cedeño. His cardiologist is Dr. Lynne Buckner.  He presented to [facility] 2/26/19 with complaint of worsening SOB/GONZALEZ and bilateral lower extremity edema over the past 3 days. He attended a birthday party on Saturday, 2/23 . He ate salty food. On Sunday, 2/24, he felt weak and short of breath.His daughter took him to ER where he was checked, but except for O2 sats of 91-93% they found no abnormalities.  Medications were not changed at the time. The patient followed up with his cardiologist on 2/25. His lasix was increased to 40 mg BID at the time. Per daughter at bedside patient " with ongoing SOB, worse than baseline, lethargy and lower extremity  edema despite increased po lasix. He has also had decreased urinary output, non-productive cough and very little sleep due to SOB when lying down.   Labs in ED remarkable for WBC (12.80), na (128), AST (95), ALT (100), BNP (416). Troponin negative. CXR shows course asymmetric opacities seen within the left lower lung zone.  This may represent chronic change or atelectasis although difficult to exclude potential aspiration or developing pneumonia in the right clinical setting.  No focal consolidation seen. Low grade temp (99.8) on presentation, BP stable. Patient received 80 mg IV lasix in ED. Patient admitted to [facility] for further care.     Past Medical History:   Diagnosis Date    Aortic aneurysm     Asthma, chronic     Chronic kidney disease     CKD (chronic kidney disease)     COPD (chronic obstructive pulmonary disease)     Coronary artery disease     Diabetes mellitus     Glaucoma (increased eye pressure)     Hypertension     Peripheral vascular disease     Prostate cancer     Sleep apnea     cpap    Small bowel obstruction     Status post balloon angioplasty of pulmonary artery branches     1980    Stroke     1991       Past Surgical History:   Procedure Laterality Date    BACK SURGERY      x2 lumbar    CAROTID ARTERY ANGIOPLASTY      COLONOSCOPY N/A 3/25/2013    Performed by Wiliam Kim [facility] at [facility] (4TH FLR)    HERNIA REPAIR      PI OU      laser PI OU    SHOULDER SURGERY      rotator bilateral       Social History: Patient lives with wife at home. He is the primary caretaker for his wife. Daughter present during eval.     Prior Intubation HX:  n/a    Modified Barium Swallow: none per admit    Chest X-Rays: Coarse opacities are seen within the left lower lung zone.  This could reflect chronic change although difficult to exclude aspiration or developing pneumonia in the right clinical  setting.  No evidence of confluent focal consolidation.  No evidence of pneumothorax or large effusion.     Prior diet: daughter reports regular solids at home.     Subjective     Consult received for clinical swallow eval this date, SLP did communicate with RN prior to eval/treat.    Patient goals: none stated, daughter reports if he liked the food, he may eat better.     Pain/Comfort:  · Pain Rating 1: 0/10    Objective:   Pt seen at bedside in room, daughter present. She denies any trouble swallowing. She reports dcr'd intake when entering hospital. Pt also with fever at this time and consuming little PO. She said he is drinking water when coaxed.   Pt oriented to self, daughter and place. He is [place] and requires some repetition of information.   Pt was pleasant and did agree to PO trials with SLP.   Daughter did inquire about ordering some boost with meals to increase caloric needs.      Oral Musculature Evaluation  · Oral Musculature: WFL  · Dentition: upper dentures, lower dentures  · Secretion Management: (wife reports he will spit up secretions at times, thick and yellow. No spitting up during ST eval. )  · Mucosal Quality: good, adequate  · Oral Labial Strength and Mobility: (fair)  · Lingual Strength and Mobility: (fair)  · Buccal Strength and Mobility: (fair buccal hold)  · Volitional Cough: elicited  · Volitional Swallow: slight delay, multiple swallows present  · Voice Prior to PO Intake: low volume, clear voice, no wet voice present    Bedside Swallow Eval:   Consistencies Assessed:  · Thin liquids water by cup/straw  · Puree applesauce by spoon  · Solids cracker x4 (1/4 bites)     Oral Phase:   · WFL    Pharyngeal Phase:   · no overt clinical signs/symptoms of aspiration  · no overt clinical signs/symptoms of pharyngeal dysphagia    Compensatory Strategies  · Alternate sips and bites  · Liquid rinse after solids     Treatment: Skilled education including written precautions provided to pt and family on  aspiration precautions and diet recommendations.  Nursing made aware of diet recommendations this date. Discussed and educated aspiration s/s with daughter who verbalized understanding.  No further questions from pt or daughter s/p session at this time.      Assessment:     Aleksandr Schultz is a 89 y.o. male admitted with Aspiration pneumonia and presents with no clinical s/s of dysphagia per bedside observations.     Goals:   Multidisciplinary Problems     SLP Goals     Not on file          Multidisciplinary Problems (Resolved)        Problem: SLP Goal    Goal Priority Disciplines Outcome   SLP Goal   (Resolved)     SLP Outcome(s) achieved                 Plan:     · Patient to be seen:      · Plan of Care expires:     · Plan of Care reviewed with:  patient, spouse   · SLP Follow-Up:  No       Discharge recommendations:  (TBD)     Time Tracking:     SLP Treatment Date:   02/28/19  Speech Start Time:  1100  Speech Stop Time:  1125     Speech Total Time (min):  25 min    Billable Minutes: Eval Swallow and Oral Function 11 and Seld Care/Home Management Training 14    ELEN Dorsey, CCC-SLP  02/28/2019

## 2019-02-28 NOTE — PLAN OF CARE
Problem: Adult Inpatient Plan of Care  Goal: Plan of Care Review  Outcome: Ongoing (interventions implemented as appropriate)  1918H Patient is awake and orientedx4. Care plan explained and verbalized understanding. Grandson at bedside. VIP care model explained. Both eyes with yellowish drainage, positive for redness. On oxygen 2lpm/nasal cannula, O2 saturation 94%. On heart monitor running Sinus Rhythm with First Degree AV Block at 60s. IV site to left forearm 20G, metronidazole infusing. External urinary catheter (condom catheter) in placed; draining clear yellow urine. Compression stockings on. Non-skid socks on. Maintained on low salt, low fat diet. No complaints of pain or shortness of breath. Able to turn himself in bed. Maintained on fall precaution. Bed in lowest position, bed alarms on, call light within reach and instructed to call for help when needed. Grandson remain at bedside. Will continue  to monitor.

## 2019-02-28 NOTE — ASSESSMENT & PLAN NOTE
-related to dietary indiscretion of salt intake  -; baseline 113; BLE edema and JVD remain on exam  -on IV Lasix BID with 3.8 liters out overnight; negative 3.7 liters since admission; needs continued diuresis for additional volume removal   -continue Norvasc, Hydralaizne and Metoprolol with decent afterload reduction  -continue with strict I&Os and daily weights; dietian consulted to low salt diet education/reinforcement  l

## 2019-02-28 NOTE — PLAN OF CARE
VN cued into pt's room for introduction with pt's permission. Pt is very [place] and has L ear hearing aid. Nephew Dj at bedside. Informed pt/nephew that VN would be working closely along side floor nurse, doctors, and the rest of the care team. Fall risk and bed alarm protocol education provided. Instructed pt/nephew to call for assistance and they are aware and agreeable. Allowed time for questions. Admission questions completed. NAD noted. Will cont to be available as needed.

## 2019-02-28 NOTE — PLAN OF CARE
"   02/28/19 1117   Discharge Assessment   Assessment Type Discharge Planning Assessment   Confirmed/corrected address and phone number on facesheet? Yes   Assessment information obtained from? Caregiver  (José Miguel Pietro(dtr)[phone])   Prior to hospitilization cognitive status: Alert/Oriented   Prior to hospitalization functional status: Assistive Equipment;Needs Assistance   Current cognitive status: Alert/Oriented   Current Functional Status: Assistive Equipment;Needs Assistance   Facility Arrived From: home   Lives With spouse   Is patient able to care for self after discharge? Yes   Who are your caregiver(s) and their phone number(s)? José Miguel Pietro(dtr)[phone]   Patient's perception of discharge disposition home health   If yes, most recent facility name: Pt was at [facility] in [place], [place].    Patient currently being followed by outpatient case management? No   Patient currently receives any other outside agency services? No   Equipment Currently Used at Home cane, quad;rollator;wheelchair;nebulizer;CPAP;bedside commode   Do you have any problems affording any of your prescribed medications? No   Is the patient taking medications as prescribed? yes   Does the patient have transportation home? Yes   Transportation Anticipated family or friend will provide   Does the patient receive services at the [facility]? No   Discharge Plan A Home with family   Discharge Plan B Home Health   DME Needed Upon Discharge  (TBD)   Patient/Family in Agreement with Plan yes      Shortness of Breath       Was seen at clinic and was told he is having CHF exacerbation. Pitting edema to BLE. Pt's daughter reports confusion and more fatigued.          HPI: Aleksandr Schultz (Jimmy) is a 89 yo male with HTN, HLD, CAD, chronic diastolic  Dysfunction, bilateral carotid artery disease, CVA, AAA, PAD, paroxysmal atrial fibrillation, type 2 diabetes mellitus, nonrheumatic aortic valve stenosis, sinus noemy-tachy syndrome and " obstructive sleep apnea. Per daughter at bedside patient  AAOx3 at baseline, very active and still drives. He lives in [place], [place]. His primary care physician is Dr. Ascencion Cedeño. His cardiologist is Dr. Lynne Buckner.  He presented to [facility] 2/26/19 with complaint of worsening SOB/GONZALEZ and bilateral lower extremity edema over the past 3 days. He attended a birthday party on Saturday, 2/23 . He ate salty food. On Sunday, 2/24, he felt weak and short of breath.His daughter took him to ER where he was checked, but except for O2 sats of 91-93% they found no abnormalities.  Medications were not changed at the time. The patient followed up with his cardiologist on 2/25. His lasix was increased to 40 mg BID at the time. Per daughter at bedside patient with ongoing SOB, worse than baseline, lethargy and lower extremity  edema despite increased po lasix. He has also had decreased urinary output, non-productive cough and very little sleep due to SOB when lying down.   Labs in ED remarkable for WBC (12.80), na (128), AST (95), ALT (100), BNP (416). Troponin negative. CXR shows course asymmetric opacities seen within the left lower lung zone.  This may represent chronic change or atelectasis although difficult to exclude potential aspiration or developing pneumonia in the right clinical setting.  No focal consolidation seen. Low grade temp (99.8) on presentation, BP stable. Patient received 80 mg IV lasix in ED. Patient admitted to [facility] for further care.      The  met with the pt,his dtr José Miguel and his grandson in the pt's room to complete the assessment. The pt was at [facility] in [place], [place] but he was discharged. She took the pt to his Cardiologist(Dr. Buckner)who informed her if the pt doesn't get better,bring the pt to an [facility]. The pt didn't get any better so José Miguel bought the pt to [facility] via private vehicle. The pt and his wife(who's currently on home  hospice)live alone and have sitters but José Miguel lives next door and assists a lot. She transports the pt to his dr's appointments. The pt has transportation home at d/c. She states the pt was driving last week and this change is something new. The pt had been very self sufficient,a bit slow but still getting around. The pt did his adl's and she assisted as needed. The pt does use dme as needed. José Miguel states they have a lot of dme at home for both parents and some they don't use but they still have the items. The Sw gave José Miguel a discharge brochure and wrote her contact info on the white board in the pt's room. The Sw answered all her questions appropriately and encouraged her to call should she have any further questions or concerns.

## 2019-02-28 NOTE — PLAN OF CARE
02/28/19 1114   Medicare Message   Important Message from Medicare regarding Discharge Appeal Rights Given to patient/caregiver;Explained to patient/caregiver;Signed/date by patient/caregiver   Date IMM was signed 02/28/19   Time IMM was signed 111

## 2019-02-28 NOTE — PROGRESS NOTES
"[facility] Hospital Medicine  Progress Note    Patient Name: Aleksandr Schultz  MRN: [id number]  Patient Class: IP- Inpatient   Admission Date: 2/26/2019  Length of Stay: 2 days  Attending Physician: Robyn Pacheco*  Primary Care Provider: Ascencion Cedeño MD        Subjective:     Principal Problem:Aspiration pneumonia    HPI:  Aleksandr Schultz (Jimmy) is a 89 yo male with HTN, HLD, CAD, chronic diastolic  Dysfunction, bilateral carotid artery disease, CVA, AAA, PAD, paroxysmal atrial fibrillation, type 2 diabetes mellitus, nonrheumatic aortic valve stenosis, sinus noemy-tachy syndrome and obstructive sleep apnea. Per daughter at bedside patient  AAOx3 at baseline, very active and still drives. He lives in [place], [place]. His primary care physician is Dr. Ascencion Cedeño. His cardiologist is Dr. Lynne Buckner.  He presented to [facility] 2/26/19 with complaint of worsening SOB/GONZALEZ and bilateral lower extremity edema over the past 3 days. He attended a birthday party on Saturday, 2/23 . He ate salty food. On Sunday, 2/24, he felt weak and short of breath.His daughter took him to ER where he was checked, but except for O2 sats of 91-93% they found no abnormalities.  Medications were not changed at the time. The patient followed up with his cardiologist on 2/25. His lasix was increased to 40 mg BID at the time. Per daughter at bedside patient with ongoing SOB, worse than baseline, lethargy and lower extremity  edema despite increased po lasix. He has also had decreased urinary output, non-productive cough and very little sleep due to SOB when lying down.   Labs in ED remarkable for WBC (12.80), na (128), AST (95), ALT (100), BNP (416). Troponin negative. CXR shows course asymmetric opacities seen within the left lower lung zone.  This may represent chronic change or atelectasis although difficult to exclude potential aspiration or developing pneumonia in the right clinical setting.  No focal " consolidation seen. Low grade temp (99.8) on presentation, BP stable. Patient received 80 mg IV lasix in ED. Patient admitted to [facility] for further care.       Hospital Course:  CXR reports possible aspiration Pneumonia. Start Rocephin and Flagyl       2/28 TSH has been chronically low since 2017 with no meds on record. Start synthroid. Repeat CXR in AM, PT/OT    Interval History: low grade fever,   Wbc improving, Blood cx NGTD  Started on Rocephin/ Azithromycin repeat CXR in AM  monitor K  Consider PT/OT      Review of Systems   Constitutional: Negative for chills and fever.   Respiratory: Positive for cough and shortness of breath. Negative for chest tightness and wheezing.    Cardiovascular: Positive for leg swelling. Negative for chest pain and palpitations.   Gastrointestinal: Negative for abdominal pain, diarrhea, nausea and vomiting.   Genitourinary: Negative for dysuria, flank pain and hematuria.   Musculoskeletal: Negative for back pain, myalgias and neck pain.   Skin: Negative for rash and wound.   Neurological: Positive for weakness. Negative for dizziness, syncope and headaches.   Psychiatric/Behavioral: Negative for agitation.     Objective:     Vital Signs (Most Recent):  Temp: 99.5 °F (37.5 °C) (02/28/19 0335)  Pulse: 65 (02/28/19 0400)  Resp: 14 (02/28/19 0335)  BP: (!) 156/69 (02/28/19 0335)  SpO2: (!) 93 % (02/28/19 0428) Vital Signs (24h Range):  Temp:  [97.6 °F (36.4 °C)-103.3 °F (39.6 °C)] 99.5 °F (37.5 °C)  Pulse:  [62-76] 65  Resp:  [14-33] 14  SpO2:  [92 %-96 %] 93 %  BP: (130-211)/(61-92) 156/69     Weight: 83.8 kg (184 lb 11.9 oz)  Body mass index is 28.09 kg/m².    Intake/Output Summary (Last 24 hours) at 2/28/2019 0708  Last data filed at 2/28/2019 0335  Gross per 24 hour   Intake 770 ml   Output 3875 ml   Net -3105 ml      Physical Exam   Constitutional: He is oriented to person, place, and time. He appears well-developed and well-nourished. No distress.   HENT:    Head: Normocephalic and atraumatic.   Eyes: Conjunctivae are normal. Pupils are equal, round, and reactive to light.   Neck: Normal range of motion. Neck supple. No JVD present.   Cardiovascular: Normal rate, regular rhythm and intact distal pulses.   Murmur heard.  Pulmonary/Chest: Effort normal and breath sounds normal. No respiratory distress. He has no wheezes.   Abdominal: Soft. Bowel sounds are normal. He exhibits distension. There is no tenderness. There is no guarding.   Musculoskeletal: Normal range of motion. He exhibits edema (1+ bilateral lower extremities ). He exhibits no tenderness.   Neurological: He is alert and oriented to person, place, and time.   Skin: Skin is warm and dry. Capillary refill takes less than 2 seconds. No erythema.   Psychiatric: He has a normal mood and affect. His behavior is normal.       Significant Labs:   Blood Culture:   Recent Labs   Lab 02/27/19  1645   LABBLOO No Growth to date     CBC:   Recent Labs   Lab 02/26/19  1811 02/27/19  0640   WBC 12.80* 9.99   HGB 11.7* 11.6*   HCT 34.7* 34.7*    242     CMP:   Recent Labs   Lab 02/26/19  1811 02/27/19  0640   * 131*   K 3.6 3.1*   CL 92* 94*   CO2 23 27   GLU 97 139*   BUN 20 19   CREATININE 1.3 1.4   CALCIUM 8.5* 8.7   PROT 7.0  --    ALBUMIN 3.5  --    BILITOT 1.3*  --    ALKPHOS 123  --    AST 95*  --    *  --    ANIONGAP 13 10   EGFRNONAA 47* 43*     Lactic Acid:   Recent Labs   Lab 02/27/19  1648   LACTATE 0.9     Magnesium: No results for input(s): MG in the last 48 hours.  Prealbumin: No results for input(s): PREALBUMIN in the last 48 hours.  TSH:   Recent Labs   Lab 02/22/19  0932   TSH 4.356*       Significant Imaging: none    Assessment/Plan:      * Aspiration pneumonia    CXR reports possible aspiration  Rocephin and Flagyl  SLP       Acquired hypothyroidism    Chronic with no meds on home meds  Start Synthroid and monitor       Hypokalemia    replace     Acute conjunctivitis of both eyes     Bilateral eye discharge  erythromycin ointment       Acute diastolic congestive heart failure    -continue lasix 40 mg BID   -strict intake/output and daily weights   -cardiac  Diet   -echo  -cardiology consulted per ED  -monitor tele        CKD (chronic kidney disease) stage 3, GFR 30-59 ml/min    BUN/CR 20/1.3   Monitor renal function   Avoid nephrotoxic meds        Hypertension associated with diabetes    Hyperlipidemia   PAD    -188   Continue amlodipine, metoprolol, ASA/statin      MICHELLE on CPAP    CPAP per home settings        Cerebrovascular accident (CVA)    Continue asa/statin        Paroxysmal atrial fibrillation    Continue  Amiodarone and eliquis  Monitor tele        Type 2 diabetes, controlled, with neuropathy    A1C 6.8   Hold po medications   SSI   Low dose SSI   Accucheck AC&HS        Nonrheumatic aortic valve stenosis    Chronic        COPD (chronic obstructive pulmonary disease)    Chronic   Continue supplemental O2, wean as tolerated   Pt not on home O2        Bilateral carotid artery disease    Chronic   Taking ASA/statin        AAA (abdominal aortic aneurysm)    Chronic         VTE Risk Mitigation (From admission, onward)        Ordered     apixaban tablet 2.5 mg  2 times daily      02/27/19 0603     IP VTE HIGH RISK PATIENT  Once      02/26/19 7400              Robyn Pacheco MD  Department of Hospital Medicine   [facility]

## 2019-02-28 NOTE — PLAN OF CARE
Pt has a temp of 101.3. Advised Dr. Chavarria that pt has prn tylenol suppository order but no parameter of temp to give. Give prn tylenol per Dr. Chavarria. Will continue to monitor.

## 2019-02-28 NOTE — PHYSICIAN QUERY
PT Name: Aleksandr Schultz  MR #: [id number]     Physician Query Form - Documentation Clarification      CDS/: Jenni Vizcaino               Contact information: [email or web address]      This form is a permanent document in the medical record.     Query Date: February 28, 2019    By submitting this query, we are merely seeking further clarification of documentation. Please utilize your independent clinical judgment when addressing the question(s) below.    The Medical record reflects the following:    Supporting Clinical Findings Location in Medical Record     Hypertension associated with diabetes    Hyperlipidemia   PAD     -188   Continue amlodipine, metoprolol, ASA/statin     Hypertension associated with diabetes    -stable  -continue Norvasc, Hydralazine and Metoprolol  -monitor BP and adjust meds prn       Acute diastolic congestive heart failure    CKD (chronic kidney disease) stage 3, GFR 30-59 ml/min       H&P            Cardiology consult 2/27             H&P Cards note                                                                            Doctor, Please specify diagnosis or diagnoses associated with above clinical findings.    Please clarify the relationship of the Hypertension below    Provider Use Only      [     ] Hypertension due to Diabetes Mellitus   [     ] Hypertension due to the Chronic kidney disease   [     ] Hypertension due to Chronic kidney disease and Heart failure   [     ] Other:__________________________________                                                                                                                 [ x ] Clinically Undetermined

## 2019-02-28 NOTE — PLAN OF CARE
Problem: Adult Inpatient Plan of Care  Goal: Plan of Care Review  Outcome: Ongoing (interventions implemented as appropriate)  Pt on documented O2, no respiratory distress noted. Pt refusing CPAP; RN notified. Will continue to monitor.

## 2019-02-28 NOTE — SUBJECTIVE & OBJECTIVE
Interval History: low grade fever,   Wbc improving, Blood cx NGTD  Started on Rocephin/ Azithromycin repeat CXR in AM  monitor K  Consider PT/OT      Review of Systems   Constitutional: Negative for chills and fever.   Respiratory: Positive for cough and shortness of breath. Negative for chest tightness and wheezing.    Cardiovascular: Positive for leg swelling. Negative for chest pain and palpitations.   Gastrointestinal: Negative for abdominal pain, diarrhea, nausea and vomiting.   Genitourinary: Negative for dysuria, flank pain and hematuria.   Musculoskeletal: Negative for back pain, myalgias and neck pain.   Skin: Negative for rash and wound.   Neurological: Positive for weakness. Negative for dizziness, syncope and headaches.   Psychiatric/Behavioral: Negative for agitation.     Objective:     Vital Signs (Most Recent):  Temp: 99.5 °F (37.5 °C) (02/28/19 0335)  Pulse: 65 (02/28/19 0400)  Resp: 14 (02/28/19 0335)  BP: (!) 156/69 (02/28/19 0335)  SpO2: (!) 93 % (02/28/19 0428) Vital Signs (24h Range):  Temp:  [97.6 °F (36.4 °C)-103.3 °F (39.6 °C)] 99.5 °F (37.5 °C)  Pulse:  [62-76] 65  Resp:  [14-33] 14  SpO2:  [92 %-96 %] 93 %  BP: (130-211)/(61-92) 156/69     Weight: 83.8 kg (184 lb 11.9 oz)  Body mass index is 28.09 kg/m².    Intake/Output Summary (Last 24 hours) at 2/28/2019 0708  Last data filed at 2/28/2019 0335  Gross per 24 hour   Intake 770 ml   Output 3875 ml   Net -3105 ml      Physical Exam   Constitutional: He is oriented to person, place, and time. He appears well-developed and well-nourished. No distress.   HENT:   Head: Normocephalic and atraumatic.   Eyes: Conjunctivae are normal. Pupils are equal, round, and reactive to light.   Neck: Normal range of motion. Neck supple. No JVD present.   Cardiovascular: Normal rate, regular rhythm and intact distal pulses.   Murmur heard.  Pulmonary/Chest: Effort normal and breath sounds normal. No respiratory distress. He has no wheezes.   Abdominal: Soft.  Bowel sounds are normal. He exhibits distension. There is no tenderness. There is no guarding.   Musculoskeletal: Normal range of motion. He exhibits edema (1+ bilateral lower extremities ). He exhibits no tenderness.   Neurological: He is alert and oriented to person, place, and time.   Skin: Skin is warm and dry. Capillary refill takes less than 2 seconds. No erythema.   Psychiatric: He has a normal mood and affect. His behavior is normal.       Significant Labs:   Blood Culture:   Recent Labs   Lab 02/27/19  1645   LABBLOO No Growth to date     CBC:   Recent Labs   Lab 02/26/19 1811 02/27/19  0640   WBC 12.80* 9.99   HGB 11.7* 11.6*   HCT 34.7* 34.7*    242     CMP:   Recent Labs   Lab 02/26/19 1811 02/27/19  0640   * 131*   K 3.6 3.1*   CL 92* 94*   CO2 23 27   GLU 97 139*   BUN 20 19   CREATININE 1.3 1.4   CALCIUM 8.5* 8.7   PROT 7.0  --    ALBUMIN 3.5  --    BILITOT 1.3*  --    ALKPHOS 123  --    AST 95*  --    *  --    ANIONGAP 13 10   EGFRNONAA 47* 43*     Lactic Acid:   Recent Labs   Lab 02/27/19  1648   LACTATE 0.9     Magnesium: No results for input(s): MG in the last 48 hours.  Prealbumin: No results for input(s): PREALBUMIN in the last 48 hours.  TSH:   Recent Labs   Lab 02/22/19  0932   TSH 4.356*       Significant Imaging: none

## 2019-02-28 NOTE — ASSESSMENT & PLAN NOTE
-creatinine 1.4 GFR 43  -stable  -baseline creatinine 1.1-1.8 GFR 37 to >60  -continue to monitor closely with aggressive diuresis

## 2019-02-28 NOTE — PLAN OF CARE
Problem: SLP Goal  Goal: SLP Goal  Outcome: Outcome(s) achieved Date Met: 02/28/19  Consult for swallow study received this date, pt seen with wife in room. Pt tolerated all consistencies with no audible or outward signs of dysphagia. Will order oral supplements to increase caloric needs.

## 2019-03-01 LAB
ANION GAP SERPL CALC-SCNC: 8 MMOL/L
ANION GAP SERPL CALC-SCNC: 8 MMOL/L
BASOPHILS # BLD AUTO: 0.01 K/UL
BASOPHILS NFR BLD: 0.1 %
BUN SERPL-MCNC: 24 MG/DL
BUN SERPL-MCNC: 24 MG/DL
CALCIUM SERPL-MCNC: 8.3 MG/DL
CALCIUM SERPL-MCNC: 8.4 MG/DL
CHLORIDE SERPL-SCNC: 94 MMOL/L
CHLORIDE SERPL-SCNC: 94 MMOL/L
CO2 SERPL-SCNC: 29 MMOL/L
CO2 SERPL-SCNC: 29 MMOL/L
CREAT SERPL-MCNC: 1.3 MG/DL
CREAT SERPL-MCNC: 1.3 MG/DL
DIFFERENTIAL METHOD: ABNORMAL
EOSINOPHIL # BLD AUTO: 0.1 K/UL
EOSINOPHIL NFR BLD: 1.1 %
ERYTHROCYTE [DISTWIDTH] IN BLOOD BY AUTOMATED COUNT: 14.2 %
EST. GFR  (AFRICAN AMERICAN): 55 ML/MIN/1.73 M^2
EST. GFR  (AFRICAN AMERICAN): 55 ML/MIN/1.73 M^2
EST. GFR  (NON AFRICAN AMERICAN): 47 ML/MIN/1.73 M^2
EST. GFR  (NON AFRICAN AMERICAN): 47 ML/MIN/1.73 M^2
GLUCOSE SERPL-MCNC: 130 MG/DL
GLUCOSE SERPL-MCNC: 131 MG/DL
HCT VFR BLD AUTO: 30.5 %
HGB BLD-MCNC: 10.2 G/DL
INFLUENZA A, MOLECULAR: NEGATIVE
INFLUENZA B, MOLECULAR: NEGATIVE
LYMPHOCYTES # BLD AUTO: 1.2 K/UL
LYMPHOCYTES NFR BLD: 13.6 %
MCH RBC QN AUTO: 31.1 PG
MCHC RBC AUTO-ENTMCNC: 33.4 G/DL
MCV RBC AUTO: 93 FL
MONOCYTES # BLD AUTO: 0.9 K/UL
MONOCYTES NFR BLD: 10.8 %
NEUTROPHILS # BLD AUTO: 6.3 K/UL
NEUTROPHILS NFR BLD: 73.8 %
PLATELET # BLD AUTO: 207 K/UL
PMV BLD AUTO: 9.1 FL
POCT GLUCOSE: 139 MG/DL (ref 70–110)
POCT GLUCOSE: 143 MG/DL (ref 70–110)
POCT GLUCOSE: 176 MG/DL (ref 70–110)
POCT GLUCOSE: 268 MG/DL (ref 70–110)
POCT GLUCOSE: 57 MG/DL (ref 70–110)
POTASSIUM SERPL-SCNC: 3.4 MMOL/L
POTASSIUM SERPL-SCNC: 3.4 MMOL/L
RBC # BLD AUTO: 3.28 M/UL
SODIUM SERPL-SCNC: 131 MMOL/L
SODIUM SERPL-SCNC: 131 MMOL/L
SPECIMEN SOURCE: NORMAL
WBC # BLD AUTO: 8.54 K/UL

## 2019-03-01 PROCEDURE — 25000003 PHARM REV CODE 250: Performed by: HOSPITALIST

## 2019-03-01 PROCEDURE — S0030 INJECTION, METRONIDAZOLE: HCPCS | Performed by: FAMILY MEDICINE

## 2019-03-01 PROCEDURE — 94761 N-INVAS EAR/PLS OXIMETRY MLT: CPT

## 2019-03-01 PROCEDURE — 25000003 PHARM REV CODE 250: Performed by: NURSE PRACTITIONER

## 2019-03-01 PROCEDURE — 85025 COMPLETE CBC W/AUTO DIFF WBC: CPT

## 2019-03-01 PROCEDURE — 97530 THERAPEUTIC ACTIVITIES: CPT

## 2019-03-01 PROCEDURE — 25000003 PHARM REV CODE 250: Performed by: FAMILY MEDICINE

## 2019-03-01 PROCEDURE — 99900035 HC TECH TIME PER 15 MIN (STAT)

## 2019-03-01 PROCEDURE — 94660 CPAP INITIATION&MGMT: CPT

## 2019-03-01 PROCEDURE — 97162 PT EVAL MOD COMPLEX 30 MIN: CPT

## 2019-03-01 PROCEDURE — 99232 PR SUBSEQUENT HOSPITAL CARE,LEVL II: ICD-10-PCS | Mod: ,,, | Performed by: INTERNAL MEDICINE

## 2019-03-01 PROCEDURE — 80048 BASIC METABOLIC PNL TOTAL CA: CPT | Mod: 91

## 2019-03-01 PROCEDURE — 21400001 HC TELEMETRY ROOM

## 2019-03-01 PROCEDURE — 99232 SBSQ HOSP IP/OBS MODERATE 35: CPT | Mod: ,,, | Performed by: INTERNAL MEDICINE

## 2019-03-01 PROCEDURE — 36415 COLL VENOUS BLD VENIPUNCTURE: CPT

## 2019-03-01 PROCEDURE — 80048 BASIC METABOLIC PNL TOTAL CA: CPT

## 2019-03-01 PROCEDURE — 87502 INFLUENZA DNA AMP PROBE: CPT

## 2019-03-01 PROCEDURE — 63600175 PHARM REV CODE 636 W HCPCS: Performed by: FAMILY MEDICINE

## 2019-03-01 PROCEDURE — 27000221 HC OXYGEN, UP TO 24 HOURS

## 2019-03-01 PROCEDURE — 63600175 PHARM REV CODE 636 W HCPCS: Performed by: NURSE PRACTITIONER

## 2019-03-01 RX ORDER — POTASSIUM CHLORIDE 20 MEQ/15ML
40 SOLUTION ORAL 2 TIMES DAILY
Status: DISCONTINUED | OUTPATIENT
Start: 2019-03-01 | End: 2019-03-02 | Stop reason: HOSPADM

## 2019-03-01 RX ORDER — ADHESIVE BANDAGE
30 BANDAGE TOPICAL DAILY PRN
Status: DISCONTINUED | OUTPATIENT
Start: 2019-03-01 | End: 2019-03-02 | Stop reason: HOSPADM

## 2019-03-01 RX ORDER — FUROSEMIDE 40 MG/1
40 TABLET ORAL 2 TIMES DAILY
Status: DISCONTINUED | OUTPATIENT
Start: 2019-03-01 | End: 2019-03-02 | Stop reason: HOSPADM

## 2019-03-01 RX ADMIN — DOCUSATE SODIUM 100 MG: 100 CAPSULE, LIQUID FILLED ORAL at 09:03

## 2019-03-01 RX ADMIN — ERYTHROMYCIN 1 INCH: 5 OINTMENT OPHTHALMIC at 09:03

## 2019-03-01 RX ADMIN — METRONIDAZOLE 500 MG: 500 INJECTION, SOLUTION INTRAVENOUS at 05:03

## 2019-03-01 RX ADMIN — FAMOTIDINE 20 MG: 20 TABLET ORAL at 09:03

## 2019-03-01 RX ADMIN — METOPROLOL TARTRATE 50 MG: 50 TABLET ORAL at 09:03

## 2019-03-01 RX ADMIN — HYDRALAZINE HYDROCHLORIDE 25 MG: 25 TABLET, FILM COATED ORAL at 09:03

## 2019-03-01 RX ADMIN — CETIRIZINE HYDROCHLORIDE 10 MG: 10 TABLET, FILM COATED ORAL at 09:03

## 2019-03-01 RX ADMIN — ASPIRIN 81 MG: 81 TABLET, COATED ORAL at 09:03

## 2019-03-01 RX ADMIN — METRONIDAZOLE 500 MG: 500 INJECTION, SOLUTION INTRAVENOUS at 03:03

## 2019-03-01 RX ADMIN — ACETAMINOPHEN 650 MG: 325 TABLET ORAL at 03:03

## 2019-03-01 RX ADMIN — INSULIN ASPART 3 UNITS: 100 INJECTION, SOLUTION INTRAVENOUS; SUBCUTANEOUS at 11:03

## 2019-03-01 RX ADMIN — LEVOTHYROXINE SODIUM 50 MCG: 75 TABLET ORAL at 06:03

## 2019-03-01 RX ADMIN — HYDRALAZINE HYDROCHLORIDE 25 MG: 25 TABLET, FILM COATED ORAL at 03:03

## 2019-03-01 RX ADMIN — METRONIDAZOLE 500 MG: 500 INJECTION, SOLUTION INTRAVENOUS at 09:03

## 2019-03-01 RX ADMIN — CEFTRIAXONE 1 G: 1 INJECTION, SOLUTION INTRAVENOUS at 05:03

## 2019-03-01 RX ADMIN — DEXTROSE MONOHYDRATE 12.5 G: 25 INJECTION, SOLUTION INTRAVENOUS at 03:03

## 2019-03-01 RX ADMIN — HYDRALAZINE HYDROCHLORIDE 25 MG: 25 TABLET, FILM COATED ORAL at 06:03

## 2019-03-01 RX ADMIN — TAMSULOSIN HYDROCHLORIDE 0.8 MG: 0.4 CAPSULE ORAL at 09:03

## 2019-03-01 RX ADMIN — FUROSEMIDE 40 MG: 40 TABLET ORAL at 05:03

## 2019-03-01 RX ADMIN — ATORVASTATIN CALCIUM 40 MG: 40 TABLET, FILM COATED ORAL at 09:03

## 2019-03-01 RX ADMIN — FUROSEMIDE 40 MG: 40 TABLET ORAL at 09:03

## 2019-03-01 RX ADMIN — APIXABAN 2.5 MG: 2.5 TABLET, FILM COATED ORAL at 09:03

## 2019-03-01 RX ADMIN — AMLODIPINE BESYLATE 10 MG: 5 TABLET ORAL at 09:03

## 2019-03-01 RX ADMIN — POTASSIUM CHLORIDE 40 MEQ: 20 SOLUTION ORAL at 09:03

## 2019-03-01 RX ADMIN — ACETAMINOPHEN 650 MG: 325 TABLET ORAL at 11:03

## 2019-03-01 RX ADMIN — AMIODARONE HYDROCHLORIDE 200 MG: 200 TABLET ORAL at 09:03

## 2019-03-01 NOTE — ASSESSMENT & PLAN NOTE
-temp 48 hours ago of 101.3; afebrile overnight  -+cough and wheezing this AM; PNA treatment per primary team  -further recs per primary team

## 2019-03-01 NOTE — PLAN OF CARE
Problem: Physical Therapy Goal  Goal: Physical Therapy Goal  Goals to be met by: Discharge     Patient will increase functional independence with mobility by performin. Supine to sit with Set-up [place]  2. Sit to stand transfer with Mod C/ RW   3. Bed to chair transfer with Modified [place] using Rolling Walker  4. Gait  x 100ft+ feet with Modified [place] using Rolling Walker.     Outcome: Ongoing (interventions implemented as appropriate)  PT Eval completed. DC Rec: HHPT. DME Rec: Rollator Walker

## 2019-03-01 NOTE — PT/OT/SLP PROGRESS
Occupational Therapy      Patient Name:  Aleksandr Schultz   MRN:  [id number]    Patient not seen today secondary to (Family deferred OT today 2/2 pt with 102 fever and just finished with PT and pt fatigued.  ). Will follow-up as able.    Zandra Corona, OT  3/1/2019

## 2019-03-01 NOTE — ASSESSMENT & PLAN NOTE
-related to dietary indiscretion of salt intake  -; baseline 113; BLE edema and JVD remain on exam  -aggressive diuresis with IV Lasix BID with 2.7 liters out overnight; negative 5.2 liters since admission; JVD, SOB and LE edema improved; IV Lasix de escalated to oral Lasix BID which is reasonable   -continue Norvasc, Hydralaizne and Metoprolol with decent afterload reduction  -continue with strict I&Os and daily weights; dietian consulted to low salt diet education/reinforcement  l

## 2019-03-01 NOTE — PLAN OF CARE
Problem: Adult Inpatient Plan of Care  Goal: Plan of Care Review  Outcome: Ongoing (interventions implemented as appropriate)  1918H Patient is awake and orientedx4. Care plan explained and verbalized understanding. Daughter at bedside. VIP care model explained. Both eyes with yellowish crusty drainage, positive for redness. On oxygen 2lpm/nasal cannula, O2 saturation 93%. On heart monitor running Sinus Rhythm with First Degree AV Block at 60s. IV site to left forearm 20G, metronidazole infusing. External urinary catheter (condom catheter) in placed; draining clear yellow urine. Compression stockings on. Non-skid socks on. Maintained on low salt, low fat diet. No complaints of pain or shortness of breath. Able to turn himself in bed. Maintained on fall precaution. Bed in lowest position, bed alarms on, call light within reach and instructed to call for help when needed. Grandson remain at bedside. Daughter remain at bedside. Will continue  to monitor.    0342H Patient is confused to person, pace and situation. Blood sugar checked- 57mg/dL. Dextrsoe 12.5g given through IV.  Edwin recheck blood sugar later. After dextrose was given, patient orientedx4. Will continue to monitor.

## 2019-03-01 NOTE — PT/OT/SLP EVAL
Physical Therapy Evaluation    Patient Name:  Aleksandr Schultz   MRN:  [id number]    Recommendations:     Discharge Recommendations:  (HHPT)   Discharge Equipment Recommendations: other (see comments)(Rollator)   Barriers to discharge: Decreased caregiver support and Impaired activity tolerance at this time    Assessment:     Aleksandr Schultz is a 89 y.o. male admitted with a medical diagnosis of Aspiration pneumonia.  He presents with the following impairments/functional limitations:  weakness, impaired self care skills, impaired balance, decreased coordination, impaired endurance, impaired functional mobilty, gait instability, pain, decreased lower extremity function, impaired cardiopulmonary response to activity. Pt requires Mod A for basic bed mobility c/ extra time d/t impaired activity tolerance, low back pain; Mod a c/ sit<>stand and gait for 12ft c/ RW. PT recommend Rollator walker on DC to home c/ HHPT to improve functional independence c/ ADLS.    Rehab Prognosis: Good; patient would benefit from acute skilled PT services to address these deficits and reach maximum level of function.    Recent Surgery: * No surgery found *      Plan:     During this hospitalization, patient to be seen 5 x/week to address the identified rehab impairments via gait training, therapeutic activities, therapeutic exercises and progress toward the following goals:    · Plan of Care Expires:  03/01/19    Subjective     Chief Complaint: coughing and pain in back when OOB.  Patient/Family Comments/goals: Agreed to PT Eval and PT POC.  Pain/Comfort:  · Pain Rating 1: 10/10(low back pain with sitting, standing )  · Location - Orientation 1: lower  · Location 1: back  · Pain Addressed 1: Reposition, Distraction  · Pain Rating Post-Intervention 1: 4/10    Patients cultural, spiritual, Evangelical conflicts given the current situation: no    Living Environment:  Lives c/ spouse in 1SH no steps to enter; daughter lives next house. 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  Prior to admission, patients level of function was Mod I, per daughter ?walker in home; quad cane c/ gait out of home and very active.  Equipment used at home: CPAP, nebulizer, bedside commode, wheelchair, cane, quad, walker, standard.  DME owned (not currently used): none.  Upon discharge, patient will have assistance from daughter.    Objective:     Communicated with RNLisa prior to session.  Patient found all lines intact, call button in reach, bed alarm on and daughter present telemetry, Condom Catheter, peripheral IV  upon PT entry to room.    General Precautions: Standard, fall, contact   Orthopedic Precautions:N/A   Braces: N/A     Exams:  · Cognitive Exam:  Patient is oriented to Person, Place, Time and Situation  // [place] c/ R hearing aid.  · Gross Motor Coordination:  WFL  · Postural Exam:  Patient presented with the following abnormalities:    · -       Rounded shoulders  · -       Forward head  · -       Affected scapula elevated  · -       Posterior pelvic tilt  · -       Kyphosis  · -       Backward body lean needing min A to correct in sitting EOB & dynamic gait  · RLE ROM: WFL  · RLE Strength: WFL  · LLE ROM: 3+/5  · LLE Strength: 3+/5    Functional Mobility:  · Bed Mobility:     · Rolling Right: moderate assistance  · Scooting: moderate assistance  · Bridging: moderate assistance and Vc to use arm rails for positioning in bed  · Supine to Sit: moderate assistance  · Sit to Supine: moderate assistance and assist c/ maribel LE  · Transfers:     · Sit to Stand:  moderate assistance with rolling walker  · Gait: 12ft at Min <> Mod A, wide LIYAH; kyphotic flexed trunk c/ slight post lean  · Balance: Dynamic gait- Fair-      Therapeutic Activities and Exercises:  PT Eval completed. Instructed in progressive mobility supine<> sit EOB- pt r/o post back pain 10/10; however able to carry on conversation c/ PT and daughter. Sitting tolerance activity x5min c/ maribel LE exercises of  LAQ x 3reps EA. Pt demo frequent and repeated productive coughing that left him SOB; therefore educated in energy conservation techniques and DBExs to improve activity chiquis.  Proceeded to instruct pt in sit<>stand transfer c/ cues for hand placement to facilitate max safety and independence; c/ min A to correct post lean, gait back<>forth 12ft c/ gait pattern as described above. Transferred back into bed; and instructed in proper postioning using bridging and scooting as tolerated c/ frequent rest breaks and DBExs. Left in quarter turn to R with wedge pillow.    AM-PAC 6 CLICK MOBILITY  Total Score:12     Patient left left sidelying with all lines intact, call button in reach, bed alarm on and daughter present.    GOALS:   Multidisciplinary Problems     Physical Therapy Goals        Problem: Physical Therapy Goal    Goal Priority Disciplines Outcome Goal Variances Interventions   Physical Therapy Goal     PT, PT/OT Ongoing (interventions implemented as appropriate)     Description:  Goals to be met by: Discharge     Patient will increase functional independence with mobility by performin. Supine to sit with Set-up [place]  2. Sit to stand transfer with Mod C/ RW   3. Bed to chair transfer with Modified [place] using Rolling Walker  4. Gait  x 100ft+ feet with Modified [place] using Rolling Walker.                       History:     Past Medical History:   Diagnosis Date    Aortic aneurysm     Asthma, chronic     Chronic kidney disease     CKD (chronic kidney disease)     COPD (chronic obstructive pulmonary disease)     Coronary artery disease     Diabetes mellitus     Glaucoma (increased eye pressure)     Hypertension     Peripheral vascular disease     Prostate cancer     Sleep apnea     cpap    Small bowel obstruction     Status post balloon angioplasty of pulmonary artery branches         Stroke            Past Surgical History:   Procedure Laterality Date     BACK SURGERY      x2 lumbar    CAROTID ARTERY ANGIOPLASTY      COLONOSCOPY N/A 3/25/2013    Performed by Wiliam Kim MD at [facility] (4TH FLR)    HERNIA REPAIR      PI OU      laser PI OU    SHOULDER SURGERY      rotator bilateral       Time Tracking:     PT Received On: 03/01/19  PT Start Time: 1032     PT Stop Time: 1109  PT Total Time (min): 37 min     Billable Minutes: Evaluation 14 and Therapeutic Activity 23      Alex Davis, PT  03/01/2019

## 2019-03-01 NOTE — PLAN OF CARE
Problem: Adult Inpatient Plan of Care  Goal: Plan of Care Review  Outcome: Ongoing (interventions implemented as appropriate)  Patient on oxygen with documented flow.  Will attempt to wean per O2 order protocol. Will continue to monitor.

## 2019-03-01 NOTE — SUBJECTIVE & OBJECTIVE
Review of Systems   Constitution: Negative for chills, decreased appetite, diaphoresis, fever and weakness.   Cardiovascular: Negative for chest pain, claudication, cyanosis, dyspnea on exertion, irregular heartbeat, leg swelling, near-syncope, orthopnea, palpitations, paroxysmal nocturnal dyspnea and syncope.   Respiratory: Positive for cough. Negative for hemoptysis, shortness of breath and wheezing.    Gastrointestinal: Negative for bloating, abdominal pain, constipation, diarrhea, melena, nausea and vomiting.   Neurological: Negative for dizziness.     Objective:     Vital Signs (Most Recent):  Temp: (!) 100.5 °F (38.1 °C) (03/01/19 0821)  Pulse: 70 (03/01/19 0821)  Resp: (!) 24 (03/01/19 0821)  BP: (!) 167/71 (03/01/19 0821)  SpO2: (!) 93 % (03/01/19 0829) Vital Signs (24h Range):  Temp:  [97.1 °F (36.2 °C)-100.5 °F (38.1 °C)] 100.5 °F (38.1 °C)  Pulse:  [56-70] 70  Resp:  [14-24] 24  SpO2:  [92 %-95 %] 93 %  BP: (136-167)/(63-71) 167/71     Weight: 83.7 kg (184 lb 8.4 oz)  Body mass index is 28.06 kg/m².     SpO2: (!) 93 %  O2 Device (Oxygen Therapy): nasal cannula      Intake/Output Summary (Last 24 hours) at 3/1/2019 0938  Last data filed at 3/1/2019 0500  Gross per 24 hour   Intake 1285 ml   Output 2750 ml   Net -1465 ml       Lines/Drains/Airways     Drain            Male External Urinary Catheter 02/27/19 1500 1 day          Peripheral Intravenous Line                 Peripheral IV - Single Lumen 02/26/19 1812 Left Forearm 2 days                Physical Exam   Constitutional: He is oriented to person, place, and time. He appears well-developed and well-nourished. No distress.   Neck: No JVD present.   Cardiovascular: Normal rate and regular rhythm. Exam reveals no gallop.   No murmur heard.  Pulmonary/Chest: Effort normal. No respiratory distress. He has decreased breath sounds. He has no wheezes.   Abdominal: Soft. Bowel sounds are normal. He exhibits no distension. There is no tenderness.    Neurological: He is alert and oriented to person, place, and time.   Skin: Skin is warm and dry.       Significant Labs:     Recent Labs   Lab 03/01/19  0808   *  131*   K 3.4*  3.4*   CL 94*  94*   CO2 29  29   BUN 24  24   CREATININE 1.3  1.3     Recent Labs   Lab 03/01/19  0808   WBC 8.54   RBC 3.28*   HGB 10.2*   HCT 30.5*      MCV 93   MCH 31.1*   MCHC 33.4       Significant Imaging:     TTE 2/27/2019    · Normal left ventricular systolic function. The estimated ejection fraction is 55%  · Grade I (mild) left ventricular diastolic dysfunction consistent with impaired relaxation.  · Normal right ventricular systolic function.  · Normal left atrial pressure.  · Mild aortic regurgitation.  · The estimated PA systolic pressure is 33 mm Hg  · Intermediate central venous pressure (8 mm Hg).

## 2019-03-01 NOTE — PLAN OF CARE
Problem: Adult Inpatient Plan of Care  Goal: Plan of Care Review  Outcome: Ongoing (interventions implemented as appropriate)  Patient awake and alert. Daughter at bedside throughout shift. Cardiac diet, tolerated well. No complaints of pain. Max temp of 102.0 today with acetaminophen given. Contact precautions maintained. Blood glucose monitored with sliding scale given as needed. Condom catheter changed today. 2L/NC sats at 96%. Ambulatory with cane/walker and stand by assist. Tested for flu today; results negative. Safety maintained. Bed locked and in lowest position. Call light and personal items within reach. Advised patient to call for assistance with mobility, pt verbalized understanding.

## 2019-03-01 NOTE — PLAN OF CARE
Problem: Adult Inpatient Plan of Care  Goal: Plan of Care Review  Patient is laying in bed , daughter is at bedside. Iv antibiotics given as per md order. No noted distress during shift, safety measures in place will continue to monitor

## 2019-03-01 NOTE — PROGRESS NOTES
"[facility] Medicine  Progress Note    Patient Name: Aleksandr Schultz  MRN: [id number]  Patient Class: IP- Inpatient   Admission Date: 2/26/2019  Length of Stay: 3 days  Attending Physician: Robyn Pacheco*  Primary Care Provider: Ascencion Cedeño MD        Subjective:     Principal Problem:Aspiration pneumonia    HPI:  Aleksandr Schultz (Jimmy) is a 89 yo male with HTN, HLD, CAD, chronic diastolic  Dysfunction, bilateral carotid artery disease, CVA, AAA, PAD, paroxysmal atrial fibrillation, type 2 diabetes mellitus, nonrheumatic aortic valve stenosis, sinus noemy-tachy syndrome and obstructive sleep apnea. Per daughter at bedside patient  AAOx3 at baseline, very active and still drives. He lives in [place], [place]. His primary care physician is Dr. Ascencion Cedeño. His cardiologist is Dr. Lynne Buckner.  He presented to [facility] 2/26/19 with complaint of worsening SOB/GONZALEZ and bilateral lower extremity edema over the past 3 days. He attended a birthday party on Saturday, 2/23 . He ate salty food. On Sunday, 2/24, he felt weak and short of breath.His daughter took him to ER where he was checked, but except for O2 sats of 91-93% they found no abnormalities.  Medications were not changed at the time. The patient followed up with his cardiologist on 2/25. His lasix was increased to 40 mg BID at the time. Per daughter at bedside patient with ongoing SOB, worse than baseline, lethargy and lower extremity  edema despite increased po lasix. He has also had decreased urinary output, non-productive cough and very little sleep due to SOB when lying down.   Labs in ED remarkable for WBC (12.80), na (128), AST (95), ALT (100), BNP (416). Troponin negative. CXR shows course asymmetric opacities seen within the left lower lung zone.  This may represent chronic change or atelectasis although difficult to exclude potential aspiration or developing pneumonia in the right clinical setting.  No focal " consolidation seen. Low grade temp (99.8) on presentation, BP stable. Patient received 80 mg IV lasix in ED. Patient admitted to [facility] for further care.       Hospital Course:  CXR reports possible aspiration Pneumonia. Start Rocephin and Flagyl       2/28 TSH has been chronically low since 2017 with no meds on record. Start synthroid. Repeat CXR in AM, PT/OT  3/1 switch Lasix to PO, replace K for possible d/c tomorrow    Interval History: low grade fever, awake and alert. daughter by bedside. Updated patient and daughter on clinical course  Wbc improved,. Switch lasix to po  CXR reported improving pneumonia. Continue Rocephin/ Flagyl   Blood cx NGTD,       Review of Systems   Constitutional: Negative for chills and fever.   Respiratory: Positive for cough and shortness of breath. Negative for chest tightness and wheezing.    Cardiovascular: Positive for leg swelling. Negative for chest pain and palpitations.   Gastrointestinal: Negative for abdominal pain, diarrhea, nausea and vomiting.   Genitourinary: Negative for dysuria, flank pain and hematuria.   Musculoskeletal: Negative for back pain, myalgias and neck pain.   Skin: Negative for rash and wound.   Neurological: Positive for weakness. Negative for dizziness, syncope and headaches.   Psychiatric/Behavioral: Negative for agitation.     Objective:     Vital Signs (Most Recent):  Temp: 99.8 °F (37.7 °C) (03/01/19 0457)  Pulse: 67 (03/01/19 0457)  Resp: 14 (03/01/19 0457)  BP: (!) 146/69 (03/01/19 0457)  SpO2: (!) 94 % (03/01/19 0434) Vital Signs (24h Range):  Temp:  [97.1 °F (36.2 °C)-101.3 °F (38.5 °C)] 99.8 °F (37.7 °C)  Pulse:  [56-68] 67  Resp:  [14-24] 14  SpO2:  [93 %-96 %] 94 %  BP: (136-164)/(63-73) 146/69     Weight: 83.7 kg (184 lb 8.4 oz)  Body mass index is 28.06 kg/m².    Intake/Output Summary (Last 24 hours) at 3/1/2019 0719  Last data filed at 3/1/2019 0500  Gross per 24 hour   Intake 1285 ml   Output 2750 ml   Net -1465 ml       Physical Exam   Constitutional: He is oriented to person, place, and time. He appears well-developed and well-nourished. No distress.   HENT:   Head: Normocephalic and atraumatic.   Eyes: Conjunctivae are normal. Pupils are equal, round, and reactive to light.   Neck: Normal range of motion. Neck supple. No JVD present.   Cardiovascular: Normal rate, regular rhythm and intact distal pulses.   No murmur heard.  Pulmonary/Chest: Effort normal and breath sounds normal. No respiratory distress. He has no wheezes.   Abdominal: Soft. Bowel sounds are normal. He exhibits no distension. There is no tenderness. There is no guarding.   Musculoskeletal: Normal range of motion. He exhibits edema (1+ bilateral lower extremities ). He exhibits no tenderness.   Neurological: He is alert and oriented to person, place, and time.   Skin: Skin is warm and dry. Capillary refill takes less than 2 seconds. No erythema.   Psychiatric: He has a normal mood and affect. His behavior is normal.       Significant Labs:   Blood Culture:   Recent Labs   Lab 02/27/19  1645   LABBLOO No Growth to date  No Growth to date     CBC:   Recent Labs   Lab 02/28/19  0734   WBC 10.96   HGB 10.9*   HCT 32.6*        CMP:   Recent Labs   Lab 02/28/19  0734   *   K 3.0*   CL 96   CO2 31*   *   BUN 20   CREATININE 1.4   CALCIUM 8.5*   ANIONGAP 6*   EGFRNONAA 43*     Lactic Acid:   Recent Labs   Lab 02/27/19  1648   LACTATE 0.9     Magnesium: No results for input(s): MG in the last 48 hours.  Prealbumin: No results for input(s): PREALBUMIN in the last 48 hours.  TSH:   Recent Labs   Lab 02/22/19  0932   TSH 4.356*       Significant Imaging: none    Assessment/Plan:      * Aspiration pneumonia    CXR reports possible aspiration  Rocephin and Flagyl  SLP       Acquired hypothyroidism    Chronic with no meds on home meds  Start Synthroid and monitor       Hypokalemia    replace     Acute conjunctivitis of both eyes    Bilateral eye  discharge  erythromycin ointment       Acute diastolic congestive heart failure    -continue lasix 40 mg BID   -strict intake/output and daily weights   -cardiac  Diet   -echo  -cardiology consulted per ED  -monitor tele        CKD (chronic kidney disease) stage 3, GFR 30-59 ml/min    BUN/CR 20/1.3   Monitor renal function   Avoid nephrotoxic meds        Hypertension associated with diabetes    Hyperlipidemia   PAD    -188   Continue amlodipine, metoprolol, ASA/statin      MICHELLE on CPAP    CPAP per home settings        Cerebrovascular accident (CVA)    Continue asa/statin        Paroxysmal atrial fibrillation    Continue  Amiodarone and eliquis  Monitor tele        Type 2 diabetes, controlled, with neuropathy    A1C 6.8   Hold po medications   SSI   Low dose SSI   Accucheck AC&HS        Nonrheumatic aortic valve stenosis    Chronic        COPD (chronic obstructive pulmonary disease)    Chronic   Continue supplemental O2, wean as tolerated   Pt not on home O2        Bilateral carotid artery disease    Chronic   Taking ASA/statin        AAA (abdominal aortic aneurysm)    Chronic         VTE Risk Mitigation (From admission, onward)        Ordered     apixaban tablet 2.5 mg  2 times daily      02/27/19 0603     IP VTE HIGH RISK PATIENT  Once      02/26/19 2116              Robyn Pacheco MD  Department of Hospital Medicine   [facility]

## 2019-03-02 VITALS
WEIGHT: 184.5 LBS | TEMPERATURE: 100 F | RESPIRATION RATE: 16 BRPM | HEART RATE: 69 BPM | SYSTOLIC BLOOD PRESSURE: 164 MMHG | DIASTOLIC BLOOD PRESSURE: 72 MMHG | HEIGHT: 68 IN | BODY MASS INDEX: 27.96 KG/M2 | OXYGEN SATURATION: 92 %

## 2019-03-02 PROBLEM — J69.0 ASPIRATION PNEUMONIA: Status: RESOLVED | Noted: 2019-02-27 | Resolved: 2019-03-02

## 2019-03-02 LAB
ANION GAP SERPL CALC-SCNC: 6 MMOL/L
BASOPHILS # BLD AUTO: 0.02 K/UL
BASOPHILS NFR BLD: 0.2 %
BUN SERPL-MCNC: 23 MG/DL
CALCIUM SERPL-MCNC: 8.6 MG/DL
CHLORIDE SERPL-SCNC: 97 MMOL/L
CO2 SERPL-SCNC: 28 MMOL/L
CREAT SERPL-MCNC: 1.3 MG/DL
DIFFERENTIAL METHOD: ABNORMAL
EOSINOPHIL # BLD AUTO: 0.1 K/UL
EOSINOPHIL NFR BLD: 1.3 %
ERYTHROCYTE [DISTWIDTH] IN BLOOD BY AUTOMATED COUNT: 14.7 %
EST. GFR  (AFRICAN AMERICAN): 55 ML/MIN/1.73 M^2
EST. GFR  (NON AFRICAN AMERICAN): 47 ML/MIN/1.73 M^2
GLUCOSE SERPL-MCNC: 171 MG/DL
HCT VFR BLD AUTO: 32.4 %
HGB BLD-MCNC: 10.5 G/DL
LYMPHOCYTES # BLD AUTO: 1.1 K/UL
LYMPHOCYTES NFR BLD: 13.1 %
MCH RBC QN AUTO: 30.3 PG
MCHC RBC AUTO-ENTMCNC: 32.4 G/DL
MCV RBC AUTO: 93 FL
MONOCYTES # BLD AUTO: 0.9 K/UL
MONOCYTES NFR BLD: 10.9 %
NEUTROPHILS # BLD AUTO: 6.2 K/UL
NEUTROPHILS NFR BLD: 73.8 %
PLATELET # BLD AUTO: 247 K/UL
PMV BLD AUTO: 9.4 FL
POCT GLUCOSE: 204 MG/DL (ref 70–110)
POCT GLUCOSE: 309 MG/DL (ref 70–110)
POTASSIUM SERPL-SCNC: 4.3 MMOL/L
RBC # BLD AUTO: 3.47 M/UL
SODIUM SERPL-SCNC: 131 MMOL/L
WBC # BLD AUTO: 8.37 K/UL

## 2019-03-02 PROCEDURE — 97530 THERAPEUTIC ACTIVITIES: CPT

## 2019-03-02 PROCEDURE — 63600175 PHARM REV CODE 636 W HCPCS: Performed by: NURSE PRACTITIONER

## 2019-03-02 PROCEDURE — 99232 PR SUBSEQUENT HOSPITAL CARE,LEVL II: ICD-10-PCS | Mod: ,,, | Performed by: INTERNAL MEDICINE

## 2019-03-02 PROCEDURE — 25000003 PHARM REV CODE 250: Performed by: FAMILY MEDICINE

## 2019-03-02 PROCEDURE — 97535 SELF CARE MNGMENT TRAINING: CPT

## 2019-03-02 PROCEDURE — 63600175 PHARM REV CODE 636 W HCPCS: Performed by: INTERNAL MEDICINE

## 2019-03-02 PROCEDURE — 94640 AIRWAY INHALATION TREATMENT: CPT

## 2019-03-02 PROCEDURE — 94761 N-INVAS EAR/PLS OXIMETRY MLT: CPT

## 2019-03-02 PROCEDURE — 80048 BASIC METABOLIC PNL TOTAL CA: CPT

## 2019-03-02 PROCEDURE — 99232 SBSQ HOSP IP/OBS MODERATE 35: CPT | Mod: ,,, | Performed by: INTERNAL MEDICINE

## 2019-03-02 PROCEDURE — 27000221 HC OXYGEN, UP TO 24 HOURS

## 2019-03-02 PROCEDURE — 25000003 PHARM REV CODE 250: Performed by: HOSPITALIST

## 2019-03-02 PROCEDURE — 25000003 PHARM REV CODE 250: Performed by: NURSE PRACTITIONER

## 2019-03-02 PROCEDURE — 85025 COMPLETE CBC W/AUTO DIFF WBC: CPT

## 2019-03-02 PROCEDURE — 36415 COLL VENOUS BLD VENIPUNCTURE: CPT

## 2019-03-02 PROCEDURE — S0030 INJECTION, METRONIDAZOLE: HCPCS | Performed by: FAMILY MEDICINE

## 2019-03-02 PROCEDURE — 25000242 PHARM REV CODE 250 ALT 637 W/ HCPCS: Performed by: NURSE PRACTITIONER

## 2019-03-02 PROCEDURE — 97165 OT EVAL LOW COMPLEX 30 MIN: CPT

## 2019-03-02 RX ORDER — AMOXICILLIN AND CLAVULANATE POTASSIUM 875; 125 MG/1; MG/1
1 TABLET, FILM COATED ORAL EVERY 12 HOURS
Status: DISCONTINUED | OUTPATIENT
Start: 2019-03-02 | End: 2019-03-02 | Stop reason: HOSPADM

## 2019-03-02 RX ORDER — ADHESIVE BANDAGE
30 BANDAGE TOPICAL DAILY PRN
COMMUNITY
Start: 2019-03-02 | End: 2020-10-12

## 2019-03-02 RX ORDER — LEVOTHYROXINE SODIUM 50 UG/1
50 TABLET ORAL
Qty: 30 TABLET | Refills: 11 | Status: SHIPPED | OUTPATIENT
Start: 2019-03-03 | End: 2019-09-03

## 2019-03-02 RX ORDER — POTASSIUM CHLORIDE 20 MEQ/15ML
40 SOLUTION ORAL DAILY
Qty: 473 ML | Refills: 1 | Status: SHIPPED | OUTPATIENT
Start: 2019-03-02 | End: 2019-05-29 | Stop reason: ALTCHOICE

## 2019-03-02 RX ORDER — ACETAMINOPHEN 325 MG/1
650 TABLET ORAL EVERY 4 HOURS PRN
Refills: 0 | COMMUNITY
Start: 2019-03-02

## 2019-03-02 RX ORDER — AMOXICILLIN AND CLAVULANATE POTASSIUM 875; 125 MG/1; MG/1
1 TABLET, FILM COATED ORAL EVERY 12 HOURS
Qty: 8 TABLET | Refills: 0 | Status: SHIPPED | OUTPATIENT
Start: 2019-03-02 | End: 2019-03-06

## 2019-03-02 RX ORDER — FUROSEMIDE 10 MG/ML
40 INJECTION INTRAMUSCULAR; INTRAVENOUS ONCE
Status: COMPLETED | OUTPATIENT
Start: 2019-03-02 | End: 2019-03-02

## 2019-03-02 RX ORDER — FUROSEMIDE 40 MG/1
40 TABLET ORAL 2 TIMES DAILY
Qty: 60 TABLET | Refills: 11 | Status: SHIPPED | OUTPATIENT
Start: 2019-03-02 | End: 2019-05-29 | Stop reason: SDUPTHER

## 2019-03-02 RX ORDER — ERYTHROMYCIN 5 MG/G
OINTMENT OPHTHALMIC EVERY 6 HOURS
Qty: 3.5 G | Refills: 0 | Status: SHIPPED | OUTPATIENT
Start: 2019-03-02 | End: 2019-09-03 | Stop reason: ALTCHOICE

## 2019-03-02 RX ADMIN — POTASSIUM CHLORIDE 40 MEQ: 20 SOLUTION ORAL at 09:03

## 2019-03-02 RX ADMIN — METRONIDAZOLE 500 MG: 500 INJECTION, SOLUTION INTRAVENOUS at 03:03

## 2019-03-02 RX ADMIN — LEVOTHYROXINE SODIUM 50 MCG: 75 TABLET ORAL at 05:03

## 2019-03-02 RX ADMIN — ATORVASTATIN CALCIUM 40 MG: 40 TABLET, FILM COATED ORAL at 09:03

## 2019-03-02 RX ADMIN — FAMOTIDINE 20 MG: 20 TABLET ORAL at 09:03

## 2019-03-02 RX ADMIN — FUROSEMIDE 40 MG: 10 INJECTION, SOLUTION INTRAVENOUS at 11:03

## 2019-03-02 RX ADMIN — HYDRALAZINE HYDROCHLORIDE 25 MG: 25 TABLET, FILM COATED ORAL at 01:03

## 2019-03-02 RX ADMIN — HYDRALAZINE HYDROCHLORIDE 25 MG: 25 TABLET, FILM COATED ORAL at 05:03

## 2019-03-02 RX ADMIN — IPRATROPIUM BROMIDE AND ALBUTEROL SULFATE 3 ML: .5; 3 SOLUTION RESPIRATORY (INHALATION) at 12:03

## 2019-03-02 RX ADMIN — METOPROLOL TARTRATE 50 MG: 50 TABLET ORAL at 09:03

## 2019-03-02 RX ADMIN — INSULIN ASPART 4 UNITS: 100 INJECTION, SOLUTION INTRAVENOUS; SUBCUTANEOUS at 01:03

## 2019-03-02 RX ADMIN — AMLODIPINE BESYLATE 10 MG: 5 TABLET ORAL at 09:03

## 2019-03-02 RX ADMIN — ACETAMINOPHEN 650 MG: 325 TABLET ORAL at 06:03

## 2019-03-02 RX ADMIN — APIXABAN 2.5 MG: 2.5 TABLET, FILM COATED ORAL at 09:03

## 2019-03-02 RX ADMIN — CETIRIZINE HYDROCHLORIDE 10 MG: 10 TABLET, FILM COATED ORAL at 09:03

## 2019-03-02 RX ADMIN — ASPIRIN 81 MG: 81 TABLET, COATED ORAL at 09:03

## 2019-03-02 RX ADMIN — AMOXICILLIN AND CLAVULANATE POTASSIUM 1 TABLET: 875; 125 TABLET, FILM COATED ORAL at 09:03

## 2019-03-02 RX ADMIN — DOCUSATE SODIUM 100 MG: 100 CAPSULE, LIQUID FILLED ORAL at 09:03

## 2019-03-02 RX ADMIN — FUROSEMIDE 40 MG: 40 TABLET ORAL at 09:03

## 2019-03-02 NOTE — PROGRESS NOTES
"[facility]  Progress Note    Patient Name: Aleksandr Schultz  MRN: [id number]  Patient Class: IP- Inpatient   Admission Date: 2/26/2019  Length of Stay: 4 days  Attending Physician: Robyn Pacheco*  Primary Care Provider: Ascencion Cedeño MD        Subjective:     Principal Problem:Aspiration pneumonia    HPI:  Aleksandr Schultz (Jimmy) is a 89 yo male with HTN, HLD, CAD, chronic diastolic  Dysfunction, bilateral carotid artery disease, CVA, AAA, PAD, paroxysmal atrial fibrillation, type 2 diabetes mellitus, nonrheumatic aortic valve stenosis, sinus noemy-tachy syndrome and obstructive sleep apnea. Per daughter at bedside patient  AAOx3 at baseline, very active and still drives. He lives in [place], [place]. His primary care physician is Dr. Ascencion Cedeño. His cardiologist is Dr. Lynne Buckner.  He presented to [facility] 2/26/19 with complaint of worsening SOB/GONZALEZ and bilateral lower extremity edema over the past 3 days. He attended a birthday party on Saturday, 2/23 . He ate salty food. On Sunday, 2/24, he felt weak and short of breath.His daughter took him to ER where he was checked, but except for O2 sats of 91-93% they found no abnormalities.  Medications were not changed at the time. The patient followed up with his cardiologist on 2/25. His lasix was increased to 40 mg BID at the time. Per daughter at bedside patient with ongoing SOB, worse than baseline, lethargy and lower extremity  edema despite increased po lasix. He has also had decreased urinary output, non-productive cough and very little sleep due to SOB when lying down.   Labs in ED remarkable for WBC (12.80), na (128), AST (95), ALT (100), BNP (416). Troponin negative. CXR shows course asymmetric opacities seen within the left lower lung zone.  This may represent chronic change or atelectasis although difficult to exclude potential aspiration or developing pneumonia in the right clinical setting.  No focal " consolidation seen. Low grade temp (99.8) on presentation, BP stable. Patient received 80 mg IV lasix in ED. Patient admitted to [facility] for further care.       Hospital Course:  CXR reports possible aspiration Pneumonia. Start Rocephin and Flagyl       2/28 TSH has been chronically low since 2017 with no meds on record. Start synthroid. Repeat CXR in AM, PT/OT  3/1 switch Lasix to PO, replace K for possible d/c tomorrow  3/2 discharge today    Interval History: awake and alert, family by bedside.   CXR reported improving pneumonia. Switch Rocephin/ Flagyl to Augmentin for discharge   Blood cx NGTD,    Possible discharge today on po antibiotics- Augmentin   Continue potassium replacement    Review of Systems   Constitutional: Negative for chills and fever.   Respiratory: Negative for cough, shortness of breath and wheezing.    Cardiovascular: Negative for chest pain and palpitations.   Gastrointestinal: Negative for abdominal pain, nausea and vomiting.   Genitourinary: Negative for dysuria, flank pain and hematuria.   Musculoskeletal: Negative for back pain.   Neurological: Negative for weakness.   Psychiatric/Behavioral: Negative for dysphoric mood.     Objective:     Vital Signs (Most Recent):  Temp: 96.6 °F (35.9 °C) (03/02/19 0311)  Pulse: 66 (03/02/19 0400)  Resp: 20 (03/02/19 0311)  BP: (!) 153/67 (03/02/19 0311)  SpO2: (!) 93 % (03/02/19 0343) Vital Signs (24h Range):  Temp:  [96.6 °F (35.9 °C)-102 °F (38.9 °C)] 96.6 °F (35.9 °C)  Pulse:  [61-75] 66  Resp:  [14-24] 20  SpO2:  [92 %-96 %] 93 %  BP: (144-167)/(67-71) 153/67     Weight: 83.7 kg (184 lb 8.4 oz)  Body mass index is 28.06 kg/m².    Intake/Output Summary (Last 24 hours) at 3/2/2019 0703  Last data filed at 3/2/2019 0544  Gross per 24 hour   Intake 500 ml   Output 1310 ml   Net -810 ml      Physical Exam   Constitutional: He is oriented to person, place, and time. He appears well-developed and well-nourished. No distress.   HENT:    Head: Normocephalic and atraumatic.   Neck: Neck supple. No JVD present.   Cardiovascular: Normal rate, regular rhythm and intact distal pulses.   No murmur heard.  Pulmonary/Chest: Effort normal and breath sounds normal. No respiratory distress. He has no wheezes.   Abdominal: Soft. Bowel sounds are normal. He exhibits no distension. There is no tenderness. There is no guarding.   Musculoskeletal: Normal range of motion. He exhibits no edema or tenderness.   Neurological: He is alert and oriented to person, place, and time.   Skin: Skin is warm and dry. Capillary refill takes less than 2 seconds. No erythema.   Psychiatric: He has a normal mood and affect. His behavior is normal.       Significant Labs:   Blood Culture:   No results for input(s): LABBLOO in the last 48 hours.  CBC:   Recent Labs   Lab 02/28/19  0734 03/01/19  0808 03/02/19  0601   WBC 10.96 8.54 8.37   HGB 10.9* 10.2* 10.5*   HCT 32.6* 30.5* 32.4*    207 247     CMP:   Recent Labs   Lab 02/28/19  0734 03/01/19  0808   * 131*  131*   K 3.0* 3.4*  3.4*   CL 96 94*  94*   CO2 31* 29  29   * 131*  130*   BUN 20 24  24   CREATININE 1.4 1.3  1.3   CALCIUM 8.5* 8.3*  8.4*   ANIONGAP 6* 8  8   EGFRNONAA 43* 47*  47*     Lactic Acid:   No results for input(s): LACTATE in the last 48 hours.  Magnesium: No results for input(s): MG in the last 48 hours.  Prealbumin: No results for input(s): PREALBUMIN in the last 48 hours.  TSH:   Recent Labs   Lab 02/22/19  0932   TSH 4.356*       Significant Imaging: none    Assessment/Plan:      * Aspiration pneumonia    CXR reports possible aspiration  Rocephin and Flagyl  SLP       Acquired hypothyroidism    Chronic with no meds on home meds  Start Synthroid and monitor       Hypokalemia    replace     Acute conjunctivitis of both eyes    Bilateral eye discharge  erythromycin ointment       Acute diastolic congestive heart failure    -continue lasix 40 mg BID   -strict intake/output and  daily weights   -cardiac  Diet   -echo  -cardiology consulted per ED  -monitor tele        CKD (chronic kidney disease) stage 3, GFR 30-59 ml/min    BUN/CR 20/1.3   Monitor renal function   Avoid nephrotoxic meds        Hypertension associated with diabetes    Hyperlipidemia   PAD    -188   Continue amlodipine, metoprolol, ASA/statin      MICHELLE on CPAP    CPAP per home settings        Cerebrovascular accident (CVA)    Continue asa/statin        Paroxysmal atrial fibrillation    Continue  Amiodarone and eliquis  Monitor tele        Type 2 diabetes, controlled, with neuropathy    A1C 6.8   Hold po medications   SSI   Low dose SSI   Accucheck AC&HS        Nonrheumatic aortic valve stenosis    Chronic        COPD (chronic obstructive pulmonary disease)    Chronic   Continue supplemental O2, wean as tolerated   Pt not on home O2        Bilateral carotid artery disease    Chronic   Taking ASA/statin        AAA (abdominal aortic aneurysm)    Chronic         VTE Risk Mitigation (From admission, onward)        Ordered     apixaban tablet 2.5 mg  2 times daily      02/27/19 0603     IP VTE HIGH RISK PATIENT  Once      02/26/19 2118              Robyn Pacheco MD  Department of Hospital Medicine   [facility]

## 2019-03-02 NOTE — PLAN OF CARE
Problem: Adult Inpatient Plan of Care  Goal: Plan of Care Review  Outcome: Ongoing (interventions implemented as appropriate)  1915H Patient is awake and orientedx4. Care plan explained and verbalized understanding. Daughter at bedside. VIP care model explained. Both eyes with yellowish crusty drainage, less than yesterday; positive for redness. On 2lpm/nasal cannula, O2 saturation 95%. On heart monitor running Sinus Bradycardia with First Degree AV Block at 57. IV site to left forearm 20G, flushed and saline locked. External urinary catheter (condom catheter) in placed; draining clear yellow urine. Compression stockings on. Non-skid socks on. Maintained on low salt, low fat diet. No complaints of pain or shortness of breath. Able to turn himself in bed. Maintained on fall precaution. Bed in lowest position, bed alarms on, call light within reach and instructed to call for help when needed. Grandson remain at bedside. Daughter remain at bedside. Will continue  to monitor.

## 2019-03-02 NOTE — PLAN OF CARE
[place] contacted [facility], [place], [phone], [place] sent orders via [email or web address].  Nurse will see pt on [place] 3/3/19    No DME ordered    Future Appointments   Date Time Provider Department Center   3/18/2019 11:00 AM Nayeli Villagomez MD [facility]   5/8/2019  1:30 PM Merritt Short MD [facility] Derrell Caro       Pt's nurse will go over medications/signs and symptoms prior to discharge       03/02/19 1623   Final Note   Assessment Type Final Discharge Note   Anticipated Discharge Disposition Home-Health  ([facility])   What phone number can be called within the next 1-3 days to see how you are doing after discharge? [phone]   Hospital Follow Up  Appt(s) scheduled? No  (Offices closed for weekend. Patient to schedule own follow up appointment.)   Right Care Referral Info   Post Acute Recommendation Home-care     [place], RN Transitional Navigator  [phone]

## 2019-03-02 NOTE — PROGRESS NOTES
Progress Note            [facility]    Subjective:Patient still having dyspnea with no improvement form yesterday. He still having some orthopnea. No chest pain.       Review of patient's allergies indicates:   Allergen Reactions    Iodinated contrast- oral and iv dye     Lisinopril Swelling          amiodarone  200 mg Oral QHS    amLODIPine  10 mg Oral Daily    amoxicillin-clavulanate 875-125mg  1 tablet Oral Q12H    apixaban  2.5 mg Oral BID    aspirin  81 mg Oral Daily    atorvastatin  40 mg Oral Daily    cetirizine  10 mg Oral Daily    docusate sodium  100 mg Oral BID    erythromycin   Right Eye QHS    famotidine  20 mg Oral BID    furosemide  40 mg Intravenous Once    furosemide  40 mg Oral BID    hydrALAZINE  25 mg Oral Q8H    levothyroxine  50 mcg Oral Before breakfast    metoprolol tartrate  50 mg Oral BID    potassium chloride 10%  40 mEq Oral BID    tamsulosin  0.8 mg Oral QHS           acetaminophen, acetaminophen, albuterol-ipratropium, dextrose 50%, dextrose 50%, glucagon (human recombinant), glucose, glucose, hydrALAZINE, insulin aspart U-100, magnesium hydroxide 400 mg/5 ml, ondansetron, sodium chloride 0.9%    Vitals:    03/02/19 0343 03/02/19 0400 03/02/19 0816 03/02/19 0838   BP:    (!) 152/63   BP Location:    Right arm   Patient Position:    Lying   Pulse:  66 68 63   Resp:    15   Temp:    100 °F (37.8 °C)   TempSrc:    Oral   SpO2: (!) 93%   (!) 94%   Weight:       Height:           Physical Examination:  General Appearance: No acute distress  Mental: Alert to person, time and place  HEENT: Perrl  Chest: No pain with palpitations, no chest deformities  Heart: RRR, no murmurs, no gallops or rubs  Lung: coarse breath sounds bilaterally  ABDOMEN: Soft, nontender, nondistended with good bowel sounds heard. No mass or hepatosplenomegaly  EXTREMITIES: Without cyanosis, clubbing or edema.   NEUROLOGICAL: Gross nonfocal. Skin: Warm and dry without any  rash. There is no costovertebral angle tenderness.   Skin: no rashes lesions or ulcers    Lab Results   Component Value Date     (L) 03/02/2019    K 4.3 03/02/2019    CL 97 03/02/2019    CO2 28 03/02/2019    BUN 23 03/02/2019    CREATININE 1.3 03/02/2019     (H) 03/02/2019    HGBA1C 6.8 (H) 02/22/2019    MG 2.3 05/29/2017    AST 95 (H) 02/26/2019     (H) 02/26/2019    ALBUMIN 3.5 02/26/2019    PROT 7.0 02/26/2019    BILITOT 1.3 (H) 02/26/2019    WBC 8.37 03/02/2019    HGB 10.5 (L) 03/02/2019    HCT 32.4 (L) 03/02/2019    MCV 93 03/02/2019     03/02/2019    INR 1.0 05/02/2017    PSA 0.65 12/04/2012    TSH 4.356 (H) 02/22/2019    CHOL 146 02/22/2019    HDL 49 02/22/2019    LDLCALC 80.2 02/22/2019    TRIG 84 02/22/2019       No results for input(s): CPK, CPKMB, TROPONINI, MB in the last 24 hours.    Recent Labs   Lab 02/27/19  1151   TROPONINI 0.020         Lab Results   Component Value Date    TSH 4.356 (H) 02/22/2019       Lab Results   Component Value Date    HGBA1C 6.8 (H) 02/22/2019           Images and labs reviewed    ECG: NSR      Assessment/Plan    1. Acute decompensated Diastolic CHF- continue lasix 40 mg po bid. Lasix 40 mg IV x1 today.  2. PAF now in NSR- continue Eliquis and metoprolol  3. HTN- controlled.     Gay Pemberton MD  [facility]

## 2019-03-02 NOTE — PT/OT/SLP EVAL
Occupational Therapy   Evaluation/tx    Name: Aleksandr Schultz  MRN: [id number]  Admitting Diagnosis:  Aspiration pneumonia      Recommendations:     Discharge Recommendations:    Discharge Equipment Recommendations:  none  Barriers to discharge:  None    Assessment:   Pt presents w/ decreased overall endurance/conditioning, balance/mobility & coordination w/ subsequent decline in (I)/safety w/ BADLs, fxnl mobility & fxnl t/f's.  OT 5x/wk to increase phys/fxnl status & maximize potential to achieve established goals for d/c-->home & no DME needs.    Aleksandr Schultz is a 89 y.o. male with a medical diagnosis of Aspiration pneumonia.  He presents with . Performance deficits affecting function: weakness, impaired endurance, gait instability, impaired functional mobilty, impaired self care skills, impaired balance, decreased coordination, decreased lower extremity function, decreased safety awareness, pain, edema, impaired cardiopulmonary response to activity.      Rehab Prognosis: Good; patient would benefit from acute skilled OT services to address these deficits and reach maximum level of function.       Plan:     Patient to be seen 5 x/week to address the above listed problems via self-care/home management, therapeutic activities, therapeutic exercises  · Plan of Care Expires: 04/02/19  · Plan of Care Reviewed with: patient, daughter    Subjective     Chief Complaint: SOB  Patient/Family Comments/goals: return [facility] at [facility]    Occupational Profile:  Living Environment: w/ bedbound spouse in H w/ 0STE; WIS w/ GBs  Previous level of function: MI via rollator  Roles and Routines: cares for bedbound wife; IADLs; driving  Equipment Used at Home:  CPAP, nebulizer, bedside commode, wheelchair, cane, straight, grab bar, rollator  Assistance upon Discharge: by dgtr (lives next door)    Pain/Comfort:  · Pain Rating 1: (unrated)  · Location - Orientation 1: lower  · Location 1: back  · Pain Addressed 1: Reposition,  Distraction  · Pain Rating Post-Intervention 1: (unrated)  · Pain Rating 2: (unrated)  · Location - Side 2: Bilateral  · Location 2: hip  · Pain Addressed 2: Reposition, Distraction  · Pain Rating Post-Intervention 2: (unrated)    Patients cultural, spiritual, Spiritism conflicts given the current situation:      Objective:     Communicated with: nsdavid prior to session.  Patient found HOB elevated with telemetry, SCD, Condom Catheter, oxygen upon OT entry to room.    General Precautions: Standard, fall, contact, hearing impaired   Orthopedic Precautions:N/A   Braces: N/A     Occupational Performance:    Bed Mobility:    · Patient completed Supine to Sit with minimum assistance    Functional Mobility/Transfers:  · Patient completed Sit <> Stand Transfer with minimum assistance  with  rolling walker   · Patient completed Bed <> Chair Transfer using Step Transfer technique with minimum assistance with rolling walker  · Functional Mobility: via RW for short dist w/in room w/ CGA    Activities of Daily Living:  · Grooming: contact guard assistance face washing in standing  · Lower Body Dressing: stand by assistance and contact guard assistance for sitting balance at EOB to doff/don socks    Cognitive/Visual Perceptual:  Grossly WFL    Physical Exam:  B UEs WFL at 4/5    Sit balance: F to F+  Stand balance: F-    AMPAC 6 Click ADL:  AMPAC Total Score: 18    Treatment & Education:  Pt found in SF & agreeable to OT eval/tx this AM Pt lives w/ bedbound spouse (dgtr next door) in SSH w/ 0STE; WIS w/ GBs & blt in seat. PLOF: MI via rollator in home (& SPC outside home) w/ all fxnl tasks incl IADLs, driving & caring for disabled spouse. Vanessa pt on 2LNC at 92% at rest & perf the following: sup-->EOB w/ Min A & HOB fully elevated. Charito'd static sit x ~5 for doff/don socks at EOB w/ CG-SBA for dyn balance. Pt removed NC & sat in low 90s throughout, but w/ noted wheezing w/ min exertion. Standing via RW w/ Min A to achieve &  CGA to maint throughout. Face washing at sink w/ CGA for standing balance x ~3min; amb via RW w/ CGA-->b/s chair. Pt O2 sat at 92% on RA at tx termination. Edu/tx re: deep/PLBing techs, energy conservation techs, need for 24hr Sup temporarily & HEP. Pt/dgtr verbalized understanding. Pt left UIC w/ PT. Nsg notified of pt's respiratory status.    Patient left up in chair with chair alarm on and PT & dgtr present    GOALS:   Multidisciplinary Problems     Occupational Therapy Goals        Problem: Occupational Therapy Goal    Goal Priority Disciplines Outcome Interventions   Occupational Therapy Goal     OT, PT/OT Ongoing (interventions implemented as appropriate)    Description:  Goals to be met by: 04/     Patient will increase functional independence with ADLs by performing:    LE Dressing with Supervision.  Grooming while standing at sink with Supervision.  Toileting from toilet with Supervision for hygiene and clothing management.   Toilet transfer to toilet with Supervision.  Increased functional strength to WFL for ADLs.                      History:     Past Medical History:   Diagnosis Date    Aortic aneurysm     Asthma, chronic     Chronic kidney disease     CKD (chronic kidney disease)     COPD (chronic obstructive pulmonary disease)     Coronary artery disease     Diabetes mellitus     Glaucoma (increased eye pressure)     Hypertension     Peripheral vascular disease     Prostate cancer     Sleep apnea     cpap    Small bowel obstruction     Status post balloon angioplasty of pulmonary artery branches     1980    Stroke     1991       Past Surgical History:   Procedure Laterality Date    BACK SURGERY      x2 lumbar    CAROTID ARTERY ANGIOPLASTY      COLONOSCOPY N/A 3/25/2013    Performed by [facility] at [facility] (4TH FLR)    HERNIA REPAIR      PI OU      laser PI OU    SHOULDER SURGERY      rotator bilateral       Time Tracking:     OT Date of Treatment: 03/02/19  OT Start  Time: 1032  OT Stop Time: 1100  OT Total Time (min): 28 min    Billable Minutes:Evaluation 10  Self Care/Home Management 18  Total Time 28    REED Wilson  3/2/2019

## 2019-03-02 NOTE — PT/OT/SLP PROGRESS
Physical Therapy Treatment    Patient Name:  Aleksandr Schultz   MRN:  [id number]    Recommendations:     Discharge Recommendations:  (HHPT)   Discharge Equipment Recommendations: other (see comments)(Rollator)   Barriers to discharge: Decreased caregiver support and and impaired functional mobility at this time    Assessment:     Aleksandr Schultz is a 89 y.o. male admitted with a medical diagnosis of Aspiration pneumonia.  He presents with the following impairments/functional limitations:  weakness, impaired self care skills, impaired balance, decreased coordination, decreased ROM, impaired skin, impaired joint extensibility, impaired endurance, impaired functional mobilty, decreased upper extremity function, pain, gait instability, decreased lower extremity function, impaired cardiopulmonary response to activity. Pt still demonstrates significant functional mobility needing min A with gait for 25ft c/ RW; significant activity intolerance needing rest breaks c/ functional activity.    Rehab Prognosis: Good; patient would benefit from acute skilled PT services to address these deficits and reach maximum level of function.    Recent Surgery: * No surgery found *      Plan:     During this hospitalization, patient to be seen 5 x/week to address the identified rehab impairments via gait training, therapeutic activities, therapeutic exercises and progress toward the following goals:    · Plan of Care Expires:  04/01/19    Subjective     Chief Complaint: pain in back.  Patient/Family Comments/goals: agreed to PT visit  Pain/Comfort:  · Pain Rating 1: 8/10  · Location - Orientation 1: lower  · Location 1: back  · Pain Addressed 1: Reposition, Distraction, Nurse notified  · Pain Rating Post-Intervention 1: 5/10      Objective:     Communicated with EDSON Alonso prior to session.  Patient found all lines intact, call button in reach, bed alarm on and daughter present telemetry, SCD, Condom Catheter, oxygen  upon PT entry to room.      General Precautions: Standard, fall, contact, hearing impaired   Orthopedic Precautions:N/A   Braces: N/A     Functional Mobility:  · Bed Mobility:     · Rolling Right: minimum assistance  · Transfers:     · Sit to Stand:  minimum assistance with rolling walker  · Bed to Chair: minimum assistance with  rolling walker  using  Stand Pivot and extra time needed d/t impaired endurance  · Gait: 25ft c/ functional rest stops to train activity tolerance   · Balance: Dynamic gait; Fair-      AM-PAC 6 CLICK MOBILITY  Turning over in bed (including adjusting bedclothes, sheets and blankets)?: 3  Sitting down on and standing up from a chair with arms (e.g., wheelchair, bedside commode, etc.): 3  Moving from lying on back to sitting on the side of the bed?: 3  Moving to and from a bed to a chair (including a wheelchair)?: 3  Need to walk in hospital room?: 2  Climbing 3-5 steps with a railing?: 2  Basic Mobility Total Score: 16       Therapeutic Activities and Exercises:  Instructed in functional transfer supine > sitting EOB,. Sitting tolerance on RA c/ 02 sat 92-93% x 5min, sit<>Stand transfer EOB  at min A c/ RW, standing postural control training c/ vc for upright posture and energy conservation techniques, gait on level surface c/ vc to correct LIYAH; advancement of RW; transitional stand>sit and sitting postioning at min A in recliner. PT donned on pt's NC post transfer maintaining 02sat at 92%.    Patient left up in chair with all lines intact, call button in reach, chair alarm on and daughter present.    GOALS:   Multidisciplinary Problems     Physical Therapy Goals        Problem: Physical Therapy Goal    Goal Priority Disciplines Outcome Goal Variances Interventions   Physical Therapy Goal     PT, PT/OT Ongoing (interventions implemented as appropriate)     Description:  Goals to be met by: Discharge     Patient will increase functional independence with mobility by performin. Supine to sit with Set-up  [place]  2. Sit to stand transfer with Mod C/ RW   3. Bed to chair transfer with Modified [place] using Rolling Walker  4. Gait  x 100ft+ feet with Modified [place] using Rolling Walker.                       Time Tracking:     PT Received On: 03/02/19  PT Start Time: 1100     PT Stop Time: 1120  PT Total Time (min): 20 min     Billable Minutes: Therapeutic Activity 20    Treatment Type: Treatment  PT/PTA: PT           Alex Davis, PT  03/02/2019

## 2019-03-02 NOTE — PLAN OF CARE
Problem: Adult Inpatient Plan of Care  Goal: Plan of Care Review  Patient on oxygen with documented flow.  Will continue to monitor.

## 2019-03-02 NOTE — PLAN OF CARE
Problem: Occupational Therapy Goal  Goal: Occupational Therapy Goal  Goals to be met by: 04/     Patient will increase functional independence with ADLs by performing:    LE Dressing with Supervision.  Grooming while standing at sink with Supervision.  Toileting from toilet with Supervision for hygiene and clothing management.   Toilet transfer to toilet with Supervision.  Increased functional strength to WFL for ADLs.    Outcome: Ongoing (interventions implemented as appropriate)  Pt found in SF & agreeable to OT eval/tx this AM Pt lives w/ bedbound spouse (dgtr next door) in SSH w/ 0STE; WIS w/ GBs & blt in seat. PLOF: MI via rollator in home (& SPC outside home) w/ all fxnl tasks incl IADLs, driving & caring for disabled spouse. Vanessa, pt on 2LNC at 92% at rest & perf the following: sup-->EOB w/ Min A & HOB fully elevated. Charito'd static sit x ~5 for doff/don socks at EOB w/ CG-SBA for dyn balance. Pt removed NC & sat in low 90s throughout, but w/ noted wheezing w/ min exertion. Standing via RW w/ Min A to achieve & CGA to maint throughout. Face washing at sink w/ CGA for standing balance x ~3min; amb via RW w/ CGA-->b/s chair. Pt O2 sat at 92% on RA at tx termination. Edu/tx re: deep/PLBing techs, energy conservation techs, need for 24hr Sup temporarily & HEP. Pt/dgtr verbalized understanding. Pt left UIC w/ PT. Nsg notified of pt's respiratory status.    Pt presents w/ decreased overall endurance/conditioning, balance/mobility & coordination w/ subsequent decline in (I)/safety w/ BADLs, fxnl mobility & fxnl t/f's.  OT 5x/wk to increase phys/fxnl status & maximize potential to achieve established goals for d/c-->home & no DME needs.

## 2019-03-02 NOTE — PROGRESS NOTES
"  TN contacted pt's daughter, José Miguel Westbrook, [phone], regarding HH agency preference. Ms Westbrook stated "It did not matter as long as they go to [facility] and they accept [employer]."    TN sent HH referrals via [employer] to:    [facility]  [facility]- Declined  [facility]- Declined  [facility] [place]- Declined  [facility] [place]- Declined  [facility] [place]- Declined    TN awaiting reply from agencies  "

## 2019-03-02 NOTE — PLAN OF CARE
Problem: Physical Therapy Goal  Goal: Physical Therapy Goal  Goals to be met by: Discharge     Patient will increase functional independence with mobility by performin. Supine to sit with Set-up [place]  2. Sit to stand transfer with Mod C/ RW   3. Bed to chair transfer with Modified [place] using Rolling Walker  4. Gait  x 100ft+ feet with Modified [place] using Rolling Walker.      Outcome: Ongoing (interventions implemented as appropriate)  PT visit completed; still impaired activity tolerance; gait 25ft at min A; HHPT

## 2019-03-02 NOTE — PLAN OF CARE
VN cued into patients room for DC instructions, family at bedside. Pt family member verbalized understanding of DC instructions, including follow up appts, new meds and clinical references. Pt informed that HH agency will reach out to patient/family once set up by the CM. Family verbalize understanding. Bedside RN to removed condom cath, PIV, and tele before DC. Transport requested.

## 2019-03-02 NOTE — SUBJECTIVE & OBJECTIVE
Interval History: awake and alert, family by bedside.   CXR reported improving pneumonia. Switch Rocephin/ Flagyl to Augmentin for discharge   Blood cx NGTD,    Possible discharge today on po antibiotics- Augmentin   Continue potassium replacement    Review of Systems   Constitutional: Negative for chills and fever.   Respiratory: Negative for cough, shortness of breath and wheezing.    Cardiovascular: Negative for chest pain and palpitations.   Gastrointestinal: Negative for abdominal pain, nausea and vomiting.   Genitourinary: Negative for dysuria, flank pain and hematuria.   Musculoskeletal: Negative for back pain.   Neurological: Negative for weakness.   Psychiatric/Behavioral: Negative for dysphoric mood.     Objective:     Vital Signs (Most Recent):  Temp: 96.6 °F (35.9 °C) (03/02/19 0311)  Pulse: 66 (03/02/19 0400)  Resp: 20 (03/02/19 0311)  BP: (!) 153/67 (03/02/19 0311)  SpO2: (!) 93 % (03/02/19 0343) Vital Signs (24h Range):  Temp:  [96.6 °F (35.9 °C)-102 °F (38.9 °C)] 96.6 °F (35.9 °C)  Pulse:  [61-75] 66  Resp:  [14-24] 20  SpO2:  [92 %-96 %] 93 %  BP: (144-167)/(67-71) 153/67     Weight: 83.7 kg (184 lb 8.4 oz)  Body mass index is 28.06 kg/m².    Intake/Output Summary (Last 24 hours) at 3/2/2019 0703  Last data filed at 3/2/2019 0544  Gross per 24 hour   Intake 500 ml   Output 1310 ml   Net -810 ml      Physical Exam   Constitutional: He is oriented to person, place, and time. He appears well-developed and well-nourished. No distress.   HENT:   Head: Normocephalic and atraumatic.   Neck: Neck supple. No JVD present.   Cardiovascular: Normal rate, regular rhythm and intact distal pulses.   No murmur heard.  Pulmonary/Chest: Effort normal and breath sounds normal. No respiratory distress. He has no wheezes.   Abdominal: Soft. Bowel sounds are normal. He exhibits no distension. There is no tenderness. There is no guarding.   Musculoskeletal: Normal range of motion. He exhibits no edema or tenderness.    Neurological: He is alert and oriented to person, place, and time.   Skin: Skin is warm and dry. Capillary refill takes less than 2 seconds. No erythema.   Psychiatric: He has a normal mood and affect. His behavior is normal.       Significant Labs:   Blood Culture:   No results for input(s): LABBLOO in the last 48 hours.  CBC:   Recent Labs   Lab 02/28/19  0734 03/01/19  0808 03/02/19  0601   WBC 10.96 8.54 8.37   HGB 10.9* 10.2* 10.5*   HCT 32.6* 30.5* 32.4*    207 247     CMP:   Recent Labs   Lab 02/28/19  0734 03/01/19  0808   * 131*  131*   K 3.0* 3.4*  3.4*   CL 96 94*  94*   CO2 31* 29  29   * 131*  130*   BUN 20 24  24   CREATININE 1.4 1.3  1.3   CALCIUM 8.5* 8.3*  8.4*   ANIONGAP 6* 8  8   EGFRNONAA 43* 47*  47*     Lactic Acid:   No results for input(s): LACTATE in the last 48 hours.  Magnesium: No results for input(s): MG in the last 48 hours.  Prealbumin: No results for input(s): PREALBUMIN in the last 48 hours.  TSH:   Recent Labs   Lab 02/22/19  0932   TSH 4.356*       Significant Imaging: none

## 2019-03-03 NOTE — PLAN OF CARE
Problem: Adult Inpatient Plan of Care  Goal: Plan of Care Review  Outcome: Ongoing (interventions implemented as appropriate)  Patient safety maintained. Bed in low position, wheels locked, alarm set up. Assistance provided for ADL's as needed. Administered medications as ordered. Extra dose of lasix given IV per MD order. Physical and occupational therapy working with the patient as tolerated. Printed documentation provided to the patient on discharge. Telemetry monitor removed and returned to the monitor room. Education given by virtual nurse on discharge.

## 2019-03-03 NOTE — PROGRESS NOTES
3/3/19 @ 2:23 pm, [place] received call from Lady Priscilla of the [facility], she had not received orders via [facility], [place] faxed to [phone]

## 2019-03-04 LAB — BACTERIA BLD CULT: NORMAL

## 2019-03-04 NOTE — PLAN OF CARE
A&O x2, d/o time, situation. Pt is frustrated with cares overnight. VSS on RA; /55. A2, lift. T/R. Incont at times.  LS dim. IVF infusing. Mid back and LLE dressing intact. +2 edema L hip, LLE. Large bruise to L hip. IV Vanco and Zosyn. MD notified of S. aureus result for pos BC. SW, OT, PT follow. Discharge to TCU when afebrile, on PO abx.   Problem: Adult Inpatient Plan of Care  Goal: Plan of Care Review  Outcome: Ongoing (interventions implemented as appropriate)   02/27/19 8778   Plan of Care Review   Plan of Care Reviewed With patient;daughter   Patient  Is awake, alert, and orient. Patient is on tele, no ectopy noted. Patient denies pain or discomfort at this time.  Monitoring patient BG ac/hs and covered per sliding scale. Patient received Tylenol for temp, pt also has Tylenol suppository PRN. Patient bed alarm is on, bed in the lowest position, and call light within reach.

## 2019-03-18 ENCOUNTER — PATIENT MESSAGE (OUTPATIENT)
Dept: CARDIOLOGY | Facility: CLINIC | Age: 84
End: 2019-03-18

## 2019-05-08 ENCOUNTER — OFFICE VISIT (OUTPATIENT)
Dept: OPHTHALMOLOGY | Facility: CLINIC | Age: 84
End: 2019-05-08
Payer: MEDICARE

## 2019-05-08 DIAGNOSIS — Z96.1 STATUS POST CATARACT EXTRACTION AND INSERTION OF INTRAOCULAR LENS OF RIGHT EYE: ICD-10-CM

## 2019-05-08 DIAGNOSIS — H04.123 BILATERAL DRY EYES: ICD-10-CM

## 2019-05-08 DIAGNOSIS — Z98.41 STATUS POST CATARACT EXTRACTION AND INSERTION OF INTRAOCULAR LENS OF RIGHT EYE: ICD-10-CM

## 2019-05-08 DIAGNOSIS — H02.112 CICATRICIAL ECTROPION OF RIGHT LOWER EYELID: ICD-10-CM

## 2019-05-08 DIAGNOSIS — E11.3293 MILD NONPROLIFERATIVE DIABETIC RETINOPATHY OF BOTH EYES WITHOUT MACULAR EDEMA ASSOCIATED WITH TYPE 2 DIABETES MELLITUS: Primary | ICD-10-CM

## 2019-05-08 DIAGNOSIS — H25.12 NUCLEAR SCLEROSIS OF LEFT EYE: ICD-10-CM

## 2019-05-08 DIAGNOSIS — H35.351 CME (CYSTOID MACULAR EDEMA), RIGHT: ICD-10-CM

## 2019-05-08 DIAGNOSIS — H02.132 SENILE ECTROPION OF RIGHT LOWER EYELID: ICD-10-CM

## 2019-05-08 PROBLEM — H10.33 ACUTE CONJUNCTIVITIS OF BOTH EYES: Status: RESOLVED | Noted: 2019-02-27 | Resolved: 2019-05-08

## 2019-05-08 PROCEDURE — 92014 COMPRE OPH EXAM EST PT 1/>: CPT | Mod: S$GLB,,, | Performed by: OPHTHALMOLOGY

## 2019-05-08 PROCEDURE — 92014 PR EYE EXAM, EST PATIENT,COMPREHESV: ICD-10-PCS | Mod: S$GLB,,, | Performed by: OPHTHALMOLOGY

## 2019-05-08 PROCEDURE — 99999 PR PBB SHADOW E&M-EST. PATIENT-LVL II: CPT | Mod: PBBFAC,,, | Performed by: OPHTHALMOLOGY

## 2019-05-08 PROCEDURE — 92134 POSTERIOR SEGMENT OCT RETINA (OCULAR COHERENCE TOMOGRAPHY)-BOTH EYES: ICD-10-PCS | Mod: S$GLB,,, | Performed by: OPHTHALMOLOGY

## 2019-05-08 PROCEDURE — 92134 CPTRZ OPH DX IMG PST SGM RTA: CPT | Mod: S$GLB,,, | Performed by: OPHTHALMOLOGY

## 2019-05-08 PROCEDURE — 99999 PR PBB SHADOW E&M-EST. PATIENT-LVL II: ICD-10-PCS | Mod: PBBFAC,,, | Performed by: OPHTHALMOLOGY

## 2019-05-08 RX ORDER — AZITHROMYCIN 250 MG/1
TABLET, FILM COATED ORAL
Refills: 0 | COMMUNITY
Start: 2019-05-06 | End: 2019-09-03 | Stop reason: ALTCHOICE

## 2019-05-08 NOTE — PROGRESS NOTES
Assessment /Plan     For exam results, see Encounter Report.    Status post cataract extraction and insertion of intraocular lens of right eye    Bilateral dry eyes - Both Eyes    Nuclear sclerosis of left eye    Cicatricial ectropion of right lower eyelid    CME (cystoid macular edema), right  -     Posterior Segment OCT Retina-Both eyes    Senile ectropion of right lower eyelid        Patient with daughter  --> daughter  suddenly 2017 MI playing cards  --> other daughter at [facility] double hit lymphoma --> BM stem cell Rx    Wife 88 yo  Bedridden x 7 years with AD  Caretaker      NSC OS - CE PRN as discussed  + FLOMAX use with fair - poor dilation  Patient happy with Va QOL  Patient defers CE option    PC IOL OD  Stable        NIDDM  Mild / Trace BDR  No CSME  Control  OCT 2019 --> no CME / Dry      RLL Ectropion / Lid laxity  Discussed options  Minimal exposure  Dry Eye Syndrome: discussed use of warm compresses, preserved & non-preserved artificial tears, gel and PM ointment options.  Also discussed options utilizing medications.         Plan  RTC 4-6 months for IOP --> Ectropion check  RTC Sooner prn with good understanding

## 2019-05-29 ENCOUNTER — TELEPHONE (OUTPATIENT)
Dept: CARDIOLOGY | Facility: CLINIC | Age: 84
End: 2019-05-29

## 2019-05-29 DIAGNOSIS — I50.9 ACUTE ON CHRONIC CONGESTIVE HEART FAILURE, UNSPECIFIED HEART FAILURE TYPE: ICD-10-CM

## 2019-05-29 DIAGNOSIS — I65.23 BILATERAL CAROTID ARTERY STENOSIS: ICD-10-CM

## 2019-05-29 DIAGNOSIS — E11.59 HYPERTENSION ASSOCIATED WITH DIABETES: ICD-10-CM

## 2019-05-29 DIAGNOSIS — I15.2 HYPERTENSION ASSOCIATED WITH DIABETES: ICD-10-CM

## 2019-05-29 DIAGNOSIS — E11.40 TYPE 2 DIABETES, CONTROLLED, WITH NEUROPATHY: ICD-10-CM

## 2019-05-29 DIAGNOSIS — E87.1 HYPONATREMIA: Primary | ICD-10-CM

## 2019-05-29 DIAGNOSIS — N18.30 CKD (CHRONIC KIDNEY DISEASE) STAGE 3, GFR 30-59 ML/MIN: ICD-10-CM

## 2019-05-29 DIAGNOSIS — E78.2 MIXED HYPERLIPIDEMIA: ICD-10-CM

## 2019-05-29 RX ORDER — FUROSEMIDE 40 MG/1
40 TABLET ORAL 2 TIMES DAILY
Qty: 180 TABLET | Refills: 3 | Status: SHIPPED | OUTPATIENT
Start: 2019-05-29 | End: 2020-07-15

## 2019-05-29 RX ORDER — POTASSIUM CHLORIDE 750 MG/1
10 CAPSULE, EXTENDED RELEASE ORAL ONCE
Qty: 90 CAPSULE | Refills: 3 | Status: SHIPPED | OUTPATIENT
Start: 2019-05-29 | End: 2019-05-29

## 2019-05-29 RX ORDER — POTASSIUM CHLORIDE 750 MG/1
10 CAPSULE, EXTENDED RELEASE ORAL ONCE
COMMUNITY
End: 2019-05-29 | Stop reason: SDUPTHER

## 2019-05-29 NOTE — TELEPHONE ENCOUNTER
José Miguel notified, verbalized understanding. José Miguel stated that pt does not want to take liquid potassium, Dr. Buckner made aware. Per Dr. Buckner pt can continue potassium 10meq and have bmp in a week. José Miguel notified, verbalized understanding.

## 2019-05-29 NOTE — TELEPHONE ENCOUNTER
José Miguel called stating pt needs a refill on Furosemide and Potassium. José Miguel stated pt is taking Furosemide 40mg bid and Potassium 10meq once a day. When pt was discharged on 3/2/19 he was prescribed potassium chloride 10% (KAYCIEL) 20 mEq/15 mL oral solution and directed to take 30mls (40mEq) daily. Please advise which dosage of potassium pt should be taking and advise.

## 2019-05-31 DIAGNOSIS — E87.1 HYPONATREMIA: ICD-10-CM

## 2019-05-31 DIAGNOSIS — E78.2 MIXED HYPERLIPIDEMIA: ICD-10-CM

## 2019-05-31 DIAGNOSIS — E11.59 HYPERTENSION ASSOCIATED WITH DIABETES: Primary | ICD-10-CM

## 2019-05-31 DIAGNOSIS — I15.2 HYPERTENSION ASSOCIATED WITH DIABETES: Primary | ICD-10-CM

## 2019-05-31 DIAGNOSIS — I50.9 ACUTE ON CHRONIC CONGESTIVE HEART FAILURE, UNSPECIFIED HEART FAILURE TYPE: ICD-10-CM

## 2019-05-31 DIAGNOSIS — N18.30 CKD (CHRONIC KIDNEY DISEASE) STAGE 3, GFR 30-59 ML/MIN: ICD-10-CM

## 2019-05-31 DIAGNOSIS — I65.21 CAROTID ARTERY STENOSIS, ASYMPTOMATIC, RIGHT: ICD-10-CM

## 2019-05-31 RX ORDER — POTASSIUM CHLORIDE 750 MG/1
10 CAPSULE, EXTENDED RELEASE ORAL ONCE
Qty: 90 CAPSULE | Refills: 3 | Status: SHIPPED | OUTPATIENT
Start: 2019-05-31 | End: 2019-05-31

## 2019-05-31 RX ORDER — POTASSIUM CHLORIDE 750 MG/1
10 CAPSULE, EXTENDED RELEASE ORAL ONCE
COMMUNITY
End: 2019-05-31 | Stop reason: SDUPTHER

## 2019-06-03 ENCOUNTER — DOCUMENTATION ONLY (OUTPATIENT)
Dept: CARDIOLOGY | Facility: CLINIC | Age: 84
End: 2019-06-03

## 2019-06-03 RX ORDER — POTASSIUM CHLORIDE 750 MG/1
10 CAPSULE, EXTENDED RELEASE ORAL DAILY
COMMUNITY
End: 2020-10-19

## 2019-06-05 ENCOUNTER — LAB VISIT (OUTPATIENT)
Dept: LAB | Facility: HOSPITAL | Age: 84
End: 2019-06-05
Attending: INTERNAL MEDICINE
Payer: MEDICARE

## 2019-06-05 DIAGNOSIS — N18.30 CKD (CHRONIC KIDNEY DISEASE) STAGE 3, GFR 30-59 ML/MIN: ICD-10-CM

## 2019-06-05 DIAGNOSIS — E11.59 HYPERTENSION ASSOCIATED WITH DIABETES: ICD-10-CM

## 2019-06-05 DIAGNOSIS — E87.1 HYPONATREMIA: ICD-10-CM

## 2019-06-05 DIAGNOSIS — E11.40 TYPE 2 DIABETES, CONTROLLED, WITH NEUROPATHY: ICD-10-CM

## 2019-06-05 DIAGNOSIS — I50.9 ACUTE ON CHRONIC CONGESTIVE HEART FAILURE, UNSPECIFIED HEART FAILURE TYPE: ICD-10-CM

## 2019-06-05 DIAGNOSIS — I65.23 BILATERAL CAROTID ARTERY STENOSIS: ICD-10-CM

## 2019-06-05 DIAGNOSIS — E78.2 MIXED HYPERLIPIDEMIA: ICD-10-CM

## 2019-06-05 DIAGNOSIS — I15.2 HYPERTENSION ASSOCIATED WITH DIABETES: ICD-10-CM

## 2019-06-05 LAB
ANION GAP SERPL CALC-SCNC: 11 MMOL/L (ref 8–16)
BUN SERPL-MCNC: 29 MG/DL (ref 10–30)
CALCIUM SERPL-MCNC: 9.1 MG/DL (ref 8.7–10.5)
CHLORIDE SERPL-SCNC: 100 MMOL/L (ref 95–110)
CO2 SERPL-SCNC: 28 MMOL/L (ref 23–29)
CREAT SERPL-MCNC: 1.7 MG/DL (ref 0.5–1.4)
EST. GFR  (AFRICAN AMERICAN): 40 ML/MIN/1.73 M^2
EST. GFR  (NON AFRICAN AMERICAN): 34 ML/MIN/1.73 M^2
GLUCOSE SERPL-MCNC: 108 MG/DL (ref 70–110)
POTASSIUM SERPL-SCNC: 4.8 MMOL/L (ref 3.5–5.1)
SODIUM SERPL-SCNC: 139 MMOL/L (ref 136–145)

## 2019-06-05 PROCEDURE — 80048 BASIC METABOLIC PNL TOTAL CA: CPT

## 2019-06-05 PROCEDURE — 36415 COLL VENOUS BLD VENIPUNCTURE: CPT

## 2019-06-06 ENCOUNTER — TELEPHONE (OUTPATIENT)
Dept: CARDIOLOGY | Facility: CLINIC | Age: 84
End: 2019-06-06

## 2019-06-06 DIAGNOSIS — E78.2 MIXED HYPERLIPIDEMIA: ICD-10-CM

## 2019-06-06 DIAGNOSIS — I35.0 NONRHEUMATIC AORTIC VALVE STENOSIS: ICD-10-CM

## 2019-06-06 DIAGNOSIS — E11.40 TYPE 2 DIABETES, CONTROLLED, WITH NEUROPATHY: Primary | ICD-10-CM

## 2019-06-06 DIAGNOSIS — I15.2 HYPERTENSION ASSOCIATED WITH DIABETES: ICD-10-CM

## 2019-06-06 DIAGNOSIS — I48.0 PAROXYSMAL ATRIAL FIBRILLATION: ICD-10-CM

## 2019-06-06 DIAGNOSIS — I50.9 ACUTE ON CHRONIC CONGESTIVE HEART FAILURE, UNSPECIFIED HEART FAILURE TYPE: ICD-10-CM

## 2019-06-06 DIAGNOSIS — E11.59 HYPERTENSION ASSOCIATED WITH DIABETES: ICD-10-CM

## 2019-06-06 NOTE — TELEPHONE ENCOUNTER
----- Message from Lynne Bcukner MD sent at 6/6/2019  9:13 AM CDT -----  Please inform the patient that potassium level is fine, but his kidney functions are worse.  I would recommend decrease the dose of LAsix to half of the current dose and the same with potassium supplement.

## 2019-08-13 DIAGNOSIS — I15.2 HYPERTENSION ASSOCIATED WITH DIABETES: ICD-10-CM

## 2019-08-13 DIAGNOSIS — E11.59 HYPERTENSION ASSOCIATED WITH DIABETES: ICD-10-CM

## 2019-08-14 RX ORDER — HYDRALAZINE HYDROCHLORIDE 25 MG/1
25 TABLET, FILM COATED ORAL EVERY 8 HOURS
Qty: 270 TABLET | Refills: 2 | Status: SHIPPED | OUTPATIENT
Start: 2019-08-14 | End: 2020-01-29

## 2019-08-27 ENCOUNTER — LAB VISIT (OUTPATIENT)
Dept: LAB | Facility: HOSPITAL | Age: 84
End: 2019-08-27
Attending: INTERNAL MEDICINE
Payer: MEDICARE

## 2019-08-27 ENCOUNTER — TELEPHONE (OUTPATIENT)
Dept: CARDIOLOGY | Facility: CLINIC | Age: 84
End: 2019-08-27

## 2019-08-27 DIAGNOSIS — E11.59 HYPERTENSION ASSOCIATED WITH DIABETES: ICD-10-CM

## 2019-08-27 DIAGNOSIS — I50.9 ACUTE ON CHRONIC CONGESTIVE HEART FAILURE, UNSPECIFIED HEART FAILURE TYPE: ICD-10-CM

## 2019-08-27 DIAGNOSIS — I48.0 PAROXYSMAL ATRIAL FIBRILLATION: ICD-10-CM

## 2019-08-27 DIAGNOSIS — I15.2 HYPERTENSION ASSOCIATED WITH DIABETES: ICD-10-CM

## 2019-08-27 DIAGNOSIS — E78.2 MIXED HYPERLIPIDEMIA: ICD-10-CM

## 2019-08-27 DIAGNOSIS — I35.0 NONRHEUMATIC AORTIC VALVE STENOSIS: ICD-10-CM

## 2019-08-27 DIAGNOSIS — E11.40 TYPE 2 DIABETES, CONTROLLED, WITH NEUROPATHY: ICD-10-CM

## 2019-08-27 LAB
ALBUMIN SERPL BCP-MCNC: 3.7 G/DL (ref 3.5–5.2)
ALP SERPL-CCNC: 99 U/L (ref 55–135)
ALT SERPL W/O P-5'-P-CCNC: 26 U/L (ref 10–44)
ANION GAP SERPL CALC-SCNC: 11 MMOL/L (ref 8–16)
AST SERPL-CCNC: 28 U/L (ref 10–40)
BILIRUB SERPL-MCNC: 0.4 MG/DL (ref 0.1–1)
BUN SERPL-MCNC: 28 MG/DL (ref 10–30)
CALCIUM SERPL-MCNC: 9.3 MG/DL (ref 8.7–10.5)
CHLORIDE SERPL-SCNC: 102 MMOL/L (ref 95–110)
CHOLEST SERPL-MCNC: 148 MG/DL (ref 120–199)
CHOLEST/HDLC SERPL: 3.1 {RATIO} (ref 2–5)
CO2 SERPL-SCNC: 25 MMOL/L (ref 23–29)
CREAT SERPL-MCNC: 1.6 MG/DL (ref 0.5–1.4)
EST. GFR  (AFRICAN AMERICAN): 43 ML/MIN/1.73 M^2
EST. GFR  (NON AFRICAN AMERICAN): 37 ML/MIN/1.73 M^2
ESTIMATED AVG GLUCOSE: 126 MG/DL (ref 68–131)
GLUCOSE SERPL-MCNC: 123 MG/DL (ref 70–110)
HBA1C MFR BLD HPLC: 6 % (ref 4–5.6)
HDLC SERPL-MCNC: 48 MG/DL (ref 40–75)
HDLC SERPL: 32.4 % (ref 20–50)
LDLC SERPL CALC-MCNC: 85 MG/DL (ref 63–159)
NONHDLC SERPL-MCNC: 100 MG/DL
POTASSIUM SERPL-SCNC: 4.4 MMOL/L (ref 3.5–5.1)
PROT SERPL-MCNC: 7.2 G/DL (ref 6–8.4)
SODIUM SERPL-SCNC: 138 MMOL/L (ref 136–145)
TRIGL SERPL-MCNC: 75 MG/DL (ref 30–150)
TSH SERPL DL<=0.005 MIU/L-ACNC: 3.69 UIU/ML (ref 0.4–4)

## 2019-08-27 PROCEDURE — 80061 LIPID PANEL: CPT

## 2019-08-27 PROCEDURE — 80053 COMPREHEN METABOLIC PANEL: CPT

## 2019-08-27 PROCEDURE — 36415 COLL VENOUS BLD VENIPUNCTURE: CPT

## 2019-08-27 PROCEDURE — 83036 HEMOGLOBIN GLYCOSYLATED A1C: CPT

## 2019-08-27 PROCEDURE — 84443 ASSAY THYROID STIM HORMONE: CPT

## 2019-08-27 NOTE — TELEPHONE ENCOUNTER
----- Message from Lynne Buckner MD sent at 8/27/2019  4:49 PM CDT -----  Please review.  We will discuss the results during your upcoming visit with me. It looks good. CKD is stable

## 2019-09-03 ENCOUNTER — OFFICE VISIT (OUTPATIENT)
Dept: CARDIOLOGY | Facility: CLINIC | Age: 84
End: 2019-09-03
Payer: MEDICARE

## 2019-09-03 ENCOUNTER — HOSPITAL ENCOUNTER (OUTPATIENT)
Dept: RADIOLOGY | Facility: HOSPITAL | Age: 84
Discharge: HOME | End: 2019-09-03
Attending: INTERNAL MEDICINE
Payer: MEDICARE

## 2019-09-03 ENCOUNTER — TELEPHONE (OUTPATIENT)
Dept: CARDIOLOGY | Facility: CLINIC | Age: 84
End: 2019-09-03

## 2019-09-03 VITALS
HEART RATE: 51 BPM | BODY MASS INDEX: 25.19 KG/M2 | HEIGHT: 69 IN | SYSTOLIC BLOOD PRESSURE: 160 MMHG | WEIGHT: 170.06 LBS | DIASTOLIC BLOOD PRESSURE: 72 MMHG

## 2019-09-03 DIAGNOSIS — I50.32 CHRONIC DIASTOLIC (CONGESTIVE) HEART FAILURE: ICD-10-CM

## 2019-09-03 DIAGNOSIS — E03.9 ACQUIRED HYPOTHYROIDISM: ICD-10-CM

## 2019-09-03 DIAGNOSIS — E11.59 HYPERTENSION ASSOCIATED WITH DIABETES: ICD-10-CM

## 2019-09-03 DIAGNOSIS — N18.30 CKD (CHRONIC KIDNEY DISEASE) STAGE 3, GFR 30-59 ML/MIN: ICD-10-CM

## 2019-09-03 DIAGNOSIS — I35.1 NONRHEUMATIC AORTIC VALVE INSUFFICIENCY: ICD-10-CM

## 2019-09-03 DIAGNOSIS — E78.2 MIXED HYPERLIPIDEMIA: ICD-10-CM

## 2019-09-03 DIAGNOSIS — I73.9 PAD (PERIPHERAL ARTERY DISEASE): ICD-10-CM

## 2019-09-03 DIAGNOSIS — I65.23 BILATERAL CAROTID ARTERY STENOSIS: Chronic | ICD-10-CM

## 2019-09-03 DIAGNOSIS — I65.23 BILATERAL CAROTID ARTERY STENOSIS: ICD-10-CM

## 2019-09-03 DIAGNOSIS — G47.33 OSA ON CPAP: ICD-10-CM

## 2019-09-03 DIAGNOSIS — E11.40 TYPE 2 DIABETES, CONTROLLED, WITH NEUROPATHY: ICD-10-CM

## 2019-09-03 DIAGNOSIS — I49.5 SINUS BRADY-TACHY SYNDROME: ICD-10-CM

## 2019-09-03 DIAGNOSIS — J44.9 CHRONIC OBSTRUCTIVE PULMONARY DISEASE, UNSPECIFIED COPD TYPE: Chronic | ICD-10-CM

## 2019-09-03 DIAGNOSIS — I71.40 ABDOMINAL AORTIC ANEURYSM (AAA) WITHOUT RUPTURE: ICD-10-CM

## 2019-09-03 DIAGNOSIS — I71.40 ABDOMINAL AORTIC ANEURYSM (AAA) WITHOUT RUPTURE: Primary | Chronic | ICD-10-CM

## 2019-09-03 DIAGNOSIS — I63.9 CEREBROVASCULAR ACCIDENT (CVA), UNSPECIFIED MECHANISM: ICD-10-CM

## 2019-09-03 DIAGNOSIS — I48.0 PAROXYSMAL ATRIAL FIBRILLATION: ICD-10-CM

## 2019-09-03 DIAGNOSIS — I15.2 HYPERTENSION ASSOCIATED WITH DIABETES: ICD-10-CM

## 2019-09-03 DIAGNOSIS — J44.9 CHRONIC OBSTRUCTIVE PULMONARY DISEASE, UNSPECIFIED COPD TYPE: ICD-10-CM

## 2019-09-03 PROBLEM — I50.31 ACUTE DIASTOLIC CONGESTIVE HEART FAILURE: Status: RESOLVED | Noted: 2019-02-26 | Resolved: 2019-09-03

## 2019-09-03 PROBLEM — E87.6 HYPOKALEMIA: Status: RESOLVED | Noted: 2019-02-28 | Resolved: 2019-09-03

## 2019-09-03 PROCEDURE — 71046 X-RAY EXAM CHEST 2 VIEWS: CPT | Mod: TC,PO

## 2019-09-03 PROCEDURE — 71046 X-RAY EXAM CHEST 2 VIEWS: CPT | Mod: 26,,, | Performed by: RADIOLOGY

## 2019-09-03 PROCEDURE — 99999 PR PBB SHADOW E&M-EST. PATIENT-LVL III: ICD-10-PCS | Mod: PBBFAC,,, | Performed by: INTERNAL MEDICINE

## 2019-09-03 PROCEDURE — 99999 PR PBB SHADOW E&M-EST. PATIENT-LVL III: CPT | Mod: PBBFAC,,, | Performed by: INTERNAL MEDICINE

## 2019-09-03 PROCEDURE — 1101F PT FALLS ASSESS-DOCD LE1/YR: CPT | Mod: CPTII,S$GLB,, | Performed by: INTERNAL MEDICINE

## 2019-09-03 PROCEDURE — 99214 OFFICE O/P EST MOD 30 MIN: CPT | Mod: S$GLB,,, | Performed by: INTERNAL MEDICINE

## 2019-09-03 PROCEDURE — 71046 XR CHEST PA AND LATERAL: ICD-10-PCS | Mod: 26,,, | Performed by: RADIOLOGY

## 2019-09-03 PROCEDURE — 99214 PR OFFICE/OUTPT VISIT, EST, LEVL IV, 30-39 MIN: ICD-10-PCS | Mod: S$GLB,,, | Performed by: INTERNAL MEDICINE

## 2019-09-03 PROCEDURE — 1101F PR PT FALLS ASSESS DOC 0-1 FALLS W/OUT INJ PAST YR: ICD-10-PCS | Mod: CPTII,S$GLB,, | Performed by: INTERNAL MEDICINE

## 2019-09-03 RX ORDER — AMIODARONE HYDROCHLORIDE 200 MG/1
TABLET ORAL
Qty: 90 TABLET | Refills: 3 | Status: SHIPPED | OUTPATIENT
Start: 2019-09-03 | End: 2020-10-19

## 2019-09-03 RX ORDER — LANOLIN ALCOHOL/MO/W.PET/CERES
100 CREAM (GRAM) TOPICAL DAILY
COMMUNITY

## 2019-09-03 NOTE — PROGRESS NOTES
Subjective:   Patient ID:  Aleksandr Schultz is a 89 y.o. male who presents for follow-up of Hypertension      HPI: Patient has no specific complaints. He has occasional balance issues and uses walker or a cane for ambulation.  He is not adhering to low salt diet. Wear support socks, but has pepripheral swelling.    Lab Results   Component Value Date     08/27/2019    K 4.4 08/27/2019     08/27/2019    CO2 25 08/27/2019    BUN 28 08/27/2019    CREATININE 1.6 (H) 08/27/2019     (H) 08/27/2019    HGBA1C 6.0 (H) 08/27/2019    MG 2.3 05/29/2017    AST 28 08/27/2019    ALT 26 08/27/2019    ALBUMIN 3.7 08/27/2019    PROT 7.2 08/27/2019    BILITOT 0.4 08/27/2019    WBC 8.37 03/02/2019    HGB 10.5 (L) 03/02/2019    HCT 32.4 (L) 03/02/2019    MCV 93 03/02/2019     03/02/2019    INR 1.0 05/02/2017    PSA 0.65 12/04/2012    TSH 3.692 08/27/2019    CHOL 148 08/27/2019    HDL 48 08/27/2019    LDLCALC 85.0 08/27/2019    TRIG 75 08/27/2019       Review of Systems   Constitution: Positive for malaise/fatigue.   HENT: Positive for hearing loss.    Eyes: Negative.    Cardiovascular: Positive for dyspnea on exertion, irregular heartbeat, leg swelling and palpitations. Negative for chest pain, claudication, near-syncope and syncope.   Respiratory: Positive for cough, sputum production and wheezing. Negative for shortness of breath and snoring.    Endocrine: Negative.  Negative for cold intolerance, heat intolerance, polydipsia, polyphagia and polyuria.   Skin: Negative.    Musculoskeletal: Positive for arthritis, back pain, falls, joint pain, muscle cramps, muscle weakness and neck pain.   Gastrointestinal: Negative.    Genitourinary: Negative.    Neurological: Positive for loss of balance and weakness.   Psychiatric/Behavioral: Negative.        Objective:   Physical Exam   Constitutional: He is oriented to person, place, and time. He appears well-developed and well-nourished.   BP (!) 160/72   Pulse (!) 51    "Ht 5' 9" (1.753 m)   Wt 77.2 kg (170 lb 1.4 oz)   BMI 25.12 kg/m²      HENT:   Head: Normocephalic.   Eyes: Pupils are equal, round, and reactive to light.   Neck: Normal range of motion. Neck supple. Carotid bruit is present. No thyromegaly present.   Cardiovascular: Normal rate, regular rhythm and intact distal pulses. Exam reveals no gallop and no friction rub.   Murmur heard.   Harsh early systolic murmur is present with a grade of 2/6 at the upper right sternal border radiating to the neck.  Pulses:       Carotid pulses are 2+ on the right side with bruit, and 2+ on the left side with bruit.       Radial pulses are 2+ on the right side, and 2+ on the left side.        Femoral pulses are 2+ on the right side, and 2+ on the left side.       Popliteal pulses are 2+ on the right side, and 2+ on the left side.        Dorsalis pedis pulses are 2+ on the right side, and 2+ on the left side.        Posterior tibial pulses are 2+ on the right side, and 2+ on the left side.   Pulmonary/Chest: Effort normal. No respiratory distress. He has no wheezes. He has rales in the right middle field, the right lower field, the left middle field and the left lower field. He exhibits no tenderness.   "Cellophane" crackles   Abdominal: Soft. Bowel sounds are normal.   Musculoskeletal: Normal range of motion. He exhibits edema.   2 + bilateral   Neurological: He is alert and oriented to person, place, and time.   Skin: Skin is warm and dry.   Psychiatric: He has a normal mood and affect.   Nursing note and vitals reviewed.        Assessment and Plan:     Problem List Items Addressed This Visit        Cardiology Problems    AAA (abdominal aortic aneurysm) - Primary (Chronic)    Relevant Orders    X-Ray Chest PA And Lateral (Completed)    Bilateral carotid artery disease (Chronic)    Relevant Orders    X-Ray Chest PA And Lateral (Completed)    PAD (peripheral artery disease)    Relevant Orders    X-Ray Chest PA And Lateral (Completed) "    Hyperlipidemia    Relevant Medications    amiodarone (PACERONE) 200 MG Tab    Other Relevant Orders    X-Ray Chest PA And Lateral (Completed)    Paroxysmal atrial fibrillation    Relevant Orders    X-Ray Chest PA And Lateral (Completed)    Sinus noemy-tachy syndrome    Relevant Orders    X-Ray Chest PA And Lateral (Completed)    Nonrheumatic aortic valve insufficiency, mild to moderate    Relevant Medications    amiodarone (PACERONE) 200 MG Tab    Other Relevant Orders    X-Ray Chest PA And Lateral (Completed)    Cerebrovascular accident (CVA)    Relevant Orders    X-Ray Chest PA And Lateral (Completed)    Hypertension associated with diabetes    Relevant Medications    amiodarone (PACERONE) 200 MG Tab    Other Relevant Orders    X-Ray Chest PA And Lateral (Completed)    Chronic diastolic (congestive) heart failure       Other    COPD (chronic obstructive pulmonary disease) (Chronic)    Relevant Orders    X-Ray Chest PA And Lateral (Completed)    Type 2 diabetes, controlled, with neuropathy    Relevant Orders    X-Ray Chest PA And Lateral (Completed)    MICHELLE on CPAP    Relevant Orders    X-Ray Chest PA And Lateral (Completed)    CKD (chronic kidney disease) stage 3, GFR 30-59 ml/min    Relevant Orders    X-Ray Chest PA And Lateral (Completed)    Acquired hypothyroidism    Relevant Orders    X-Ray Chest PA And Lateral (Completed)          Patient's Medications   New Prescriptions    No medications on file   Previous Medications    ACETAMINOPHEN (TYLENOL) 325 MG TABLET    Take 2 tablets (650 mg total) by mouth every 4 (four) hours as needed.    ALBUTEROL (PROVENTIL) 2.5 MG /3 ML (0.083 %) NEBULIZER SOLUTION    Take 2.5 mg by nebulization 4 (four) times daily as needed.     AMLODIPINE (NORVASC) 10 MG TABLET    TAKE 1 TABLET EVERY DAY    ASPIRIN (ECOTRIN) 81 MG EC TABLET    Take 81 mg by mouth once daily.      ATORVASTATIN (LIPITOR) 40 MG TABLET    Take 40 mg by mouth once daily.    CYANOCOBALAMIN (VITAMIN B-12) 1000  MCG TABLET    Take 100 mcg by mouth once daily.    CYCLOBENZAPRINE (FLEXERIL) 10 MG TABLET    Take 10 mg by mouth 3 (three) times daily as needed. Pt taking one pill at night.    ELIQUIS 2.5 MG TAB    TAKE 1 TABLET TWICE DAILY    FUROSEMIDE (LASIX) 40 MG TABLET    Take 1 tablet (40 mg total) by mouth 2 (two) times daily.    GLIMEPIRIDE (AMARYL) 4 MG TABLET    Take 4 mg by mouth 2 (two) times daily.     HYDRALAZINE (APRESOLINE) 25 MG TABLET    TAKE 1 TABLET (25 MG TOTAL) BY MOUTH EVERY 8 (EIGHT) HOURS.    IPRATROPIUM (ATROVENT) 0.02 % NEBULIZER SOLUTION    Take 500 mcg by nebulization 4 (four) times daily. PRN    LORATADINE 10 MG CAP    Take 1 tablet by mouth once daily.     MAGNESIUM HYDROXIDE 400 MG/5 ML (MILK OF MAGNESIA) 400 MG/5 ML SUSP    Take 30 mLs (2,400 mg total) by mouth daily as needed.    METOPROLOL TARTRATE (LOPRESSOR) 50 MG TABLET    Take 50 mg by mouth 2 (two) times daily.    MOMETASONE (NASONEX) 50 MCG/ACTUATION NASAL SPRAY    2 sprays by Nasal route daily as needed.     NITROGLYCERIN (NITROSTAT) 0.4 MG SL TABLET    Place 0.4 mg under the tongue every 5 (five) minutes as needed (PRN).     PIOGLITAZONE (ACTOS) 15 MG TABLET    Take 15 mg by mouth once daily.    POTASSIUM CHLORIDE (MICRO-K) 10 MEQ CPSR    Take 10 mEq by mouth once daily. Pt to take one pill every other day per Dr. Lipscomb on 6/6/19.    RANITIDINE (ZANTAC) 150 MG TABLET    Take 150 mg by mouth 2 (two) times daily. Pt taking prn.    TAMSULOSIN (FLOMAX) 0.4 MG CP24    Take 0.8 mg by mouth every evening.     UNABLE TO FIND    Take by mouth once daily. [facility]    VITAMIN D 1000 UNITS TAB    Take 1,000 Units by mouth once daily.   Modified Medications    Modified Medication Previous Medication    AMIODARONE (PACERONE) 200 MG TAB amiodarone (PACERONE) 200 MG Tab       TAKE 1 TABLET ONE TIME DAILY. TAKE AT NIGHT PER DR. LIPSCOMB ON 6/23/17.    TAKE 1 TABLET ONE TIME DAILY. TAKE AT NIGHT PER DR. LIPSCOMB ON 6/23/17.  "  Discontinued Medications    AZITHROMYCIN (Z-DASHA) 250 MG TABLET        ERYTHROMYCIN (ROMYCIN) OPHTHALMIC OINTMENT    Place into both eyes every 6 (six) hours.    LEVOTHYROXINE (SYNTHROID) 50 MCG TABLET    Take 1 tablet (50 mcg total) by mouth before breakfast.     Known severe right ICA stenosis. Dr. Ace advised about high surgical risk and recommended medical follow up.  Moderate aortic valve stenosis. Continue medical follow up.  Advised about salt restrictions and increase Lasix to 80/40 on alternate days.  Keep BP log and call if out of range. May need to increase Hydralazine next time.  CXR to address "cellophane" type crackles.  Patient has known COPD and chronic interstitial lung disease.  Follow up in about 6 months (around 3/3/2020).          "

## 2019-09-03 NOTE — TELEPHONE ENCOUNTER
----- Message from Lynne Buckner MD sent at 9/3/2019 11:48 AM CDT -----  Please inform the patient that his CXR was fine. No changes in the medical therapy.

## 2020-01-21 ENCOUNTER — TELEPHONE (OUTPATIENT)
Dept: CARDIOLOGY | Facility: CLINIC | Age: 85
End: 2020-01-21

## 2020-01-21 DIAGNOSIS — I15.2 HYPERTENSION ASSOCIATED WITH DIABETES: ICD-10-CM

## 2020-01-21 DIAGNOSIS — E11.59 HYPERTENSION ASSOCIATED WITH DIABETES: ICD-10-CM

## 2020-01-21 DIAGNOSIS — E03.9 ACQUIRED HYPOTHYROIDISM: ICD-10-CM

## 2020-01-21 DIAGNOSIS — E11.40 TYPE 2 DIABETES, CONTROLLED, WITH NEUROPATHY: ICD-10-CM

## 2020-01-21 DIAGNOSIS — E78.2 MIXED HYPERLIPIDEMIA: Primary | ICD-10-CM

## 2020-01-21 DIAGNOSIS — N18.30 CKD (CHRONIC KIDNEY DISEASE) STAGE 3, GFR 30-59 ML/MIN: ICD-10-CM

## 2020-01-26 DIAGNOSIS — E11.59 HYPERTENSION ASSOCIATED WITH DIABETES: ICD-10-CM

## 2020-01-26 DIAGNOSIS — I35.1 NONRHEUMATIC AORTIC VALVE INSUFFICIENCY: ICD-10-CM

## 2020-01-26 DIAGNOSIS — I15.2 HYPERTENSION ASSOCIATED WITH DIABETES: ICD-10-CM

## 2020-01-26 DIAGNOSIS — E78.2 MIXED HYPERLIPIDEMIA: ICD-10-CM

## 2020-01-27 RX ORDER — AMLODIPINE BESYLATE 10 MG/1
TABLET ORAL
Qty: 90 TABLET | Refills: 3 | Status: SHIPPED | OUTPATIENT
Start: 2020-01-27 | End: 2020-12-31

## 2020-01-27 RX ORDER — APIXABAN 2.5 MG/1
TABLET, FILM COATED ORAL
Qty: 180 TABLET | Refills: 3 | Status: SHIPPED | OUTPATIENT
Start: 2020-01-27 | End: 2020-12-31

## 2020-01-29 DIAGNOSIS — E11.59 HYPERTENSION ASSOCIATED WITH DIABETES: ICD-10-CM

## 2020-01-29 DIAGNOSIS — I15.2 HYPERTENSION ASSOCIATED WITH DIABETES: ICD-10-CM

## 2020-01-29 RX ORDER — HYDRALAZINE HYDROCHLORIDE 25 MG/1
TABLET, FILM COATED ORAL
Qty: 270 TABLET | Refills: 2 | Status: SHIPPED | OUTPATIENT
Start: 2020-01-29 | End: 2020-12-31

## 2020-03-16 ENCOUNTER — PATIENT MESSAGE (OUTPATIENT)
Dept: CARDIOLOGY | Facility: CLINIC | Age: 85
End: 2020-03-16

## 2020-04-27 ENCOUNTER — PATIENT MESSAGE (OUTPATIENT)
Dept: CARDIOLOGY | Facility: CLINIC | Age: 85
End: 2020-04-27

## 2020-05-11 ENCOUNTER — TELEPHONE (OUTPATIENT)
Dept: CARDIOLOGY | Facility: CLINIC | Age: 85
End: 2020-05-11

## 2020-05-12 NOTE — TELEPHONE ENCOUNTER
Reviewed records from 4/23/20 ER visit for chest pain. TP and BNP normal. ECG, no acute changes. 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

## 2020-05-28 ENCOUNTER — TELEPHONE (OUTPATIENT)
Dept: CARDIOLOGY | Facility: CLINIC | Age: 85
End: 2020-05-28

## 2020-05-28 ENCOUNTER — LAB VISIT (OUTPATIENT)
Dept: LAB | Facility: HOSPITAL | Age: 85
End: 2020-05-28
Attending: INTERNAL MEDICINE
Payer: MEDICARE

## 2020-05-28 DIAGNOSIS — E03.9 ACQUIRED HYPOTHYROIDISM: ICD-10-CM

## 2020-05-28 DIAGNOSIS — E11.59 HYPERTENSION ASSOCIATED WITH DIABETES: ICD-10-CM

## 2020-05-28 DIAGNOSIS — E78.2 MIXED HYPERLIPIDEMIA: ICD-10-CM

## 2020-05-28 DIAGNOSIS — I15.2 HYPERTENSION ASSOCIATED WITH DIABETES: ICD-10-CM

## 2020-05-28 DIAGNOSIS — N18.30 CKD (CHRONIC KIDNEY DISEASE) STAGE 3, GFR 30-59 ML/MIN: ICD-10-CM

## 2020-05-28 DIAGNOSIS — E11.40 TYPE 2 DIABETES, CONTROLLED, WITH NEUROPATHY: ICD-10-CM

## 2020-05-28 LAB
ALBUMIN SERPL BCP-MCNC: 3.5 G/DL (ref 3.5–5.2)
ALP SERPL-CCNC: 96 U/L (ref 55–135)
ALT SERPL W/O P-5'-P-CCNC: 22 U/L (ref 10–44)
ANION GAP SERPL CALC-SCNC: 10 MMOL/L (ref 8–16)
AST SERPL-CCNC: 22 U/L (ref 10–40)
BILIRUB SERPL-MCNC: 0.5 MG/DL (ref 0.1–1)
BUN SERPL-MCNC: 21 MG/DL (ref 10–30)
CALCIUM SERPL-MCNC: 9.2 MG/DL (ref 8.7–10.5)
CHLORIDE SERPL-SCNC: 102 MMOL/L (ref 95–110)
CHOLEST SERPL-MCNC: 156 MG/DL (ref 120–199)
CHOLEST/HDLC SERPL: 3.9 {RATIO} (ref 2–5)
CO2 SERPL-SCNC: 29 MMOL/L (ref 23–29)
CREAT SERPL-MCNC: 1.4 MG/DL (ref 0.5–1.4)
EST. GFR  (AFRICAN AMERICAN): 50 ML/MIN/1.73 M^2
EST. GFR  (NON AFRICAN AMERICAN): 43 ML/MIN/1.73 M^2
ESTIMATED AVG GLUCOSE: 140 MG/DL (ref 68–131)
GLUCOSE SERPL-MCNC: 125 MG/DL (ref 70–110)
HBA1C MFR BLD HPLC: 6.5 % (ref 4–5.6)
HDLC SERPL-MCNC: 40 MG/DL (ref 40–75)
HDLC SERPL: 25.6 % (ref 20–50)
LDLC SERPL CALC-MCNC: 93.8 MG/DL (ref 63–159)
NONHDLC SERPL-MCNC: 116 MG/DL
POTASSIUM SERPL-SCNC: 4.2 MMOL/L (ref 3.5–5.1)
PROT SERPL-MCNC: 7.8 G/DL (ref 6–8.4)
SODIUM SERPL-SCNC: 141 MMOL/L (ref 136–145)
T4 FREE SERPL-MCNC: 1.26 NG/DL (ref 0.71–1.51)
TRIGL SERPL-MCNC: 111 MG/DL (ref 30–150)
TSH SERPL DL<=0.005 MIU/L-ACNC: 4.72 UIU/ML (ref 0.4–4)

## 2020-05-28 PROCEDURE — 84439 ASSAY OF FREE THYROXINE: CPT

## 2020-05-28 PROCEDURE — 80061 LIPID PANEL: CPT

## 2020-05-28 PROCEDURE — 84443 ASSAY THYROID STIM HORMONE: CPT

## 2020-05-28 PROCEDURE — 83036 HEMOGLOBIN GLYCOSYLATED A1C: CPT

## 2020-05-28 PROCEDURE — 80053 COMPREHEN METABOLIC PANEL: CPT

## 2020-05-28 PROCEDURE — 36415 COLL VENOUS BLD VENIPUNCTURE: CPT

## 2020-05-28 NOTE — TELEPHONE ENCOUNTER
----- Message from Lynne Buckner MD sent at 5/28/2020  4:23 PM CDT -----  Please review.  We will discuss the results during your upcoming visit with me. It looks good.

## 2020-07-09 ENCOUNTER — TELEPHONE (OUTPATIENT)
Dept: CARDIOLOGY | Facility: CLINIC | Age: 85
End: 2020-07-09

## 2020-07-09 DIAGNOSIS — E78.2 MIXED HYPERLIPIDEMIA: Primary | ICD-10-CM

## 2020-07-09 DIAGNOSIS — E03.9 ACQUIRED HYPOTHYROIDISM: ICD-10-CM

## 2020-07-09 DIAGNOSIS — E11.59 HYPERTENSION ASSOCIATED WITH DIABETES: ICD-10-CM

## 2020-07-09 DIAGNOSIS — I15.2 HYPERTENSION ASSOCIATED WITH DIABETES: ICD-10-CM

## 2020-07-09 DIAGNOSIS — N18.30 CKD (CHRONIC KIDNEY DISEASE) STAGE 3, GFR 30-59 ML/MIN: ICD-10-CM

## 2020-10-05 ENCOUNTER — LAB VISIT (OUTPATIENT)
Dept: LAB | Facility: HOSPITAL | Age: 85
End: 2020-10-05
Attending: INTERNAL MEDICINE
Payer: MEDICARE

## 2020-10-05 DIAGNOSIS — I15.2 HYPERTENSION ASSOCIATED WITH DIABETES: ICD-10-CM

## 2020-10-05 DIAGNOSIS — E78.2 MIXED HYPERLIPIDEMIA: ICD-10-CM

## 2020-10-05 DIAGNOSIS — E11.59 HYPERTENSION ASSOCIATED WITH DIABETES: ICD-10-CM

## 2020-10-05 DIAGNOSIS — E03.9 ACQUIRED HYPOTHYROIDISM: ICD-10-CM

## 2020-10-05 DIAGNOSIS — N18.30 CKD (CHRONIC KIDNEY DISEASE) STAGE 3, GFR 30-59 ML/MIN: ICD-10-CM

## 2020-10-05 LAB
ALBUMIN SERPL BCP-MCNC: 3.6 G/DL (ref 3.5–5.2)
ALP SERPL-CCNC: 97 U/L (ref 55–135)
ALT SERPL W/O P-5'-P-CCNC: 27 U/L (ref 10–44)
ANION GAP SERPL CALC-SCNC: 10 MMOL/L (ref 8–16)
AST SERPL-CCNC: 25 U/L (ref 10–40)
BILIRUB SERPL-MCNC: 0.4 MG/DL (ref 0.1–1)
BUN SERPL-MCNC: 32 MG/DL (ref 10–30)
CALCIUM SERPL-MCNC: 9.1 MG/DL (ref 8.7–10.5)
CHLORIDE SERPL-SCNC: 104 MMOL/L (ref 95–110)
CHOLEST SERPL-MCNC: 143 MG/DL (ref 120–199)
CHOLEST/HDLC SERPL: 3.5 {RATIO} (ref 2–5)
CO2 SERPL-SCNC: 28 MMOL/L (ref 23–29)
CREAT SERPL-MCNC: 1.7 MG/DL (ref 0.5–1.4)
EST. GFR  (AFRICAN AMERICAN): 39 ML/MIN/1.73 M^2
EST. GFR  (NON AFRICAN AMERICAN): 34 ML/MIN/1.73 M^2
ESTIMATED AVG GLUCOSE: 131 MG/DL (ref 68–131)
GLUCOSE SERPL-MCNC: 116 MG/DL (ref 70–110)
HBA1C MFR BLD HPLC: 6.2 % (ref 4–5.6)
HDLC SERPL-MCNC: 41 MG/DL (ref 40–75)
HDLC SERPL: 28.7 % (ref 20–50)
LDLC SERPL CALC-MCNC: 86.6 MG/DL (ref 63–159)
NONHDLC SERPL-MCNC: 102 MG/DL
POTASSIUM SERPL-SCNC: 4.6 MMOL/L (ref 3.5–5.1)
PROT SERPL-MCNC: 7.4 G/DL (ref 6–8.4)
SODIUM SERPL-SCNC: 142 MMOL/L (ref 136–145)
T4 FREE SERPL-MCNC: 1.3 NG/DL (ref 0.71–1.51)
TRIGL SERPL-MCNC: 77 MG/DL (ref 30–150)
TSH SERPL DL<=0.005 MIU/L-ACNC: 4.02 UIU/ML (ref 0.4–4)

## 2020-10-05 PROCEDURE — 80053 COMPREHEN METABOLIC PANEL: CPT

## 2020-10-05 PROCEDURE — 83036 HEMOGLOBIN GLYCOSYLATED A1C: CPT

## 2020-10-05 PROCEDURE — 80061 LIPID PANEL: CPT

## 2020-10-05 PROCEDURE — 84439 ASSAY OF FREE THYROXINE: CPT

## 2020-10-05 PROCEDURE — 36415 COLL VENOUS BLD VENIPUNCTURE: CPT

## 2020-10-05 PROCEDURE — 84443 ASSAY THYROID STIM HORMONE: CPT

## 2020-10-06 ENCOUNTER — TELEPHONE (OUTPATIENT)
Dept: CARDIOLOGY | Facility: CLINIC | Age: 85
End: 2020-10-06

## 2020-10-06 NOTE — TELEPHONE ENCOUNTER
----- Message from Lynne Buckner MD sent at 10/6/2020  9:11 AM CDT -----  Please review.  We will discuss the results during your upcoming visit with me. No significant change from the last one.

## 2020-10-12 ENCOUNTER — OFFICE VISIT (OUTPATIENT)
Dept: CARDIOLOGY | Facility: CLINIC | Age: 85
End: 2020-10-12
Payer: MEDICARE

## 2020-10-12 VITALS
WEIGHT: 157.94 LBS | BODY MASS INDEX: 24.79 KG/M2 | HEART RATE: 56 BPM | SYSTOLIC BLOOD PRESSURE: 160 MMHG | DIASTOLIC BLOOD PRESSURE: 68 MMHG | HEIGHT: 67 IN

## 2020-10-12 DIAGNOSIS — I65.23 BILATERAL CAROTID ARTERY STENOSIS: Chronic | ICD-10-CM

## 2020-10-12 DIAGNOSIS — G47.33 OSA ON CPAP: ICD-10-CM

## 2020-10-12 DIAGNOSIS — E11.40 TYPE 2 DIABETES, CONTROLLED, WITH NEUROPATHY: ICD-10-CM

## 2020-10-12 DIAGNOSIS — I15.2 HYPERTENSION ASSOCIATED WITH DIABETES: Primary | ICD-10-CM

## 2020-10-12 DIAGNOSIS — E11.59 HYPERTENSION ASSOCIATED WITH DIABETES: Primary | ICD-10-CM

## 2020-10-12 DIAGNOSIS — I71.40 ABDOMINAL AORTIC ANEURYSM (AAA) WITHOUT RUPTURE: Chronic | ICD-10-CM

## 2020-10-12 DIAGNOSIS — N18.31 STAGE 3A CHRONIC KIDNEY DISEASE: ICD-10-CM

## 2020-10-12 DIAGNOSIS — I35.1 NONRHEUMATIC AORTIC VALVE INSUFFICIENCY: ICD-10-CM

## 2020-10-12 DIAGNOSIS — I49.5 SINUS BRADY-TACHY SYNDROME: ICD-10-CM

## 2020-10-12 DIAGNOSIS — I50.32 CHRONIC DIASTOLIC (CONGESTIVE) HEART FAILURE: ICD-10-CM

## 2020-10-12 DIAGNOSIS — I73.9 PAD (PERIPHERAL ARTERY DISEASE): ICD-10-CM

## 2020-10-12 DIAGNOSIS — I48.0 PAROXYSMAL ATRIAL FIBRILLATION: ICD-10-CM

## 2020-10-12 DIAGNOSIS — E55.9 VITAMIN D DEFICIENCY DISEASE: ICD-10-CM

## 2020-10-12 DIAGNOSIS — N25.81 SECONDARY HYPERPARATHYROIDISM: ICD-10-CM

## 2020-10-12 DIAGNOSIS — Z85.46 HISTORY OF PROSTATE CANCER: ICD-10-CM

## 2020-10-12 DIAGNOSIS — E78.2 MIXED HYPERLIPIDEMIA: ICD-10-CM

## 2020-10-12 DIAGNOSIS — J44.9 CHRONIC OBSTRUCTIVE PULMONARY DISEASE, UNSPECIFIED COPD TYPE: Chronic | ICD-10-CM

## 2020-10-12 DIAGNOSIS — E03.9 ACQUIRED HYPOTHYROIDISM: ICD-10-CM

## 2020-10-12 PROCEDURE — 99999 PR PBB SHADOW E&M-EST. PATIENT-LVL V: ICD-10-PCS | Mod: PBBFAC,,, | Performed by: INTERNAL MEDICINE

## 2020-10-12 PROCEDURE — 1101F PT FALLS ASSESS-DOCD LE1/YR: CPT | Mod: CPTII,S$GLB,, | Performed by: INTERNAL MEDICINE

## 2020-10-12 PROCEDURE — 1159F MED LIST DOCD IN RCRD: CPT | Mod: S$GLB,,, | Performed by: INTERNAL MEDICINE

## 2020-10-12 PROCEDURE — 1125F AMNT PAIN NOTED PAIN PRSNT: CPT | Mod: S$GLB,,, | Performed by: INTERNAL MEDICINE

## 2020-10-12 PROCEDURE — 1159F PR MEDICATION LIST DOCUMENTED IN MEDICAL RECORD: ICD-10-PCS | Mod: S$GLB,,, | Performed by: INTERNAL MEDICINE

## 2020-10-12 PROCEDURE — 1101F PR PT FALLS ASSESS DOC 0-1 FALLS W/OUT INJ PAST YR: ICD-10-PCS | Mod: CPTII,S$GLB,, | Performed by: INTERNAL MEDICINE

## 2020-10-12 PROCEDURE — 99999 PR PBB SHADOW E&M-EST. PATIENT-LVL V: CPT | Mod: PBBFAC,,, | Performed by: INTERNAL MEDICINE

## 2020-10-12 PROCEDURE — 1125F PR PAIN SEVERITY QUANTIFIED, PAIN PRESENT: ICD-10-PCS | Mod: S$GLB,,, | Performed by: INTERNAL MEDICINE

## 2020-10-12 PROCEDURE — 99214 OFFICE O/P EST MOD 30 MIN: CPT | Mod: S$GLB,,, | Performed by: INTERNAL MEDICINE

## 2020-10-12 PROCEDURE — 99214 PR OFFICE/OUTPT VISIT, EST, LEVL IV, 30-39 MIN: ICD-10-PCS | Mod: S$GLB,,, | Performed by: INTERNAL MEDICINE

## 2020-10-12 RX ORDER — INFLUENZA A VIRUS A/MICHIGAN/45/2015 X-275 (H1N1) ANTIGEN (FORMALDEHYDE INACTIVATED), INFLUENZA A VIRUS A/SINGAPORE/INFIMH-16-0019/2016 IVR-186 (H3N2) ANTIGEN (FORMALDEHYDE INACTIVATED), INFLUENZA B VIRUS B/PHUKET/3073/2013 ANTIGEN (FORMALDEHYDE INACTIVATED), AND INFLUENZA B VIRUS B/MARYLAND/15/2016 BX-69A ANTIGEN (FORMALDEHYDE INACTIVATED) 60; 60; 60; 60 UG/.7ML; UG/.7ML; UG/.7ML; UG/.7ML
INJECTION, SUSPENSION INTRAMUSCULAR
COMMUNITY
Start: 2020-10-05 | End: 2022-04-13

## 2020-10-12 NOTE — PROGRESS NOTES
Subjective:   Patient ID:  Aleksandr Schultz is a 89 y.o. male who presents for follow-up of Hypertension      HPI: No specific complaints. Stays sedentary due to extensive DJD symptoms. Does not adhere to law salt diet. BP is elevated today.    Lab Results   Component Value Date     10/05/2020    K 4.6 10/05/2020     10/05/2020    CO2 28 10/05/2020    BUN 32 (H) 10/05/2020    CREATININE 1.7 (H) 10/05/2020     (H) 10/05/2020    HGBA1C 6.2 (H) 10/05/2020    MG 2.3 05/29/2017    AST 25 10/05/2020    ALT 27 10/05/2020    ALBUMIN 3.6 10/05/2020    PROT 7.4 10/05/2020    BILITOT 0.4 10/05/2020    WBC 8.37 03/02/2019    HGB 10.5 (L) 03/02/2019    HCT 32.4 (L) 03/02/2019    MCV 93 03/02/2019     03/02/2019    INR 1.0 05/02/2017    PSA 0.65 12/04/2012    TSH 4.019 (H) 10/05/2020    CHOL 143 10/05/2020    HDL 41 10/05/2020    LDLCALC 86.6 10/05/2020    TRIG 77 10/05/2020       Review of Systems   Constitution: Negative.   HENT: Negative.    Eyes: Negative.    Cardiovascular: Positive for dyspnea on exertion and irregular heartbeat. Negative for chest pain, claudication, leg swelling, near-syncope, palpitations and syncope.   Respiratory: Positive for cough, sputum production and wheezing. Negative for shortness of breath and snoring.    Endocrine: Negative.  Negative for cold intolerance, heat intolerance, polydipsia, polyphagia and polyuria.   Hematologic/Lymphatic: Bruises/bleeds easily.   Skin: Negative.    Musculoskeletal: Positive for arthritis, back pain, falls, joint pain, muscle cramps, muscle weakness and neck pain.   Gastrointestinal: Positive for constipation and dysphagia.   Genitourinary: Negative.    Neurological: Positive for excessive daytime sleepiness and loss of balance.   Psychiatric/Behavioral: Negative.        Objective:   Physical Exam   Constitutional: He is oriented to person, place, and time. He appears well-developed and well-nourished.   BP (!) 160/68   Pulse (!) 56   Ht  "5' 7" (1.702 m)   Wt 71.6 kg (157 lb 15.4 oz)   BMI 24.74 kg/m²      HENT:   Head: Normocephalic.   Eyes: Pupils are equal, round, and reactive to light.   Neck: Normal range of motion. Neck supple. Carotid bruit is not present. No thyromegaly present.   Cardiovascular: Normal rate, regular rhythm, normal heart sounds and intact distal pulses. Exam reveals no gallop and no friction rub.   No murmur heard.  Pulses:       Carotid pulses are 2+ on the right side and 2+ on the left side.       Radial pulses are 2+ on the right side and 2+ on the left side.        Femoral pulses are 2+ on the right side and 2+ on the left side.       Popliteal pulses are 2+ on the right side and 2+ on the left side.        Dorsalis pedis pulses are 2+ on the right side and 2+ on the left side.        Posterior tibial pulses are 2+ on the right side and 2+ on the left side.   Pulmonary/Chest: Effort normal and breath sounds normal. No respiratory distress. He has no wheezes. He has no rales. He exhibits no tenderness.   Abdominal: Soft. Bowel sounds are normal.   Musculoskeletal: Normal range of motion.         General: No edema.   Neurological: He is alert and oriented to person, place, and time.   Skin: Skin is warm and dry.   Psychiatric: He has a normal mood and affect.   Nursing note and vitals reviewed.        Assessment and Plan:     Problem List Items Addressed This Visit        Cardiology Problems    AAA (abdominal aortic aneurysm) (Chronic)    Bilateral carotid artery disease (Chronic)    PAD (peripheral artery disease)    Hyperlipidemia    Paroxysmal atrial fibrillation    Sinus noemy-tachy syndrome    Nonrheumatic aortic valve insufficiency, mild to moderate    Hypertension associated with diabetes - Primary    Chronic diastolic (congestive) heart failure       Other    COPD (chronic obstructive pulmonary disease) (Chronic)    Secondary hyperparathyroidism    History of prostate cancer    Vitamin D deficiency disease    Type " 2 diabetes, controlled, with neuropathy    MICHELLE on CPAP    CKD (chronic kidney disease) stage 3, GFR 30-59 ml/min    Acquired hypothyroidism          Patient's Medications   New Prescriptions    No medications on file   Previous Medications    ACETAMINOPHEN (TYLENOL) 325 MG TABLET    Take 2 tablets (650 mg total) by mouth every 4 (four) hours as needed.    ALBUTEROL (PROVENTIL) 2.5 MG /3 ML (0.083 %) NEBULIZER SOLUTION    Take 2.5 mg by nebulization 4 (four) times daily as needed.     AMIODARONE (PACERONE) 200 MG TAB    TAKE 1 TABLET ONE TIME DAILY. TAKE AT NIGHT PER DR. BUCKNER ON 6/23/17.    AMLODIPINE (NORVASC) 10 MG TABLET    TAKE 1 TABLET EVERY DAY    ASPIRIN (ECOTRIN) 81 MG EC TABLET    Take 81 mg by mouth once daily.      ATORVASTATIN (LIPITOR) 40 MG TABLET    Take 40 mg by mouth once daily. Pt taking 1/2 pill daily.    CYANOCOBALAMIN (VITAMIN B-12) 1000 MCG TABLET    Take 100 mcg by mouth once daily.    CYCLOBENZAPRINE (FLEXERIL) 10 MG TABLET    Take 10 mg by mouth 3 (three) times daily as needed. Pt taking one pill at night.    ELIQUIS 2.5 MG TAB    TAKE 1 TABLET TWICE DAILY    FLUZONE HIGHDOSE QUAD 20-21  MCG/0.7 ML SYRG        FUROSEMIDE (LASIX) 40 MG TABLET    Take 1 tablet (40 mg total) by mouth 2 (two) times daily. Pt to take one pill daily per Dr. Buckner on 6/6/19.    GLIMEPIRIDE (AMARYL) 4 MG TABLET    Take 4 mg by mouth 2 (two) times daily.     HYDRALAZINE (APRESOLINE) 25 MG TABLET    TAKE 1 TABLET EVERY 8 HOURS    IPRATROPIUM (ATROVENT) 0.02 % NEBULIZER SOLUTION    Take 500 mcg by nebulization 4 (four) times daily. PRN    LORATADINE 10 MG CAP    Take 1 tablet by mouth once daily.     METOPROLOL TARTRATE (LOPRESSOR) 50 MG TABLET    Take 50 mg by mouth 2 (two) times daily.    MOMETASONE (NASONEX) 50 MCG/ACTUATION NASAL SPRAY    2 sprays by Nasal route daily as needed.     NITROGLYCERIN (NITROSTAT) 0.4 MG SL TABLET    Place 0.4 mg under the tongue every 5 (five) minutes as needed (PRN).      PIOGLITAZONE (ACTOS) 15 MG TABLET    Take 15 mg by mouth once daily.    POTASSIUM CHLORIDE (MICRO-K) 10 MEQ CPSR    Take 10 mEq by mouth once daily. Pt to take one pill every other day per Dr. Buckner on 6/6/19.    TAMSULOSIN (FLOMAX) 0.4 MG CP24    Take 0.8 mg by mouth every evening.     UNABLE TO FIND    Take by mouth once daily. LBS veggie fiber tablet    VITAMIN D 1000 UNITS TAB    Take 1,000 Units by mouth once daily.   Modified Medications    No medications on file   Discontinued Medications    MAGNESIUM HYDROXIDE 400 MG/5 ML (MILK OF MAGNESIA) 400 MG/5 ML SUSP    Take 30 mLs (2,400 mg total) by mouth daily as needed.    RANITIDINE (ZANTAC) 150 MG TABLET    Take 150 mg by mouth 2 (two) times daily. Pt taking prn.    UNABLE TO FIND    Take by mouth once daily. [facility]     Low salt diet recommended.  May increase Hydralazine to %0 mg in am and PM and continue on 25 mg at lunch.  Monitor BP and call if out of range.  Follow up in about 6 months (around 4/12/2021).

## 2020-11-14 NOTE — TELEPHONE ENCOUNTER
----- Message from Lynne Bucknre MD sent at 7/8/2017 10:32 AM CDT -----   Marion,  Please review results of your blood work. It is essentially unchanged from the last one. Chronic kidney disease is stable.   Opt out

## 2021-01-11 DIAGNOSIS — I50.32 CHRONIC DIASTOLIC (CONGESTIVE) HEART FAILURE: Primary | ICD-10-CM

## 2021-01-11 RX ORDER — FUROSEMIDE 40 MG/1
40 TABLET ORAL 2 TIMES DAILY
Qty: 180 TABLET | Refills: 3 | Status: SHIPPED | OUTPATIENT
Start: 2021-01-11 | End: 2022-04-13

## 2021-01-11 RX ORDER — FUROSEMIDE 40 MG/1
40 TABLET ORAL 2 TIMES DAILY
COMMUNITY
End: 2021-01-11 | Stop reason: SDUPTHER

## 2021-02-17 ENCOUNTER — TELEPHONE (OUTPATIENT)
Dept: CARDIOLOGY | Facility: CLINIC | Age: 86
End: 2021-02-17

## 2021-02-25 NOTE — PROGRESS NOTES
[facility]  Cardiology  Progress Note    Patient Name: Aleksandr Schultz  MRN: [id number]  Admission Date: 2/26/2019  Hospital Length of Stay: 2 days  Code Status: Full Code   Attending Physician: Robyn Pacheco*   Primary Care Physician: Ascencion Cedeño MD  Expected Discharge Date:   Principal Problem:<principal problem not specified>    Subjective:     Hospital Course:   2/26/2019 Presented to the ER with complaints of BLE swelling and SOB.  baseline 113. HR and BP stable. Troponin <.006. Admitted to [facility] with ADHF. Started on IV Lasix BID for volume removal   2/27/2019 Creatinine 1.4 K+ 3.1. Remains on IV Lasix BID with 2.0 liters out so far and negative 1.9 liters since admission. SOB at rest resolves but remains with minimal exertion ie moving in bed. HR and BP stable.   2/28/2019 Creatinine 1.4 K+ 3.0. On IV Lasix BID with 3.8 liters out overnight. Negative 3.7 liters since admission. LE edema improving. Continues to complain of SOB with exertion and JVD remains. Temp this AM of 101.3 and will continue with IV Lasix for continued volume removal         Review of Systems   Constitution: Negative for chills, decreased appetite, diaphoresis, fever and weakness.   Cardiovascular: Positive for dyspnea on exertion. Negative for chest pain, claudication, cyanosis, irregular heartbeat, leg swelling, near-syncope, orthopnea, palpitations, paroxysmal nocturnal dyspnea and syncope.   Respiratory: Negative for cough, hemoptysis, shortness of breath and wheezing.    Gastrointestinal: Negative for bloating, abdominal pain, constipation, diarrhea, melena, nausea and vomiting.   Neurological: Negative for dizziness.     Objective:     Vital Signs (Most Recent):  Temp: 97.1 °F (36.2 °C) (02/28/19 1224)  Pulse: 60 (02/28/19 1224)  Resp: 18 (02/28/19 1224)  BP: (!) 146/67 (02/28/19 1224)  SpO2: 95 % (02/28/19 1224) Vital Signs (24h Range):  Temp:  [97.1 °F (36.2 °C)-103.3 °F (39.6  °C)] 97.1 °F (36.2 °C)  Pulse:  [60-71] 60  Resp:  [14-33] 18  SpO2:  [93 %-96 %] 95 %  BP: (130-164)/(61-73) 146/67     Weight: 83.8 kg (184 lb 11.9 oz)  Body mass index is 28.09 kg/m².     SpO2: 95 %  O2 Device (Oxygen Therapy): room air      Intake/Output Summary (Last 24 hours) at 2/28/2019 1407  Last data filed at 2/28/2019 1000  Gross per 24 hour   Intake 580 ml   Output 2000 ml   Net -1420 ml       Lines/Drains/Airways     Drain            Male External Urinary Catheter 02/27/19 1500 less than 1 day          Peripheral Intravenous Line                 Peripheral IV - Single Lumen 02/26/19 1812 Left Forearm 1 day                Physical Exam   Constitutional: He is oriented to person, place, and time. He appears well-developed and well-nourished. No distress.   Neck: JVD present.   Cardiovascular: Normal rate and regular rhythm. Exam reveals no gallop.   No murmur heard.  Pulmonary/Chest: Effort normal and breath sounds normal. No respiratory distress. He has no wheezes.   Abdominal: Soft. Bowel sounds are normal. He exhibits no distension. There is no tenderness.   Musculoskeletal: He exhibits edema.   Neurological: He is alert and oriented to person, place, and time.   Skin: Skin is warm and dry.       Significant Labs:     Recent Labs   Lab 02/28/19  0734   CALCIUM 8.5*   *   K 3.0*   CO2 31*   CL 96   BUN 20   CREATININE 1.4     Recent Labs   Lab 02/28/19  0734   WBC 10.96   RBC 3.49*   HGB 10.9*   HCT 32.6*      MCV 93   MCH 31.2*   MCHC 33.4         Significant Imaging:   TTE 2/27/2019  · Normal left ventricular systolic function. The estimated ejection fraction is 55%  · Grade I (mild) left ventricular diastolic dysfunction consistent with impaired relaxation.  · Normal right ventricular systolic function.  · Normal left atrial pressure.  · Mild aortic regurgitation.  · The estimated PA systolic pressure is 33 mm Hg  · Intermediate central venous pressure (8 mm Hg).    Assessment and Plan:      Brief HPI: Seen on AM NP rounds and again on rounds this afternoon with Dr Morejon with daughter at the bedside. Reports SOB and BLE edema remain but slightly improved. Reviewed cardiac POC with patient and daughter as detailed below-verbalized understanding and agrees with POC     Acute diastolic congestive heart failure    -related to dietary indiscretion of salt intake  -; baseline 113; BLE edema and JVD remain on exam  -on IV Lasix BID with 3.8 liters out overnight; negative 3.7 liters since admission; needs continued diuresis for additional volume removal   -continue Norvasc, Hydralaizne and Metoprolol with decent afterload reduction  -continue with strict I&Os and daily weights; dietian consulted to low salt diet education/reinforcement  l     CKD (chronic kidney disease) stage 3, GFR 30-59 ml/min    -creatinine 1.4 GFR 43  -stable  -baseline creatinine 1.1-1.8 GFR 37 to >60  -continue to monitor closely with aggressive diuresis      Hypertension associated with diabetes    -stable  -continue Norvasc, Hydralazine and Metoprolol       Paroxysmal atrial fibrillation    -currently NSR; admit EKG NSR  -continue Amiodarone, BB and Eliquis          VTE Risk Mitigation (From admission, onward)        Ordered     apixaban tablet 2.5 mg  2 times daily      02/27/19 0603     IP VTE HIGH RISK PATIENT  Once      02/26/19 2116          ADRIAN Knott, ANP  Cardiology  [facility]   Negative

## 2021-03-01 ENCOUNTER — TELEPHONE (OUTPATIENT)
Dept: CARDIOLOGY | Facility: CLINIC | Age: 86
End: 2021-03-01

## 2021-03-01 DIAGNOSIS — E03.9 ACQUIRED HYPOTHYROIDISM: ICD-10-CM

## 2021-03-01 DIAGNOSIS — E11.59 HYPERTENSION ASSOCIATED WITH DIABETES: Primary | ICD-10-CM

## 2021-03-01 DIAGNOSIS — E11.40 TYPE 2 DIABETES, CONTROLLED, WITH NEUROPATHY: ICD-10-CM

## 2021-03-01 DIAGNOSIS — E78.2 MIXED HYPERLIPIDEMIA: ICD-10-CM

## 2021-03-01 DIAGNOSIS — I15.2 HYPERTENSION ASSOCIATED WITH DIABETES: Primary | ICD-10-CM

## 2021-07-19 ENCOUNTER — TELEPHONE (OUTPATIENT)
Dept: UROLOGY | Facility: CLINIC | Age: 86
End: 2021-07-19

## 2021-09-21 NOTE — SUBJECTIVE & OBJECTIVE
Interval History: low grade fever, awake and alert. daughter by bedside. Updated patient and daughter on clinical course  Wbc improved,. Switch lasix to po  CXR reported improving pneumonia. Continue Rocephin/ Flagyl   Blood cx NGTD,       Review of Systems   Constitutional: Negative for chills and fever.   Respiratory: Positive for cough and shortness of breath. Negative for chest tightness and wheezing.    Cardiovascular: Positive for leg swelling. Negative for chest pain and palpitations.   Gastrointestinal: Negative for abdominal pain, diarrhea, nausea and vomiting.   Genitourinary: Negative for dysuria, flank pain and hematuria.   Musculoskeletal: Negative for back pain, myalgias and neck pain.   Skin: Negative for rash and wound.   Neurological: Positive for weakness. Negative for dizziness, syncope and headaches.   Psychiatric/Behavioral: Negative for agitation.     Objective:     Vital Signs (Most Recent):  Temp: 99.8 °F (37.7 °C) (03/01/19 0457)  Pulse: 67 (03/01/19 0457)  Resp: 14 (03/01/19 0457)  BP: (!) 146/69 (03/01/19 0457)  SpO2: (!) 94 % (03/01/19 0434) Vital Signs (24h Range):  Temp:  [97.1 °F (36.2 °C)-101.3 °F (38.5 °C)] 99.8 °F (37.7 °C)  Pulse:  [56-68] 67  Resp:  [14-24] 14  SpO2:  [93 %-96 %] 94 %  BP: (136-164)/(63-73) 146/69     Weight: 83.7 kg (184 lb 8.4 oz)  Body mass index is 28.06 kg/m².    Intake/Output Summary (Last 24 hours) at 3/1/2019 0719  Last data filed at 3/1/2019 0500  Gross per 24 hour   Intake 1285 ml   Output 2750 ml   Net -1465 ml      Physical Exam   Constitutional: He is oriented to person, place, and time. He appears well-developed and well-nourished. No distress.   HENT:   Head: Normocephalic and atraumatic.   Eyes: Conjunctivae are normal. Pupils are equal, round, and reactive to light.   Neck: Normal range of motion. Neck supple. No JVD present.   Cardiovascular: Normal rate, regular rhythm and intact distal pulses.   No murmur heard.  Pulmonary/Chest: Effort normal  and breath sounds normal. No respiratory distress. He has no wheezes.   Abdominal: Soft. Bowel sounds are normal. He exhibits no distension. There is no tenderness. There is no guarding.   Musculoskeletal: Normal range of motion. He exhibits edema (1+ bilateral lower extremities ). He exhibits no tenderness.   Neurological: He is alert and oriented to person, place, and time.   Skin: Skin is warm and dry. Capillary refill takes less than 2 seconds. No erythema.   Psychiatric: He has a normal mood and affect. His behavior is normal.       Significant Labs:   Blood Culture:   Recent Labs   Lab 02/27/19  1645   LABBLOO No Growth to date  No Growth to date     CBC:   Recent Labs   Lab 02/28/19  0734   WBC 10.96   HGB 10.9*   HCT 32.6*        CMP:   Recent Labs   Lab 02/28/19  0734   *   K 3.0*   CL 96   CO2 31*   *   BUN 20   CREATININE 1.4   CALCIUM 8.5*   ANIONGAP 6*   EGFRNONAA 43*     Lactic Acid:   Recent Labs   Lab 02/27/19  1648   LACTATE 0.9     Magnesium: No results for input(s): MG in the last 48 hours.  Prealbumin: No results for input(s): PREALBUMIN in the last 48 hours.  TSH:   Recent Labs   Lab 02/22/19  0932   TSH 4.356*       Significant Imaging: none   21-Sep-2021 09:41

## 2021-10-22 NOTE — DISCHARGE INSTRUCTIONS
Discharge Instructions for Atrial Fibrillation  You have been diagnosed with an abnormal heart rhythm called atrial fibrillation. With this condition, your hearts 2 upper chambers quiver rather than squeeze the blood out in a normal pattern. This leads to an irregular and sometimes rapid heartbeat. Some people will develop associated symptoms such as a flip-flopping heartbeat, chest pain, lightheadedness, or shortness of breath. Other people may have no symptoms at all. Atrial fibrillation is serious because it affects the hearts ability to fill with blood as it should. Blood clots may form. This increases the risk for stroke. Untreated atrial fibrillation can also lead to heart failure. Atrial fibrillation can be controlled. With treatment, most people with atrial fibrillation lead normal lives.  Treatment options  Recommended treatment for atrial fibrillation depends on your age, symptoms, how long you have had atrial fibrillation, and other factors. You will have a complete evaluation to find out if you have any abnormalities that caused your heart to go into atrial fibrillation. This might be blocked heart arteries or a thyroid problem. Your doctor will assess your particular case and discuss choices with you.  Treatment choices may include:  · Treating an underlying disorder that puts you at risk for atrial fibrillation. For example, correcting an abnormal thyroid or electrolyte problem, or treating a blocked heart artery.  · Restoring a normal heart rhythm with an electrical shock (cardioversion) or with an antiarrhythmic medicine (chemical cardioversion)  · Using medicine to control your heart rate in atrial fibrillation.  · Preventing the risk for blood clot and stroke using blood-thinning medicines. Your doctor will tell you what he or she recommends. Choices may include aspirin, clopidogrel, warfarin, dabigatran, rivaroxaban, apixaban, and edoxaban.  · Doing catheter ablation or a surgical maze  procedure. These use different methods to destroy certain areas of heart tissue. This interrupts the electrical signals causing atrial fibrillation. One of these procedures may be a choice when medicines do not work, or as an alternative to long-term medicine.  · Other treatment choices may be recommended for you by your doctor.  Managing risk factors for stroke and preventing heart failure are important parts of any treatment plan for atrial fibrillation.  Home care  · Take your medicines exactly as directed. Dont skip doses.  · Work with your doctor to find the right medicines and doses for you.  · Learn to take your own pulse. Keep a record of your results. Ask your doctor which pulse rates mean that you need medical attention. Slowing your pulse is often the goal of treatment. Ask your doctor if its OK for you to use an automatic machine to check your pulse at home. Sometimes these machines dont count the pulse correctly when you have atrial fibrillation.  · Limit your intake of coffee, tea, cola, and other beverages with caffeine. Talk with your doctor about whether you should eliminate caffeine.  · Avoid over-the-counter medicines that have caffeine in them.  · Let your doctor know what medicines you take, including prescription and over-the-counter medicines, as well as any supplements. They interfere with some medicines given for atrial fibrillation.  · Ask your doctor about whether you can drink alcohol. Some people need to avoid alcohol to better treat atrial fibrillation. If you are taking blood-thinner medicines, alcohol may interfere with them by increasing their effect.  · Never take stimulants such as amphetamines or cocaine. These drugs can speed up your heart rate and trigger atrial fibrillation.  Follow-up care  Follow up with your doctor, or as advised.     When should I call my healthcare provider  Call your healthcare provider right away if you have any of the  following:  · Weakness  · Dizziness  · Fainting  · Fatigue  · Shortness of breath  · Chest pain with increased activity  · A change in the usual regularity of your heartbeat, or an unusually fast heartbeat   Date Last Reviewed: 4/23/2016 © 2000-2016 [employer]. [address] [place] [address]. All rights reserved. This information is not intended as a substitute for professional medical care. Always follow your healthcare professional's instructions.        Understanding Heart Palpitations    Heart palpitations are a symptom. Its the feeling you have when your heartbeat seems to be racing, pounding, skipping, or fluttering. Heart palpitations are most often felt in the chest. Sometimes, they may also be felt in the neck.  What causes heart palpitations?  In most cases, heart palpitations are caused by:  · Stress or anxiety  · Exercise  · Pregnancy  · Some medicines  · Caffeine  · Nicotine  · Alcohol  · Illegal drugs, such as cocaine  · Health problems, such as anemia or overactive thyroid  In some cases, heart palpitations may be caused by a problem with the heart. Abnormal heart rhythms (arrhythmias) are the main concern. They may need to be managed by you and your healthcare provider or treated right away.  How are heart palpitations treated?  Treatments for heart palpitations depend on the cause. Options may include:  · Managing the things that trigger your heart palpitations. This could mean:  ¨ Learning ways to reduce stress and anxiety  ¨ Avoiding caffeine, nicotine, alcohol, or illegal drugs  ¨ Stopping the use of certain medicines, under your doctors guidance  · Medicines, procedures, or surgery to treat an arrhythmia or other health problem that is causing your symptoms  What are the complications of heart palpitations?  Complications of heart palpitations are rare unless they are caused by a problem such as an arrhythmia. In such cases, complications can  include:  · Fainting  · Heart failure. This problem occurs when the heart is so weak it no longer pumps blood well.  · Blood clots and stroke  · Sudden cardiac arrest. This problem occurs when the heart suddenly stops beating.  When should I call my healthcare provider?  Call your healthcare provider right away if you have any of these:  · Fever of 100.4°F (38°C) or higher, or as directed  · Symptoms that dont get better with treatment, or symptoms that get worse  · New symptoms, such as chest pain, shortness of breath, dizziness, or fainting   Date Last Reviewed: 5/1/2016  © 3028-7475 .[employer]. [address] [place] [address], [place] [address]. All rights reserved. This information is not intended as a substitute for professional medical care. Always follow your healthcare professional's instructions.         never used

## 2021-10-30 ENCOUNTER — HOSPITAL ENCOUNTER (EMERGENCY)
Facility: HOSPITAL | Age: 86
Discharge: HOME | End: 2021-10-30
Attending: SURGERY
Payer: MEDICARE

## 2021-10-30 VITALS
SYSTOLIC BLOOD PRESSURE: 144 MMHG | WEIGHT: 136.38 LBS | TEMPERATURE: 96 F | HEART RATE: 48 BPM | OXYGEN SATURATION: 97 % | RESPIRATION RATE: 18 BRPM | DIASTOLIC BLOOD PRESSURE: 64 MMHG | BODY MASS INDEX: 21.36 KG/M2

## 2021-10-30 DIAGNOSIS — N30.00 ACUTE CYSTITIS WITHOUT HEMATURIA: Primary | ICD-10-CM

## 2021-10-30 LAB
ALBUMIN SERPL BCP-MCNC: 3 G/DL (ref 3.5–5.2)
ALP SERPL-CCNC: 89 U/L (ref 55–135)
ALT SERPL W/O P-5'-P-CCNC: 23 U/L (ref 10–44)
ANION GAP SERPL CALC-SCNC: 13 MMOL/L (ref 8–16)
AST SERPL-CCNC: 29 U/L (ref 10–40)
BACTERIA #/AREA URNS HPF: ABNORMAL /HPF
BASOPHILS # BLD AUTO: 0.04 K/UL (ref 0–0.2)
BASOPHILS NFR BLD: 0.6 % (ref 0–1.9)
BILIRUB SERPL-MCNC: 0.4 MG/DL (ref 0.1–1)
BILIRUB UR QL STRIP: NEGATIVE
BUN SERPL-MCNC: 19 MG/DL (ref 10–30)
CALCIUM SERPL-MCNC: 9.1 MG/DL (ref 8.7–10.5)
CHLORIDE SERPL-SCNC: 100 MMOL/L (ref 95–110)
CK MB SERPL-MCNC: 1 NG/ML (ref 0.1–6.5)
CK MB SERPL-MCNC: 1.1 NG/ML (ref 0.1–6.5)
CK MB SERPL-RTO: 1.9 % (ref 0–5)
CK MB SERPL-RTO: 2.2 % (ref 0–5)
CK SERPL-CCNC: 51 U/L (ref 20–200)
CK SERPL-CCNC: 51 U/L (ref 20–200)
CK SERPL-CCNC: 52 U/L (ref 20–200)
CK SERPL-CCNC: 52 U/L (ref 20–200)
CLARITY UR: ABNORMAL
CO2 SERPL-SCNC: 23 MMOL/L (ref 23–29)
COLOR UR: YELLOW
CREAT SERPL-MCNC: 1.4 MG/DL (ref 0.5–1.4)
DIFFERENTIAL METHOD: ABNORMAL
EOSINOPHIL # BLD AUTO: 0.1 K/UL (ref 0–0.5)
EOSINOPHIL NFR BLD: 0.7 % (ref 0–8)
ERYTHROCYTE [DISTWIDTH] IN BLOOD BY AUTOMATED COUNT: 14.8 % (ref 11.5–14.5)
EST. GFR  (AFRICAN AMERICAN): 49 ML/MIN/1.73 M^2
EST. GFR  (NON AFRICAN AMERICAN): 43 ML/MIN/1.73 M^2
GLUCOSE SERPL-MCNC: 117 MG/DL (ref 70–110)
GLUCOSE UR QL STRIP: NEGATIVE
HCT VFR BLD AUTO: 42.9 % (ref 40–54)
HGB BLD-MCNC: 13.9 G/DL (ref 14–18)
HGB UR QL STRIP: ABNORMAL
HYALINE CASTS #/AREA URNS LPF: 0 /LPF
IMM GRANULOCYTES # BLD AUTO: 0.04 K/UL (ref 0–0.04)
IMM GRANULOCYTES NFR BLD AUTO: 0.6 % (ref 0–0.5)
KETONES UR QL STRIP: NEGATIVE
LACTATE SERPL-SCNC: 1.4 MMOL/L (ref 0.5–2.2)
LEUKOCYTE ESTERASE UR QL STRIP: ABNORMAL
LYMPHOCYTES # BLD AUTO: 2 K/UL (ref 1–4.8)
LYMPHOCYTES NFR BLD: 30.1 % (ref 18–48)
MCH RBC QN AUTO: 32.2 PG (ref 27–31)
MCHC RBC AUTO-ENTMCNC: 32.4 G/DL (ref 32–36)
MCV RBC AUTO: 99 FL (ref 82–98)
MICROSCOPIC COMMENT: ABNORMAL
MONOCYTES # BLD AUTO: 0.5 K/UL (ref 0.3–1)
MONOCYTES NFR BLD: 7.6 % (ref 4–15)
NEUTROPHILS # BLD AUTO: 4 K/UL (ref 1.8–7.7)
NEUTROPHILS NFR BLD: 60.4 % (ref 38–73)
NITRITE UR QL STRIP: NEGATIVE
NRBC BLD-RTO: 0 /100 WBC
PH UR STRIP: 6 [PH] (ref 5–8)
PLATELET # BLD AUTO: 237 K/UL (ref 150–450)
PMV BLD AUTO: 9.8 FL (ref 9.2–12.9)
POTASSIUM SERPL-SCNC: 4 MMOL/L (ref 3.5–5.1)
PROCALCITONIN SERPL IA-MCNC: 0.07 NG/ML
PROT SERPL-MCNC: 7.7 G/DL (ref 6–8.4)
PROT UR QL STRIP: ABNORMAL
RBC # BLD AUTO: 4.32 M/UL (ref 4.6–6.2)
RBC #/AREA URNS HPF: 7 /HPF (ref 0–4)
SARS-COV-2 RDRP RESP QL NAA+PROBE: NEGATIVE
SODIUM SERPL-SCNC: 136 MMOL/L (ref 136–145)
SP GR UR STRIP: 1.02 (ref 1–1.03)
TROPONIN I SERPL DL<=0.01 NG/ML-MCNC: 0.01 NG/ML (ref 0–0.03)
TROPONIN I SERPL DL<=0.01 NG/ML-MCNC: 0.03 NG/ML (ref 0–0.03)
URN SPEC COLLECT METH UR: ABNORMAL
UROBILINOGEN UR STRIP-ACNC: NEGATIVE EU/DL
WBC # BLD AUTO: 6.68 K/UL (ref 3.9–12.7)
WBC #/AREA URNS HPF: >100 /HPF (ref 0–5)

## 2021-10-30 PROCEDURE — 93005 ELECTROCARDIOGRAM TRACING: CPT | Mod: 59

## 2021-10-30 PROCEDURE — 87086 URINE CULTURE/COLONY COUNT: CPT | Performed by: SURGERY

## 2021-10-30 PROCEDURE — 84145 PROCALCITONIN (PCT): CPT | Performed by: SURGERY

## 2021-10-30 PROCEDURE — 63600175 PHARM REV CODE 636 W HCPCS: Performed by: SURGERY

## 2021-10-30 PROCEDURE — 85025 COMPLETE CBC W/AUTO DIFF WBC: CPT | Performed by: SURGERY

## 2021-10-30 PROCEDURE — 82553 CREATINE MB FRACTION: CPT | Mod: 91 | Performed by: SURGERY

## 2021-10-30 PROCEDURE — 87088 URINE BACTERIA CULTURE: CPT | Performed by: SURGERY

## 2021-10-30 PROCEDURE — 87077 CULTURE AEROBIC IDENTIFY: CPT | Performed by: SURGERY

## 2021-10-30 PROCEDURE — U0002 COVID-19 LAB TEST NON-CDC: HCPCS | Performed by: SURGERY

## 2021-10-30 PROCEDURE — 87186 SC STD MICRODIL/AGAR DIL: CPT | Performed by: SURGERY

## 2021-10-30 PROCEDURE — 80053 COMPREHEN METABOLIC PANEL: CPT | Performed by: SURGERY

## 2021-10-30 PROCEDURE — 99285 EMERGENCY DEPT VISIT HI MDM: CPT | Mod: 25

## 2021-10-30 PROCEDURE — 84484 ASSAY OF TROPONIN QUANT: CPT | Mod: 91 | Performed by: SURGERY

## 2021-10-30 PROCEDURE — 87040 BLOOD CULTURE FOR BACTERIA: CPT | Performed by: SURGERY

## 2021-10-30 PROCEDURE — 83605 ASSAY OF LACTIC ACID: CPT | Performed by: SURGERY

## 2021-10-30 PROCEDURE — 51702 INSERT TEMP BLADDER CATH: CPT

## 2021-10-30 PROCEDURE — 96365 THER/PROPH/DIAG IV INF INIT: CPT | Mod: 59

## 2021-10-30 PROCEDURE — 93010 ELECTROCARDIOGRAM REPORT: CPT | Mod: ,,, | Performed by: INTERNAL MEDICINE

## 2021-10-30 PROCEDURE — 96361 HYDRATE IV INFUSION ADD-ON: CPT

## 2021-10-30 PROCEDURE — 36415 COLL VENOUS BLD VENIPUNCTURE: CPT | Performed by: SURGERY

## 2021-10-30 PROCEDURE — 93010 EKG 12-LEAD: ICD-10-PCS | Mod: ,,, | Performed by: INTERNAL MEDICINE

## 2021-10-30 PROCEDURE — 25000003 PHARM REV CODE 250: Performed by: SURGERY

## 2021-10-30 PROCEDURE — 81000 URINALYSIS NONAUTO W/SCOPE: CPT | Performed by: SURGERY

## 2021-10-30 RX ORDER — SULFAMETHOXAZOLE AND TRIMETHOPRIM 800; 160 MG/1; MG/1
1 TABLET ORAL 2 TIMES DAILY
Qty: 14 TABLET | Refills: 0 | Status: SHIPPED | OUTPATIENT
Start: 2021-10-30 | End: 2021-10-30 | Stop reason: SDUPTHER

## 2021-10-30 RX ORDER — SODIUM CHLORIDE 9 MG/ML
INJECTION, SOLUTION INTRAVENOUS CONTINUOUS
Status: DISCONTINUED | OUTPATIENT
Start: 2021-10-30 | End: 2021-10-31 | Stop reason: HOSPADM

## 2021-10-30 RX ORDER — SULFAMETHOXAZOLE AND TRIMETHOPRIM 800; 160 MG/1; MG/1
1 TABLET ORAL 2 TIMES DAILY
Qty: 14 TABLET | Refills: 0 | Status: SHIPPED | OUTPATIENT
Start: 2021-10-30 | End: 2021-11-06

## 2021-10-30 RX ORDER — ACETAMINOPHEN 500 MG
1000 TABLET ORAL
Status: COMPLETED | OUTPATIENT
Start: 2021-10-30 | End: 2021-10-30

## 2021-10-30 RX ADMIN — SODIUM CHLORIDE: 0.9 INJECTION, SOLUTION INTRAVENOUS at 06:10

## 2021-10-30 RX ADMIN — CEFTRIAXONE SODIUM 2 G: 2 INJECTION, POWDER, FOR SOLUTION INTRAMUSCULAR; INTRAVENOUS at 06:10

## 2021-10-30 RX ADMIN — ACETAMINOPHEN 1000 MG: 500 TABLET ORAL at 07:10

## 2021-11-02 LAB — BACTERIA UR CULT: ABNORMAL

## 2021-11-04 LAB
BACTERIA BLD CULT: NORMAL
BACTERIA BLD CULT: NORMAL

## 2021-11-15 NOTE — DISCHARGE SUMMARY
"[facility] Hospital Medicine  Discharge Summary      Patient Name: Aleksandr Schultz  MRN: [id number]  Admission Date: 2/26/2019  Hospital Length of Stay: 4 days  Discharge Date and Time: 3/2/2019  4:27 PM  Attending Physician: Robyn Pacheco*   Discharging Provider: Robyn Pacheco MD  Primary Care Provider: Ascencion Cedeño MD      HPI:   Aleksandr Schultz (Jimmy) is a 89 yo male with HTN, HLD, CAD, chronic diastolic  Dysfunction, bilateral carotid artery disease, CVA, AAA, PAD, paroxysmal atrial fibrillation, type 2 diabetes mellitus, nonrheumatic aortic valve stenosis, sinus noemy-tachy syndrome and obstructive sleep apnea. Per daughter at bedside patient  AAOx3 at baseline, very active and still drives. He lives in [place], [place]. His primary care physician is Dr. Ascencion Cedeño. His cardiologist is Dr. Lynne Buckner.  He presented to [facility] 2/26/19 with complaint of worsening SOB/GONZALEZ and bilateral lower extremity edema over the past 3 days. He attended a birthday party on Saturday, 2/23 . He ate salty food. On Sunday, 2/24, he felt weak and short of breath.His daughter took him to ER where he was checked, but except for O2 sats of 91-93% they found no abnormalities.  Medications were not changed at the time. The patient followed up with his cardiologist on 2/25. His lasix was increased to 40 mg BID at the time. Per daughter at bedside patient with ongoing SOB, worse than baseline, lethargy and lower extremity  edema despite increased po lasix. He has also had decreased urinary output, non-productive cough and very little sleep due to SOB when lying down.   Labs in ED remarkable for WBC (12.80), na (128), AST (95), ALT (100), BNP (416). Troponin negative. CXR shows course asymmetric opacities seen within the left lower lung zone.  This may represent chronic change or atelectasis although difficult to exclude potential aspiration or developing pneumonia in the right clinical " Physical Therapy  Patient not seen in therapy.     Unavailable due to request no therapy today.      Re-attempt plan: tomorrow                                   setting.  No focal consolidation seen. Low grade temp (99.8) on presentation, BP stable. Patient received 80 mg IV lasix in ED. Patient admitted to [facility] for further care.       * No surgery found *      Hospital Course:   CXR reports possible aspiration Pneumonia. Start Rocephin and Flagyl       2/28 TSH has been chronically low since 2017 with no meds on record. Start synthroid. Repeat CXR in AM, PT/OT  3/1 switch Lasix to PO, replace K for possible d/c tomorrow  3/2 discharge today     Consults:   Consults (From admission, onward)        Status Ordering Provider     Inpatient consult to Cardiology  Once     Provider:  Kemal Morejon MD    Completed AGNES BOURGEOIS     Inpatient consult to Registered Dietitian/Nutritionist  Once     Provider:  (Not yet assigned)    KEMAL Cazares          Acquired hypothyroidism    Chronic with no meds on home meds  Start Synthroid and monitor       Acute diastolic congestive heart failure    -continue lasix 40 mg BID   -strict intake/output and daily weights   -cardiac  Diet   -echo  -cardiology consulted per ED  -monitor tele        MICHELLE on CPAP    CPAP per home settings        Paroxysmal atrial fibrillation    Continue  Amiodarone and eliquis  Monitor tele        Type 2 diabetes, controlled, with neuropathy    A1C 6.8   Hold po medications   SSI   Low dose SSI   Accucheck AC&HS        COPD (chronic obstructive pulmonary disease)    Chronic   Continue supplemental O2, wean as tolerated   Pt not on home O2          Final Active Diagnoses:    Diagnosis Date Noted POA    Hypokalemia [E87.6] 02/28/2019 Yes    Acquired hypothyroidism [E03.9] 02/28/2019 Yes    Acute conjunctivitis of both eyes [H10.33] 02/27/2019 Yes    Acute diastolic congestive heart failure [I50.31] 02/26/2019 Yes    CKD (chronic kidney disease) stage 3, GFR 30-59 ml/min [N18.3] 02/25/2019 Yes    Hypertension associated with diabetes [E11.59, I10] 06/16/2017 Yes    MICHELLE on  CPAP [G47.33, Z99.89]  Not Applicable    Cerebrovascular accident (CVA) [I63.9] 05/26/2017 Yes    Paroxysmal atrial fibrillation [I48.0] 05/02/2017 Yes    Sinus noemy-tachy syndrome [I49.5] 05/02/2017 Yes    Type 2 diabetes, controlled, with neuropathy [E11.40] 08/21/2015 Yes    AAA (abdominal aortic aneurysm) [I71.4] 02/11/2015 Yes     Chronic    Bilateral carotid artery disease [I77.9] 02/11/2015 Yes     Chronic    COPD (chronic obstructive pulmonary disease) [J44.9] 02/11/2015 Yes     Chronic    Nonrheumatic aortic valve stenosis [I35.0] 02/11/2015 Yes    Hyperlipidemia [E78.5] 02/11/2015 Yes    PAD (peripheral artery disease) [I73.9] 12/12/2012 Yes      Problems Resolved During this Admission:    Diagnosis Date Noted Date Resolved POA    PRINCIPAL PROBLEM:  Aspiration pneumonia [J69.0] 02/27/2019 03/02/2019 Yes       Discharged Condition: stable    Disposition: Home or Self Care    Follow Up:  Follow-up Information     Ascencion Cedeño MD. Schedule an appointment as soon as possible for a visit in 2 weeks.    Specialty:  Internal Medicine  Contact information:  [address] [place], [place] [address]KEVIN [facility]  [place] [id number] [phone]                 Patient Instructions:      Diet Cardiac     Diet diabetic     Activity as tolerated       Significant Diagnostic Studies:     Pending Diagnostic Studies:     None         Medications:  Reconciled Home Medications:      Medication List      START taking these medications    acetaminophen 325 MG tablet  Commonly known as:  TYLENOL  Take 2 tablets (650 mg total) by mouth every 4 (four) hours as needed.     amoxicillin-clavulanate 875-125mg 875-125 mg per tablet  Commonly known as:  AUGMENTIN  Take 1 tablet by mouth every 12 (twelve) hours. for 4 days     erythromycin ophthalmic ointment  Commonly known as:  ROMYCIN  Place into both eyes every 6 (six) hours.     levothyroxine 50 MCG tablet  Commonly known as:  SYNTHROID  Take 1 tablet (50 mcg total) by mouth before breakfast.  Start  taking on:  3/3/2019     magnesium hydroxide 400 mg/5 ml 400 mg/5 mL Susp  Commonly known as:  MILK OF MAGNESIA  Take 30 mLs (2,400 mg total) by mouth daily as needed.     potassium chloride 10% 20 mEq/15 mL oral solution  Commonly known as:  KAYCIEL  Take 30 mLs (40 mEq total) by mouth once daily.  Replaces:  potassium chloride 10 MEQ Cpsr        CHANGE how you take these medications    furosemide 40 MG tablet  Commonly known as:  LASIX  Take 1 tablet (40 mg total) by mouth 2 (two) times daily.  What changed:  when to take this        CONTINUE taking these medications    albuterol 2.5 mg /3 mL (0.083 %) nebulizer solution  Commonly known as:  PROVENTIL  Take 2.5 mg by nebulization 4 (four) times daily as needed.     amiodarone 200 MG Tab  Commonly known as:  PACERONE  TAKE 1 TABLET ONE TIME DAILY. TAKE AT NIGHT PER DR. LIPSCOMB ON 6/23/17.     amLODIPine 10 MG tablet  Commonly known as:  NORVASC  TAKE 1 TABLET EVERY DAY     aspirin 81 MG EC tablet  Commonly known as:  ECOTRIN  Take 81 mg by mouth once daily.     atorvastatin 40 MG tablet  Commonly known as:  LIPITOR  Take 40 mg by mouth once daily.     cyclobenzaprine 10 MG tablet  Commonly known as:  FLEXERIL  Take 10 mg by mouth 3 (three) times daily as needed. Pt taking one pill at night.     ELIQUIS 2.5 mg Tab  Generic drug:  apixaban  TAKE 1 TABLET TWICE DAILY     FLOMAX 0.4 mg Cap  Generic drug:  tamsulosin  Take 0.8 mg by mouth every evening.     glimepiride 4 MG tablet  Commonly known as:  AMARYL  Take 4 mg by mouth 2 (two) times daily.     hydrALAZINE 25 MG tablet  Commonly known as:  APRESOLINE  Take 1 tablet (25 mg total) by mouth every 8 (eight) hours.     ipratropium 0.02 % nebulizer solution  Commonly known as:  ATROVENT  Take 500 mcg by nebulization 4 (four) times daily. PRN     loratadine 10 mg Cap  Take 1 tablet by mouth once daily.     metoprolol tartrate 50 MG tablet  Commonly known as:  LOPRESSOR  Take 50 mg by mouth 2 (two) times daily.      mometasone 50 mcg/actuation nasal spray  Commonly known as:  NASONEX  2 sprays by Nasal route daily as needed.     nitroGLYCERIN 0.4 MG SL tablet  Commonly known as:  NITROSTAT  Place 0.4 mg under the tongue every 5 (five) minutes as needed (PRN).     pioglitazone 15 MG tablet  Commonly known as:  ACTOS  Take 15 mg by mouth once daily.     ranitidine 150 MG tablet  Commonly known as:  ZANTAC  Take 150 mg by mouth 2 (two) times daily. Pt taking prn.     vitamin D 1000 units Tab  Commonly known as:  VITAMIN D3  Take 1,000 Units by mouth once daily.        STOP taking these medications    potassium chloride 10 MEQ Cpsr  Commonly known as:  MICRO-K  Replaced by:  potassium chloride 10% 20 mEq/15 mL oral solution            Indwelling Lines/Drains at time of discharge:   Lines/Drains/Airways     Drain            Male External Urinary Catheter 02/27/19 1500 2 days                Time spent on the discharge of patient: 55 minutes  Patient was seen and examined on the date of discharge and determined to be suitable for discharge.         Robyn Pacheco MD  Department of Hospital Medicine  [facility]

## 2022-02-09 ENCOUNTER — HOSPITAL ENCOUNTER (EMERGENCY)
Facility: HOSPITAL | Age: 87
Discharge: ANOTHER HOSPITAL | End: 2022-02-10
Attending: SURGERY
Payer: MEDICARE

## 2022-02-09 DIAGNOSIS — I44.2 IDIOVENTRICULAR RHYTHM: ICD-10-CM

## 2022-02-09 DIAGNOSIS — N30.00 ACUTE CYSTITIS WITHOUT HEMATURIA: ICD-10-CM

## 2022-02-09 DIAGNOSIS — I49.9 ARRHYTHMIA: Primary | ICD-10-CM

## 2022-02-09 DIAGNOSIS — E87.1 HYPONATREMIA: ICD-10-CM

## 2022-02-09 LAB
ALBUMIN SERPL BCP-MCNC: 3.3 G/DL (ref 3.5–5.2)
ALP SERPL-CCNC: 95 U/L (ref 55–135)
ALT SERPL W/O P-5'-P-CCNC: 276 U/L (ref 10–44)
ANION GAP SERPL CALC-SCNC: 8 MMOL/L (ref 8–16)
AST SERPL-CCNC: 209 U/L (ref 10–40)
BACTERIA #/AREA URNS HPF: ABNORMAL /HPF
BASOPHILS # BLD AUTO: 0.03 K/UL (ref 0–0.2)
BASOPHILS NFR BLD: 0.5 % (ref 0–1.9)
BILIRUB SERPL-MCNC: 0.4 MG/DL (ref 0.1–1)
BILIRUB UR QL STRIP: NEGATIVE
BNP SERPL-MCNC: 586 PG/ML (ref 0–99)
BUN SERPL-MCNC: 28 MG/DL (ref 10–30)
CALCIUM SERPL-MCNC: 8.9 MG/DL (ref 8.7–10.5)
CHLORIDE SERPL-SCNC: 86 MMOL/L (ref 95–110)
CK MB SERPL-MCNC: 2.2 NG/ML (ref 0.1–6.5)
CK MB SERPL-RTO: 2.6 % (ref 0–5)
CK SERPL-CCNC: 84 U/L (ref 20–200)
CK SERPL-CCNC: 84 U/L (ref 20–200)
CLARITY UR: ABNORMAL
CO2 SERPL-SCNC: 24 MMOL/L (ref 23–29)
COLOR UR: YELLOW
CREAT SERPL-MCNC: 1.6 MG/DL (ref 0.5–1.4)
DIFFERENTIAL METHOD: ABNORMAL
EOSINOPHIL # BLD AUTO: 0.1 K/UL (ref 0–0.5)
EOSINOPHIL NFR BLD: 2.4 % (ref 0–8)
ERYTHROCYTE [DISTWIDTH] IN BLOOD BY AUTOMATED COUNT: 14.7 % (ref 11.5–14.5)
EST. GFR  (AFRICAN AMERICAN): 42 ML/MIN/1.73 M^2
EST. GFR  (NON AFRICAN AMERICAN): 36 ML/MIN/1.73 M^2
GLUCOSE SERPL-MCNC: 108 MG/DL (ref 70–110)
GLUCOSE UR QL STRIP: NEGATIVE
HCT VFR BLD AUTO: 32.5 % (ref 40–54)
HGB BLD-MCNC: 11.4 G/DL (ref 14–18)
HGB UR QL STRIP: ABNORMAL
IMM GRANULOCYTES # BLD AUTO: 0.03 K/UL (ref 0–0.04)
IMM GRANULOCYTES NFR BLD AUTO: 0.5 % (ref 0–0.5)
INFLUENZA A, MOLECULAR: NEGATIVE
INFLUENZA B, MOLECULAR: NEGATIVE
KETONES UR QL STRIP: NEGATIVE
LACTATE SERPL-SCNC: 0.9 MMOL/L (ref 0.5–2.2)
LEUKOCYTE ESTERASE UR QL STRIP: ABNORMAL
LYMPHOCYTES # BLD AUTO: 1.8 K/UL (ref 1–4.8)
LYMPHOCYTES NFR BLD: 29.8 % (ref 18–48)
MAGNESIUM SERPL-MCNC: 1.9 MG/DL (ref 1.6–2.6)
MCH RBC QN AUTO: 32.8 PG (ref 27–31)
MCHC RBC AUTO-ENTMCNC: 35.1 G/DL (ref 32–36)
MCV RBC AUTO: 93 FL (ref 82–98)
MICROSCOPIC COMMENT: ABNORMAL
MONOCYTES # BLD AUTO: 0.6 K/UL (ref 0.3–1)
MONOCYTES NFR BLD: 9.5 % (ref 4–15)
NEUTROPHILS # BLD AUTO: 3.4 K/UL (ref 1.8–7.7)
NEUTROPHILS NFR BLD: 57.3 % (ref 38–73)
NITRITE UR QL STRIP: NEGATIVE
NRBC BLD-RTO: 0 /100 WBC
PH UR STRIP: 6 [PH] (ref 5–8)
PHOSPHATE SERPL-MCNC: 3.9 MG/DL (ref 2.7–4.5)
PLATELET # BLD AUTO: 195 K/UL (ref 150–450)
PMV BLD AUTO: 9.4 FL (ref 9.2–12.9)
POTASSIUM SERPL-SCNC: 5 MMOL/L (ref 3.5–5.1)
PROCALCITONIN SERPL IA-MCNC: 0.42 NG/ML
PROT SERPL-MCNC: 7.2 G/DL (ref 6–8.4)
PROT UR QL STRIP: NEGATIVE
RBC # BLD AUTO: 3.48 M/UL (ref 4.6–6.2)
RBC #/AREA URNS HPF: >100 /HPF (ref 0–4)
SARS-COV-2 RDRP RESP QL NAA+PROBE: NEGATIVE
SODIUM SERPL-SCNC: 118 MMOL/L (ref 136–145)
SP GR UR STRIP: 1.01 (ref 1–1.03)
SPECIMEN SOURCE: NORMAL
T4 FREE SERPL-MCNC: 1.2 NG/DL (ref 0.71–1.51)
TROPONIN I SERPL DL<=0.01 NG/ML-MCNC: 0.01 NG/ML (ref 0–0.03)
TSH SERPL DL<=0.005 MIU/L-ACNC: 6.43 UIU/ML (ref 0.4–4)
URN SPEC COLLECT METH UR: ABNORMAL
UROBILINOGEN UR STRIP-ACNC: NEGATIVE EU/DL
WBC # BLD AUTO: 5.91 K/UL (ref 3.9–12.7)
WBC #/AREA URNS HPF: 20 /HPF (ref 0–5)
YEAST URNS QL MICRO: ABNORMAL

## 2022-02-09 PROCEDURE — 93010 EKG 12-LEAD: ICD-10-PCS | Mod: ,,, | Performed by: INTERNAL MEDICINE

## 2022-02-09 PROCEDURE — 84100 ASSAY OF PHOSPHORUS: CPT | Performed by: SURGERY

## 2022-02-09 PROCEDURE — 96361 HYDRATE IV INFUSION ADD-ON: CPT

## 2022-02-09 PROCEDURE — 81000 URINALYSIS NONAUTO W/SCOPE: CPT | Performed by: SURGERY

## 2022-02-09 PROCEDURE — 87502 INFLUENZA DNA AMP PROBE: CPT | Performed by: SURGERY

## 2022-02-09 PROCEDURE — 84484 ASSAY OF TROPONIN QUANT: CPT | Performed by: SURGERY

## 2022-02-09 PROCEDURE — 93010 ELECTROCARDIOGRAM REPORT: CPT | Mod: 76,,, | Performed by: INTERNAL MEDICINE

## 2022-02-09 PROCEDURE — 96375 TX/PRO/DX INJ NEW DRUG ADDON: CPT

## 2022-02-09 PROCEDURE — 99285 EMERGENCY DEPT VISIT HI MDM: CPT | Mod: 25

## 2022-02-09 PROCEDURE — 87086 URINE CULTURE/COLONY COUNT: CPT | Performed by: SURGERY

## 2022-02-09 PROCEDURE — 96365 THER/PROPH/DIAG IV INF INIT: CPT

## 2022-02-09 PROCEDURE — U0002 COVID-19 LAB TEST NON-CDC: HCPCS | Performed by: SURGERY

## 2022-02-09 PROCEDURE — 25000003 PHARM REV CODE 250: Performed by: SURGERY

## 2022-02-09 PROCEDURE — 80053 COMPREHEN METABOLIC PANEL: CPT | Performed by: SURGERY

## 2022-02-09 PROCEDURE — 87088 URINE BACTERIA CULTURE: CPT | Performed by: SURGERY

## 2022-02-09 PROCEDURE — 99291 CRITICAL CARE FIRST HOUR: CPT

## 2022-02-09 PROCEDURE — 93005 ELECTROCARDIOGRAM TRACING: CPT

## 2022-02-09 PROCEDURE — 83880 ASSAY OF NATRIURETIC PEPTIDE: CPT | Performed by: SURGERY

## 2022-02-09 PROCEDURE — 36415 COLL VENOUS BLD VENIPUNCTURE: CPT | Performed by: SURGERY

## 2022-02-09 PROCEDURE — 87040 BLOOD CULTURE FOR BACTERIA: CPT | Mod: 59 | Performed by: SURGERY

## 2022-02-09 PROCEDURE — 84145 PROCALCITONIN (PCT): CPT | Performed by: SURGERY

## 2022-02-09 PROCEDURE — 87077 CULTURE AEROBIC IDENTIFY: CPT | Performed by: SURGERY

## 2022-02-09 PROCEDURE — 84439 ASSAY OF FREE THYROXINE: CPT | Performed by: SURGERY

## 2022-02-09 PROCEDURE — 82553 CREATINE MB FRACTION: CPT | Performed by: SURGERY

## 2022-02-09 PROCEDURE — 84443 ASSAY THYROID STIM HORMONE: CPT | Performed by: SURGERY

## 2022-02-09 PROCEDURE — 87186 SC STD MICRODIL/AGAR DIL: CPT | Performed by: SURGERY

## 2022-02-09 PROCEDURE — 85025 COMPLETE CBC W/AUTO DIFF WBC: CPT | Performed by: SURGERY

## 2022-02-09 PROCEDURE — 63600175 PHARM REV CODE 636 W HCPCS: Performed by: FAMILY MEDICINE

## 2022-02-09 PROCEDURE — 63600175 PHARM REV CODE 636 W HCPCS: Performed by: SURGERY

## 2022-02-09 PROCEDURE — 83735 ASSAY OF MAGNESIUM: CPT | Performed by: SURGERY

## 2022-02-09 PROCEDURE — 83605 ASSAY OF LACTIC ACID: CPT | Performed by: SURGERY

## 2022-02-09 RX ORDER — SODIUM CHLORIDE 9 MG/ML
INJECTION, SOLUTION INTRAVENOUS CONTINUOUS
Status: DISCONTINUED | OUTPATIENT
Start: 2022-02-09 | End: 2022-02-10 | Stop reason: HOSPADM

## 2022-02-09 RX ORDER — TOLTERODINE 2 MG/1
50 CAPSULE, EXTENDED RELEASE ORAL 2 TIMES DAILY
COMMUNITY
Start: 2022-01-24

## 2022-02-09 RX ORDER — FLUCONAZOLE 150 MG/1
150 TABLET ORAL
Status: COMPLETED | OUTPATIENT
Start: 2022-02-09 | End: 2022-02-09

## 2022-02-09 RX ORDER — SODIUM CHLORIDE 9 MG/ML
INJECTION, SOLUTION INTRAVENOUS
Status: COMPLETED | OUTPATIENT
Start: 2022-02-09 | End: 2022-02-09

## 2022-02-09 RX ORDER — LORAZEPAM 2 MG/ML
1 INJECTION INTRAMUSCULAR
Status: COMPLETED | OUTPATIENT
Start: 2022-02-09 | End: 2022-02-09

## 2022-02-09 RX ADMIN — FLUCONAZOLE 150 MG: 150 TABLET ORAL at 06:02

## 2022-02-09 RX ADMIN — SODIUM CHLORIDE: 0.9 INJECTION, SOLUTION INTRAVENOUS at 02:02

## 2022-02-09 RX ADMIN — CEFTRIAXONE 2 G: 2 INJECTION, SOLUTION INTRAVENOUS at 04:02

## 2022-02-09 RX ADMIN — LORAZEPAM 1 MG: 2 INJECTION INTRAMUSCULAR; INTRAVENOUS at 09:02

## 2022-02-09 RX ADMIN — SODIUM CHLORIDE: 0.9 INJECTION, SOLUTION INTRAVENOUS at 04:02

## 2022-02-09 NOTE — ED PROVIDER NOTES
[facility]                                                 I reviewed the ER triage nurse's note before evaluating the patient    Chief Complaint  89 y.o. male with Altered Mental Status   -- 89 y.o. male presents with altered mental status, daughter at bedside   -- States he has been acting confused and he has been on abx for 12 days   -- On Bactrim for urinary tract infection, chronic indwelling Paulino catheter    History of Present Illness  Aleksandr Schultz presents to the emergency room with continued confusion today  Patient seen in another emergency room 12 days ago with Bactrim antibiotic Rx  Patient also has an indwelling Paulino catheter, continued confusion since UTI DX  Additionally the patient has had issues in the past with hyponatremia and confusion  Daughter states the patient has been having significant decline since that diagnosis  Additionally, the patient's heart rate went to 25 on initial triage, HX of AFib noted now    The history is provided by the patient  Previous medical records were obtained from [facility]  Previous records are summarized from prior ER visits and hospitalizations    Past Medical History   -- Aortic aneurysm     -- Asthma, chronic     -- Chronic kidney disease     -- CKD (chronic kidney disease)     -- COPD (chronic obstructive pulmonary disease)     -- Coronary artery disease     -- Diabetes mellitus     -- Glaucoma (increased eye pressure)     -- Hypertension     -- Peripheral vascular disease     -- Prostate cancer     -- Sleep apnea     -- Small bowel obstruction     -- Status post balloon angioplasty of pulmonary artery branches     -- Stroke        Surgical history significant for back, chronic angioplasty, hernia, shoulder  Allergic to iodine and lisinopril  No significant family history  No significant social history, nonsmoker    I have reviewed all of this patient's past medical, surgical, family, and social   histories as well as active allergies and  medications documented in the  electronic medical record    Review of Systems and Physical Exam      Review of Systems (all other ROS are otherwise negative)  -- cannot give an accurate review of systems today    Vital Signs (reviewed by the physician)  His blood pressure is 159/68 and his pulse is 41   His respiration is 18 and oxygen saturation is 99%.     Physical Exam  -- Nursing note and vitals reviewed  -- Constitutional: Frail confused white male  -- Head: Atraumatic. Normocephalic. No obvious abnormality  -- Eyes: Pupils are equal and reactive to light. Normal conjunctiva and lids  -- Cardiac: Normal rate, regular rhythm and normal heart sounds  -- Respiratory: Normal respiratory effort, breath sounds clear to auscultation  -- Gastrointestinal: Soft, no tenderness. Normal bowel sounds. Normal liver edge  -- Musculoskeletal: Normal range of motion, no effusions. Joints stable   -- Neurological: No focal deficits. Showed good interaction with staff  -- Vascular: Posterior tibial, dorsalis pedis and radial pulses 2+ bilaterally      Emergency Room Course      Lab Results (reviewed by the physician)   (LL)   K 5.0   CL 86 (L)   CO2 24   BUN 28   CREATININE 1.6 (H)      ALKPHOS 95    (H)    (H)   BILITOT 0.4   ALBUMIN 3.3 (L)   PROT 7.2   WBC 5.91   HGB 11.4 (L)   HCT 32.5 (L)      CPK 84   CPK 84   CPKMB 2.2   TROPONINI 0.011    (H)   LACTATE 0.9     Urinalysis (reviewed by the physician)  -- Urinalysis performed during this ER visit showed signs of infection  -- The urine today has been sent for lab culture, results pending    Initial/1st EKG (interpreted by the physician)  Overall Interpretation:  Idioventricular rhythm at 40 beats per minute  Idioventricular rhythm has replaced previous atrial fibrillation  No ST elevation or depression  No arrhythmia or QRS change noted  Different EKG when compared to previous    Second EKG (interpreted by the physician)  Overall  Interpretation: Patient has a wide QRS complex at 42 beats per minute  Wide QRS complex as replace the previous idioventricular rhythm  No ST elevation or depression  No arrhythmia or QRS change noted  Patient does have an initial history of atrial fibrillation before today    Radiology (images visualized & reports reviewed by the physician)  -- The CT of the head performed was negative for acute pathology  -- Chest x-ray showed no infiltrate and showed no acute pathology     Additional Work up (reviewed by the physician)  -- the patient tested negative for influenza  -- rapid Coronavirus PCR was negative  -- Blood cultures have also been drawn, results are pending     Medications Given  0.9%  NaCl infusion ( Intravenous New Bag 2/9/22 1609)   cefTRIAXone (ROCEPHIN) 2 g/50 mL D5W IVPB (2 g Intravenous New Bag 2/9/22 1638)   0.9%  NaCl infusion ( Intravenous Stopped 2/9/22 1523)     Critical Care ED Physician Time (minutes):  -- Performed by: Robert Hancock M.D.  -- Date/Time: 4:46 PM 2/9/2022   -- Direct Patient Care (Face Time): 5  -- Additional History from Records or Taking Additional History: 5  -- Ordering, Reviewing, and Interpreting Diagnostic Studies: 5  -- Total Time in Documentation: 11  -- Consultation with Other Physicians: 5  -- Consultation with Family Related to Condition: 0  -- Total Critical Care Time: 31  -- Critical care was necessary to treat arrhythmia/altered mental status  -- Critical care was time spent personally by me on the following activities:   -- blood draw for specimens discussions with consultants,   -- development of treatment plan with patient or surrogate,   -- examination of patient, ordering and performing treatments   -- review of radiographic studies, re-evaluation of pt's condition  -- review of labs and evaluation of response to treatmen    Medical Decision Making     ED Course/ED Management  -- patient with altered mental status with a diagnosis of UTI 12 days ago  --  indwelling Paulino with Bactrim antibiotic finish today, continued confusion  -- patient also was admitted in October 2021 with hyponatremia/altered status  -- patient today has a sodium of 118, patient also has continue urinary tract infection  -- IV Rocephin given the ER with blood in urine cultures sent in the ER this afternoon  -- normal saline being given with close monitoring; no fever or signs of sepsis  -- patient however does have a significant change in heart rhythm and bradycardia  -- possibly has sick sinus syndrome, versus other arrhythmia based on evaluation  -- patient will possibly need a pacemaker, the very least cardiology evaluation ASAP   -- will transfer to higher level care, daughter at bedside agrees with decision tonight    Assessment, Disposition, & Plan      Diagnosis  [I49.9] Arrhythmia (Primary)  [N30.00] Acute cystitis without hematuria  [I44.2] Idioventricular rhythm  [E87.1] Hyponatremia    Disposition and Plan  -- Disposition: transfer  -- Condition: stable    This note is dictated on M*Modal word recognition program.  There are word recognition mistakes that are occasionally missed on review.         Robert Hancock MD  02/09/22 [id number]

## 2022-02-09 NOTE — ED NOTES
Pt cardiac monitor with irregularity. MD notified. Repeat EKG ordered - EKG @ bedside.   Pt awake and alert, reports he is cold.

## 2022-02-10 VITALS
DIASTOLIC BLOOD PRESSURE: 71 MMHG | BODY MASS INDEX: 25.45 KG/M2 | HEIGHT: 63 IN | TEMPERATURE: 97 F | RESPIRATION RATE: 20 BRPM | SYSTOLIC BLOOD PRESSURE: 139 MMHG | WEIGHT: 143.63 LBS | OXYGEN SATURATION: 99 % | HEART RATE: 50 BPM

## 2022-02-10 PROCEDURE — 25000003 PHARM REV CODE 250: Performed by: SURGERY

## 2022-02-10 RX ADMIN — SODIUM CHLORIDE: 0.9 INJECTION, SOLUTION INTRAVENOUS at 02:02

## 2022-02-10 NOTE — ED NOTES
[facility] here to transport pt to [facility] ED. No acute distress noted. Breaths even and unlabored. Fluids handed off to [facility]. Pt's packet given to [facility].

## 2022-02-10 NOTE — ED NOTES
[facility] leaving with patient on stretcher with fluids infusing at 125 ml/hour. No acute distress noted. Breaths even and unlabored.

## 2022-02-10 NOTE — ED NOTES
Spoke with Elizabeth STATON at [facility], she reports that Dr. Fuller at [facility] would like [facility] consulted about this patient to determine if they would be comfortable treating his case. Dr. Russell notified, given number to [facility] ([phone]).

## 2022-02-13 LAB — BACTERIA UR CULT: ABNORMAL

## 2022-02-15 LAB
BACTERIA BLD CULT: NORMAL
BACTERIA BLD CULT: NORMAL

## 2022-04-06 ENCOUNTER — LAB VISIT (OUTPATIENT)
Dept: LAB | Facility: HOSPITAL | Age: 87
End: 2022-04-06
Attending: INTERNAL MEDICINE
Payer: MEDICARE

## 2022-04-06 DIAGNOSIS — E11.59 HYPERTENSION ASSOCIATED WITH DIABETES: ICD-10-CM

## 2022-04-06 DIAGNOSIS — E78.2 MIXED HYPERLIPIDEMIA: ICD-10-CM

## 2022-04-06 DIAGNOSIS — I15.2 HYPERTENSION ASSOCIATED WITH DIABETES: ICD-10-CM

## 2022-04-06 DIAGNOSIS — E11.40 TYPE 2 DIABETES, CONTROLLED, WITH NEUROPATHY: ICD-10-CM

## 2022-04-06 DIAGNOSIS — E03.9 ACQUIRED HYPOTHYROIDISM: ICD-10-CM

## 2022-04-06 LAB
ALBUMIN SERPL BCP-MCNC: 3 G/DL (ref 3.5–5.2)
ALP SERPL-CCNC: 74 U/L (ref 55–135)
ALT SERPL W/O P-5'-P-CCNC: 21 U/L (ref 10–44)
ANION GAP SERPL CALC-SCNC: 8 MMOL/L (ref 8–16)
AST SERPL-CCNC: 22 U/L (ref 10–40)
BILIRUB SERPL-MCNC: 0.5 MG/DL (ref 0.1–1)
BUN SERPL-MCNC: 16 MG/DL (ref 10–30)
CALCIUM SERPL-MCNC: 8.9 MG/DL (ref 8.7–10.5)
CHLORIDE SERPL-SCNC: 104 MMOL/L (ref 95–110)
CHOLEST SERPL-MCNC: 237 MG/DL (ref 120–199)
CHOLEST/HDLC SERPL: 6.4 {RATIO} (ref 2–5)
CO2 SERPL-SCNC: 24 MMOL/L (ref 23–29)
CREAT SERPL-MCNC: 0.8 MG/DL (ref 0.5–1.4)
EST. GFR  (AFRICAN AMERICAN): >60 ML/MIN/1.73 M^2
EST. GFR  (NON AFRICAN AMERICAN): >60 ML/MIN/1.73 M^2
ESTIMATED AVG GLUCOSE: 108 MG/DL (ref 68–131)
GLUCOSE SERPL-MCNC: 107 MG/DL (ref 70–110)
HBA1C MFR BLD: 5.4 % (ref 4–5.6)
HDLC SERPL-MCNC: 37 MG/DL (ref 40–75)
HDLC SERPL: 15.6 % (ref 20–50)
LDLC SERPL CALC-MCNC: 179.2 MG/DL (ref 63–159)
NONHDLC SERPL-MCNC: 200 MG/DL
POTASSIUM SERPL-SCNC: 4.2 MMOL/L (ref 3.5–5.1)
PROT SERPL-MCNC: 7.2 G/DL (ref 6–8.4)
SODIUM SERPL-SCNC: 136 MMOL/L (ref 136–145)
T4 FREE SERPL-MCNC: 1.23 NG/DL (ref 0.71–1.51)
TRIGL SERPL-MCNC: 104 MG/DL (ref 30–150)
TSH SERPL DL<=0.005 MIU/L-ACNC: 4.15 UIU/ML (ref 0.4–4)

## 2022-04-06 PROCEDURE — 80061 LIPID PANEL: CPT | Performed by: INTERNAL MEDICINE

## 2022-04-06 PROCEDURE — 84443 ASSAY THYROID STIM HORMONE: CPT | Performed by: INTERNAL MEDICINE

## 2022-04-06 PROCEDURE — 80053 COMPREHEN METABOLIC PANEL: CPT | Performed by: INTERNAL MEDICINE

## 2022-04-06 PROCEDURE — 83036 HEMOGLOBIN GLYCOSYLATED A1C: CPT | Performed by: INTERNAL MEDICINE

## 2022-04-06 PROCEDURE — 36415 COLL VENOUS BLD VENIPUNCTURE: CPT | Performed by: INTERNAL MEDICINE

## 2022-04-06 PROCEDURE — 84439 ASSAY OF FREE THYROXINE: CPT | Performed by: INTERNAL MEDICINE

## 2022-04-07 ENCOUNTER — TELEPHONE (OUTPATIENT)
Dept: CARDIOLOGY | Facility: CLINIC | Age: 87
End: 2022-04-07
Payer: MEDICARE

## 2022-04-07 ENCOUNTER — PATIENT MESSAGE (OUTPATIENT)
Dept: CARDIOLOGY | Facility: CLINIC | Age: 87
End: 2022-04-07
Payer: MEDICARE

## 2022-04-07 NOTE — PROGRESS NOTES
Please review.  We will discuss the results during your upcoming visit with me. Lipids are not well controlled.

## 2022-04-07 NOTE — TELEPHONE ENCOUNTER
----- Message from Lynne Buckenr MD sent at 4/7/2022  9:46 AM CDT -----  Please review.  We will discuss the results during your upcoming visit with me. Lipids are not well controlled.

## 2022-04-11 ENCOUNTER — PATIENT MESSAGE (OUTPATIENT)
Dept: CARDIOLOGY | Facility: CLINIC | Age: 87
End: 2022-04-11
Payer: MEDICARE

## 2022-04-13 ENCOUNTER — OFFICE VISIT (OUTPATIENT)
Dept: CARDIOLOGY | Facility: CLINIC | Age: 87
End: 2022-04-13
Payer: MEDICARE

## 2022-04-13 VITALS
HEART RATE: 63 BPM | BODY MASS INDEX: 20.89 KG/M2 | WEIGHT: 130 LBS | DIASTOLIC BLOOD PRESSURE: 65 MMHG | SYSTOLIC BLOOD PRESSURE: 124 MMHG | HEIGHT: 66 IN

## 2022-04-13 DIAGNOSIS — E11.59 HYPERTENSION ASSOCIATED WITH DIABETES: Primary | ICD-10-CM

## 2022-04-13 DIAGNOSIS — I71.40 ABDOMINAL AORTIC ANEURYSM (AAA) WITHOUT RUPTURE: Chronic | ICD-10-CM

## 2022-04-13 DIAGNOSIS — I50.32 CHRONIC DIASTOLIC (CONGESTIVE) HEART FAILURE: ICD-10-CM

## 2022-04-13 DIAGNOSIS — I73.9 PAD (PERIPHERAL ARTERY DISEASE): ICD-10-CM

## 2022-04-13 DIAGNOSIS — I15.2 HYPERTENSION ASSOCIATED WITH DIABETES: Primary | ICD-10-CM

## 2022-04-13 DIAGNOSIS — I65.23 BILATERAL CAROTID ARTERY STENOSIS: Chronic | ICD-10-CM

## 2022-04-13 DIAGNOSIS — E03.9 ACQUIRED HYPOTHYROIDISM: ICD-10-CM

## 2022-04-13 DIAGNOSIS — I48.0 PAROXYSMAL ATRIAL FIBRILLATION: ICD-10-CM

## 2022-04-13 DIAGNOSIS — E11.40 TYPE 2 DIABETES, CONTROLLED, WITH NEUROPATHY: ICD-10-CM

## 2022-04-13 DIAGNOSIS — I35.1 NONRHEUMATIC AORTIC VALVE INSUFFICIENCY: ICD-10-CM

## 2022-04-13 DIAGNOSIS — E78.2 MIXED HYPERLIPIDEMIA: ICD-10-CM

## 2022-04-13 DIAGNOSIS — I63.9 CEREBROVASCULAR ACCIDENT (CVA), UNSPECIFIED MECHANISM: ICD-10-CM

## 2022-04-13 DIAGNOSIS — I49.5 SINUS BRADY-TACHY SYNDROME: ICD-10-CM

## 2022-04-13 DIAGNOSIS — N18.31 STAGE 3A CHRONIC KIDNEY DISEASE: ICD-10-CM

## 2022-04-13 DIAGNOSIS — G47.33 OSA ON CPAP: ICD-10-CM

## 2022-04-13 DIAGNOSIS — J44.9 CHRONIC OBSTRUCTIVE PULMONARY DISEASE, UNSPECIFIED COPD TYPE: Chronic | ICD-10-CM

## 2022-04-13 PROCEDURE — 3288F FALL RISK ASSESSMENT DOCD: CPT | Mod: CPTII,95,, | Performed by: INTERNAL MEDICINE

## 2022-04-13 PROCEDURE — 99214 PR OFFICE/OUTPT VISIT, EST, LEVL IV, 30-39 MIN: ICD-10-PCS | Mod: 95,,, | Performed by: INTERNAL MEDICINE

## 2022-04-13 PROCEDURE — 1159F PR MEDICATION LIST DOCUMENTED IN MEDICAL RECORD: ICD-10-PCS | Mod: CPTII,95,, | Performed by: INTERNAL MEDICINE

## 2022-04-13 PROCEDURE — 3288F PR FALLS RISK ASSESSMENT DOCUMENTED: ICD-10-PCS | Mod: CPTII,95,, | Performed by: INTERNAL MEDICINE

## 2022-04-13 PROCEDURE — 99214 OFFICE O/P EST MOD 30 MIN: CPT | Mod: 95,,, | Performed by: INTERNAL MEDICINE

## 2022-04-13 PROCEDURE — 1100F PR PT FALLS ASSESS DOC 2+ FALLS/FALL W/INJURY/YR: ICD-10-PCS | Mod: CPTII,95,, | Performed by: INTERNAL MEDICINE

## 2022-04-13 PROCEDURE — 1125F PR PAIN SEVERITY QUANTIFIED, PAIN PRESENT: ICD-10-PCS | Mod: CPTII,95,, | Performed by: INTERNAL MEDICINE

## 2022-04-13 PROCEDURE — 1160F PR REVIEW ALL MEDS BY PRESCRIBER/CLIN PHARMACIST DOCUMENTED: ICD-10-PCS | Mod: CPTII,95,, | Performed by: INTERNAL MEDICINE

## 2022-04-13 PROCEDURE — 1100F PTFALLS ASSESS-DOCD GE2>/YR: CPT | Mod: CPTII,95,, | Performed by: INTERNAL MEDICINE

## 2022-04-13 PROCEDURE — 1125F AMNT PAIN NOTED PAIN PRSNT: CPT | Mod: CPTII,95,, | Performed by: INTERNAL MEDICINE

## 2022-04-13 PROCEDURE — 1160F RVW MEDS BY RX/DR IN RCRD: CPT | Mod: CPTII,95,, | Performed by: INTERNAL MEDICINE

## 2022-04-13 PROCEDURE — 1159F MED LIST DOCD IN RCRD: CPT | Mod: CPTII,95,, | Performed by: INTERNAL MEDICINE

## 2022-04-13 RX ORDER — FOLIC ACID 0.4 MG
400 TABLET ORAL DAILY
COMMUNITY

## 2022-04-13 RX ORDER — SIMETHICONE 125 MG
125 TABLET,CHEWABLE ORAL EVERY 6 HOURS PRN
COMMUNITY

## 2022-04-13 RX ORDER — LEVOTHYROXINE SODIUM 25 UG/1
25 TABLET ORAL DAILY
COMMUNITY

## 2022-04-13 NOTE — PROGRESS NOTES
Subjective:   Patient ID:  Aleksandr Schultz is a 89 y.o. male who presents for follow-up of Hypertension      HPI: The patient location is: HOME, [place]    The chief complaint leading to consultation is: hypertensive heart disease, SSS.    Visit type: audiovisual    Face to Face time with patient: 30min.    45 minutes of total time spent on the encounter, which includes face to face time and non-face to face time preparing to see the patient (eg, review of tests), Obtaining and/or reviewing separately obtained history, Documenting clinical information in the electronic or other health record, Independently interpreting results (not separately reported) and communicating results to the patient/family/caregiver, or Care coordination (not separately reported).     Each patient to whom he or she provides medical services by telemedicine is:  (1) informed of the relationship between the physician and patient and the respective role of any other health care provider with respect to management of the patient; and (2) notified that he or she may decline to receive medical services by telemedicine and may withdraw from such care at any time.    Notes: Patient was recently hospitalized with UTI, dehydration, and hypotension.  Statin, Metoprolol, Hydralazine, Furosemide, and potassium supplements were discontinued. Amiodarone was lowered to 100 mg daily due to bradycardia.    Since the discharge patient is doing well. Home vital signs indicate normal BP and pulse 50-60 bpm off betablocker and lower dose of Amiodarone.  Patient restarted statin and continued on Eliquis.    Lab Results   Component Value Date     04/06/2022    K 4.2 04/06/2022     04/06/2022    CO2 24 04/06/2022    BUN 16 04/06/2022    CREATININE 0.8 04/06/2022     04/06/2022    HGBA1C 5.4 04/06/2022    MG 1.9 02/09/2022    AST 22 04/06/2022    ALT 21 04/06/2022    ALBUMIN 3.0 (L) 04/06/2022    PROT 7.2 04/06/2022    BILITOT 0.5 04/06/2022    WBC  5.91 02/09/2022    HGB 11.4 (L) 02/09/2022    HCT 32.5 (L) 02/09/2022    MCV 93 02/09/2022     02/09/2022    INR 1.0 05/02/2017    PSA 0.65 12/04/2012    TSH 4.153 (H) 04/06/2022    CHOL 237 (H) 04/06/2022    HDL 37 (L) 04/06/2022    LDLCALC 179.2 (H) 04/06/2022    TRIG 104 04/06/2022       No results found in the last 24 hours.         Objective:   Physical Exam  Nursing note reviewed. Exam conducted with a chaperone present.     /65, pulse 63bpm      Assessment and Plan:     Problem List Items Addressed This Visit        Cardiology Problems    AAA (abdominal aortic aneurysm) (Chronic)    Bilateral carotid artery disease (Chronic)    PAD (peripheral artery disease)    Hyperlipidemia    Paroxysmal atrial fibrillation    Sinus noemy-tachy syndrome    Nonrheumatic aortic valve insufficiency, mild to moderate    Cerebrovascular accident (CVA)    Hypertension associated with diabetes - Primary    Chronic diastolic (congestive) heart failure       Other    COPD (chronic obstructive pulmonary disease) (Chronic)    Type 2 diabetes, controlled, with neuropathy    MICHELLE on CPAP    CKD (chronic kidney disease) stage 3, GFR 30-59 ml/min    Acquired hypothyroidism          Patient's Medications   New Prescriptions    No medications on file   Previous Medications    ACETAMINOPHEN (TYLENOL) 325 MG TABLET    Take 2 tablets (650 mg total) by mouth every 4 (four) hours as needed.    ALBUTEROL (PROVENTIL) 2.5 MG /3 ML (0.083 %) NEBULIZER SOLUTION    Take 2.5 mg by nebulization 4 (four) times daily as needed.     AMIODARONE (PACERONE) 200 MG TAB    TAKE 1 TABLET ONE TIME DAILY. TAKE AT NIGHT PER DR. LIPSCOMB ON 6/23/17.    AMLODIPINE (NORVASC) 10 MG TABLET    TAKE 1 TABLET EVERY DAY    ATORVASTATIN (LIPITOR) 40 MG TABLET    Take 40 mg by mouth once daily. Pt taking 1/2 pill daily.    CYANOCOBALAMIN (VITAMIN B-12) 1000 MCG TABLET    Take 100 mcg by mouth once daily.    ELIQUIS 2.5 MG TAB    TAKE 1 TABLET TWICE DAILY     FOLIC ACID (FOLVITE) 400 MCG TABLET    Take 400 mcg by mouth once daily.    GLIMEPIRIDE (AMARYL) 4 MG TABLET    Take 4 mg by mouth 2 (two) times daily. Pt only taking if his bs is 200 or higher.    IPRATROPIUM (ATROVENT) 0.02 % NEBULIZER SOLUTION    Take 500 mcg by nebulization 4 (four) times daily. PRN    LEVOTHYROXINE (SYNTHROID) 25 MCG TABLET    Take 25 mcg by mouth once daily.    MOMETASONE (NASONEX) 50 MCG/ACTUATION NASAL SPRAY    2 sprays by Nasal route daily as needed.    PIOGLITAZONE (ACTOS) 15 MG TABLET    Take 15 mg by mouth once daily.    SIMETHICONE (MYLICON) 125 MG CHEWABLE TABLET    Take 125 mg by mouth every 6 (six) hours as needed for Flatulence.    TAMSULOSIN (FLOMAX) 0.4 MG CAP    Take 0.8 mg by mouth every evening.     TOLTERODINE (DETROL LA) 2 MG CP24    Take 50 mg by mouth 2 (two) times a day.    VITAMIN D 1000 UNITS TAB    Take 1,000 Units by mouth once daily.   Modified Medications    No medications on file   Discontinued Medications    ASPIRIN (ECOTRIN) 81 MG EC TABLET    Take 81 mg by mouth once daily.      CYCLOBENZAPRINE (FLEXERIL) 10 MG TABLET    Take 10 mg by mouth 3 (three) times daily as needed. Pt taking one pill at night.    FLUZONE HIGHDOSE QUAD 20-21  MCG/0.7 ML SYRG        FUROSEMIDE (LASIX) 40 MG TABLET    Take 1 tablet (40 mg total) by mouth 2 (two) times daily.    HYDRALAZINE (APRESOLINE) 25 MG TABLET    TAKE 1 TABLET EVERY 8 HOURS    LORATADINE 10 MG CAP    Take 1 tablet by mouth once daily.     METOPROLOL TARTRATE (LOPRESSOR) 50 MG TABLET    Take 50 mg by mouth 2 (two) times daily.    NITROGLYCERIN (NITROSTAT) 0.4 MG SL TABLET    Place 0.4 mg under the tongue every 5 (five) minutes as needed (PRN).     POTASSIUM CHLORIDE (MICRO-K) 10 MEQ CPSR    TAKE 1 CAPSULE EVERY DAY    UNABLE TO FIND    Take by mouth once daily. LBS veggie fiber tablet     For now continue to hold Lasix and Potassium, Hydralazine and Metoprolol.  We have discussed SSS. Patient and his daughter are  not interested in evaluation for PPM.  If they ever consider this route then DNR order would have to be reversed.  Daughter understood the reasoning behind it.  They will continue to monitor his vital sings and call if out of range.      No follow-ups on file.

## 2022-11-20 NOTE — TELEPHONE ENCOUNTER
----- Message from Lani Vega MA sent at 1/11/2017 10:35 AM CST -----  I scheduled lab at [facility] on 2-3-17 but the order needs to be extended and linked.  Please extend and i can link.  Thank you.  
Order linked  
Communicable/Infectious

## 2025-01-04 NOTE — TELEPHONE ENCOUNTER
José Miguel notified, verbalized understanding. Clearance faxed to Dr. Kiser [phone]. Dr. Buckner's instructions sent to pt through [employer].    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 Therefore he should anticipate hematuria. in the absence of fever, chills abdominal pain etc. No intervention is indicated.     Patient's son was advised and verbalized understanding.